# Patient Record
Sex: MALE | Race: WHITE | NOT HISPANIC OR LATINO | Employment: FULL TIME | URBAN - NONMETROPOLITAN AREA
[De-identification: names, ages, dates, MRNs, and addresses within clinical notes are randomized per-mention and may not be internally consistent; named-entity substitution may affect disease eponyms.]

---

## 2017-11-01 ENCOUNTER — COMMUNICATION - GICH (OUTPATIENT)
Dept: FAMILY MEDICINE | Facility: OTHER | Age: 57
End: 2017-11-01

## 2017-11-01 DIAGNOSIS — F34.1 DYSTHYMIC DISORDER: ICD-10-CM

## 2017-12-28 NOTE — TELEPHONE ENCOUNTER
Patient Information     Patient Name MRN Sex Adin Iniguez [id number] Male 1960      Telephone Encounter by Konrad Berkowitz RN at 11/3/2017  1:34 PM     Author:  Konrad Berkowitz RN Service:  (none) Author Type:  NURS- Registered Nurse     Filed:  11/3/2017  1:41 PM Encounter Date:  2017 Status:  Signed     :  Konard Berkowitz RN (NURS- Registered Nurse)            Depression-in adults 18 and over  SSRI    Office visit in the past 12 months or as indicated in chart.  Should have clinic visit 1-2 months after initial prescription.    Last visit with CANDY ZAVALA was on: 2016 in [facility]  Next visit with CANDY ZAVALA is on: No future appointment listed with this provider  Next visit with Family Practice is on: No future appointment listed in this department    Max refills 12 months from last office visit or per providers notes.    PHQ Depression Screening 2016   Date of PHQ exam (doc flow) 2016 (No Data)   1. Lack of interest/pleasure 1 - Several days -   2. Feeling down/depressed 0 - Not at all -   PHQ-2 TOTAL SCORE 1 -   3. Trouble sleeping 1 - Several days -   4. Decreased energy 1 - Several days -   5. Appetite change 0 - Not at all -   6. Feelings of failure 0 - Not at all -   7. Trouble concentrating 0 - Not at all -   8. Activity level 0 - Not at all -   9. Hurting yourself 0 - Not at all -   PHQ-9 TOTAL SCORE 3 -   PHQ-9 Severity Level none -   Functional Impairment not difficult at all -   Some recent data might be hidden       Chart review shows that patient was last seen by PCP for medication management, as well as use of paxil on 16. Dose of paxil was adjusted at that time per office visit notes on that date to 80 mg daily. No follow up noted. Patient is overdue for a follow up with PCP. Will refill rx as requested for a limited supply at this time, and will send patient a reminder letter/MyChart message as no appointment is noted in patient's  chart at this time.    Prescription refilled per RN Medication Refill Policy.................... Konrad Berkowitz RN ....................  11/3/2017   1:38 PM

## 2018-01-30 ENCOUNTER — AMBULATORY - GICH (OUTPATIENT)
Dept: FAMILY MEDICINE | Facility: OTHER | Age: 58
End: 2018-01-30

## 2018-01-30 ENCOUNTER — COMMUNICATION - GICH (OUTPATIENT)
Dept: FAMILY MEDICINE | Facility: OTHER | Age: 58
End: 2018-01-30

## 2018-01-31 ENCOUNTER — AMBULATORY - GICH (OUTPATIENT)
Dept: FAMILY MEDICINE | Facility: OTHER | Age: 58
End: 2018-01-31

## 2018-02-01 ENCOUNTER — OFFICE VISIT - GICH (OUTPATIENT)
Dept: FAMILY MEDICINE | Facility: OTHER | Age: 58
End: 2018-02-01

## 2018-02-01 ENCOUNTER — HISTORY (OUTPATIENT)
Dept: FAMILY MEDICINE | Facility: OTHER | Age: 58
End: 2018-02-01

## 2018-02-01 DIAGNOSIS — R68.89 OTHER GENERAL SYMPTOMS AND SIGNS: ICD-10-CM

## 2018-02-01 DIAGNOSIS — Z13.0 ENCOUNTER FOR SCREENING FOR DISEASES OF THE BLOOD AND BLOOD-FORMING ORGANS AND CERTAIN DISORDERS INVOLVING THE IMMUNE MECHANISM: ICD-10-CM

## 2018-02-01 DIAGNOSIS — Z13.220 ENCOUNTER FOR SCREENING FOR LIPOID DISORDERS: ICD-10-CM

## 2018-02-01 DIAGNOSIS — F41.9 ANXIETY DISORDER: ICD-10-CM

## 2018-02-01 DIAGNOSIS — Z12.5 ENCOUNTER FOR SCREENING FOR MALIGNANT NEOPLASM OF PROSTATE: ICD-10-CM

## 2018-02-01 DIAGNOSIS — M54.2 CERVICALGIA: ICD-10-CM

## 2018-02-01 DIAGNOSIS — Z13.228 ENCOUNTER FOR SCREENING FOR OTHER METABOLIC DISORDERS: ICD-10-CM

## 2018-02-01 DIAGNOSIS — M54.50 LOW BACK PAIN: ICD-10-CM

## 2018-02-01 DIAGNOSIS — G89.29 OTHER CHRONIC PAIN: ICD-10-CM

## 2018-02-01 DIAGNOSIS — Z51.81 ENCOUNTER FOR THERAPEUTIC DRUG LEVEL MONITORING: ICD-10-CM

## 2018-02-01 DIAGNOSIS — Z79.1 LONG TERM CURRENT USE OF NON-STEROIDAL ANTI-INFLAMMATORIES (NSAID): ICD-10-CM

## 2018-02-01 DIAGNOSIS — Z00.00 ENCOUNTER FOR GENERAL ADULT MEDICAL EXAMINATION WITHOUT ABNORMAL FINDINGS: ICD-10-CM

## 2018-02-01 LAB
A/G RATIO - HISTORICAL: 2 (ref 1–2)
ABSOLUTE BASOPHILS - HISTORICAL: 0.1 THOU/CU MM
ABSOLUTE EOSINOPHILS - HISTORICAL: 0.2 THOU/CU MM
ABSOLUTE IMMATURE GRANULOCYTES(METAS,MYELOS,PROS) - HISTORICAL: 0 THOU/CU MM
ABSOLUTE LYMPHOCYTES - HISTORICAL: 2.3 THOU/CU MM (ref 0.9–2.9)
ABSOLUTE MONOCYTES - HISTORICAL: 0.8 THOU/CU MM
ABSOLUTE NEUTROPHILS - HISTORICAL: 3.9 THOU/CU MM (ref 1.7–7)
ALBUMIN SERPL-MCNC: 4.6 G/DL (ref 3.5–5.7)
ALP SERPL-CCNC: 64 IU/L (ref 34–104)
ALT (SGPT) - HISTORICAL: 25 IU/L (ref 7–52)
ANION GAP - HISTORICAL: 7 (ref 5–18)
AST SERPL-CCNC: 23 IU/L (ref 13–39)
BASOPHILS # BLD AUTO: 1.1 %
BILIRUB SERPL-MCNC: 0.3 MG/DL (ref 0.3–1)
BUN SERPL-MCNC: 17 MG/DL (ref 7–25)
BUN/CREAT RATIO - HISTORICAL: 20
CALCIUM SERPL-MCNC: 9.5 MG/DL (ref 8.6–10.3)
CHLORIDE SERPLBLD-SCNC: 105 MMOL/L (ref 98–107)
CHOL/HDL RATIO - HISTORICAL: 3.81
CHOLESTEROL TOTAL: 164 MG/DL
CO2 SERPL-SCNC: 27 MMOL/L (ref 21–31)
CREAT SERPL-MCNC: 0.83 MG/DL (ref 0.7–1.3)
EOSINOPHIL NFR BLD AUTO: 2.9 %
ERYTHROCYTE [DISTWIDTH] IN BLOOD BY AUTOMATED COUNT: 12.7 % (ref 11.5–15.5)
GFR IF NOT AFRICAN AMERICAN - HISTORICAL: >60 ML/MIN/1.73M2
GLOBULIN - HISTORICAL: 2.3 G/DL (ref 2–3.7)
GLUCOSE SERPL-MCNC: 84 MG/DL (ref 70–105)
HCT VFR BLD AUTO: 45 % (ref 37–53)
HDLC SERPL-MCNC: 43 MG/DL (ref 23–92)
HEMOGLOBIN: 16 G/DL (ref 13.5–17.5)
IMMATURE GRANULOCYTES(METAS,MYELOS,PROS) - HISTORICAL: 0.5 %
LDLC SERPL CALC-MCNC: 84 MG/DL
LYMPHOCYTES NFR BLD AUTO: 31.6 % (ref 20–44)
MCH RBC QN AUTO: 30.1 PG (ref 26–34)
MCHC RBC AUTO-ENTMCNC: 35.6 G/DL (ref 32–36)
MCV RBC AUTO: 85 FL (ref 80–100)
MONOCYTES NFR BLD AUTO: 11.1 %
NEUTROPHILS NFR BLD AUTO: 52.8 % (ref 42–72)
NON-HDL CHOLESTEROL - HISTORICAL: 121 MG/DL
PLATELET # BLD AUTO: 300 THOU/CU MM (ref 140–440)
PMV BLD: 9.5 FL (ref 6.5–11)
POTASSIUM SERPL-SCNC: 4 MMOL/L (ref 3.5–5.1)
PROT SERPL-MCNC: 6.9 G/DL (ref 6.4–8.9)
PROVIDER ORDERDED STATUS - HISTORICAL: ABNORMAL
PSA TOTAL (DIAGNOSTIC) - HISTORICAL: 0.61 NG/ML
RED BLOOD COUNT - HISTORICAL: 5.31 MIL/CU MM (ref 4.3–5.9)
SODIUM SERPL-SCNC: 139 MMOL/L (ref 133–143)
T4 FREE SERPL-MCNC: 0.81 NG/DL (ref 0.58–1.64)
TRIGL SERPL-MCNC: 186 MG/DL
TSH - HISTORICAL: 2.53 UIU/ML (ref 0.34–5.6)
WHITE BLOOD COUNT - HISTORICAL: 7.3 THOU/CU MM (ref 4.5–11)

## 2018-02-01 ASSESSMENT — ANXIETY QUESTIONNAIRES
3. WORRYING TOO MUCH ABOUT DIFFERENT THINGS: MORE THAN HALF THE DAYS
7. FEELING AFRAID AS IF SOMETHING AWFUL MIGHT HAPPEN: NEARLY EVERY DAY
5. BEING SO RESTLESS THAT IT IS HARD TO SIT STILL: NEARLY EVERY DAY
2. NOT BEING ABLE TO STOP OR CONTROL WORRYING: SEVERAL DAYS
GAD7 TOTAL SCORE: 17
6. BECOMING EASILY ANNOYED OR IRRITABLE: MORE THAN HALF THE DAYS
4. TROUBLE RELAXING: NEARLY EVERY DAY
1. FEELING NERVOUS, ANXIOUS, OR ON EDGE: NEARLY EVERY DAY

## 2018-02-01 ASSESSMENT — PATIENT HEALTH QUESTIONNAIRE - PHQ9: SUM OF ALL RESPONSES TO PHQ QUESTIONS 1-9: 6

## 2018-02-09 VITALS
DIASTOLIC BLOOD PRESSURE: 88 MMHG | BODY MASS INDEX: 28.7 KG/M2 | HEIGHT: 71 IN | WEIGHT: 205 LBS | SYSTOLIC BLOOD PRESSURE: 136 MMHG | HEART RATE: 60 BPM

## 2018-02-10 ASSESSMENT — PATIENT HEALTH QUESTIONNAIRE - PHQ9: SUM OF ALL RESPONSES TO PHQ QUESTIONS 1-9: 6

## 2018-02-10 ASSESSMENT — ANXIETY QUESTIONNAIRES: GAD7 TOTAL SCORE: 17

## 2018-02-13 NOTE — PROGRESS NOTES
Patient Information     Patient Name MRN Sex Adin Iniguez [id number] Male 1960      Progress Notes by Juan Rojas MD at 2018 10:30 AM     Author:  Juan Rojas MD Service:  (none) Author Type:  Physician     Filed:  2018  6:35 PM Encounter Date:  2018 Status:  Signed     :  Juan Rojas MD (Physician)            Nursing Notes:   Chaya Pimentel  2018 10:53 AM  Signed  He is here today to have a physical.  Chaya MANNING Loren LPN..................2018   10:51 AM        SUBJECTIVE:  Adin Madrid  is a 57 y.o. male who comes in today for complete evaluation. I last saw him about a year and a half ago. He had a negative CT cardiac calcium score and normal lipids.  He is up-to-date on health maintenance issues.    He has issues of ongoing upper back and neck pain. He gets some relief from heat, NSAIDs, and chiropractic treatments with Dr. Ochoa.    He struggles at times with tolerating heat. He uses a warm pack for his neck daily.     He's had plantar fasciitis that has gotten better with insoles. He had some concerns about family history of heart disease.    He has questions about shingles. He would be a candidate for Zostavax at age 62.    He's been on Paxil for many years for depression and anxiety. His depression symptoms are pretty well abated but he has chronic anxiety and the paroxetine seems to help that. He is on 40 mg daily.    He is building a barn this next summer.     PHQ Depression Screening 2016   Date of PHQ exam (doc flow) (No Data) 2018   1. Lack of interest/pleasure - 1 - Several days   2. Feeling down/depressed - 0 - Not at all   PHQ-2 TOTAL SCORE - 1   3. Trouble sleeping - 2 - More than half the days   4. Decreased energy - 2 - More than half the days   5. Appetite change - 0 - Not at all   6. Feelings of failure - 1 - Several days   7. Trouble concentrating - 0 - Not at all   8. Activity level - 0 - Not at all   9. Hurting yourself - 0  - Not at all   PHQ-9 TOTAL SCORE - 6   PHQ-9 Severity Level - mild   Functional Impairment - not difficult at all   Some recent data might be hidden        MODESTA-7 ANXIETY SCREENING 2/1/2018   MODESTA date (doc flow) 2/1/2018   Nervous, anxious 3   Cannot stop worrying 1   Worry about different things 2   Cannot relax 3   Feeling restless 3   Easily annoyed/irritated 2   Afraid of awful event 3   Score 17   Severity severe anxiety   Some recent data might be hidden         Past Medical, Family, and Social History reviewed and updated as noted below.   ROS is negative except as noted above       No Known Allergies,   Family History       Problem   Relation Age of Onset     Diabetes  Mother      Other  Mother      early dementia       Hyperlipidemia  Father      High cholesterol        Stroke  Father    ,   Current Outpatient Prescriptions on File Prior to Visit       Medication  Sig Dispense Refill     desoximetasone (TOPICORT) 0.05 % cream Apply  topically to affected area(s) 2 times daily. 60 g 3     naproxen (ANAPROX DS) 550 mg tablet Take 1 tablet by mouth every 8 hours if needed. 270 tablet 3     No current facility-administered medications on file prior to visit.    ,   Past Medical History:     Diagnosis  Date     Gastroesophageal reflux disease     resolved      General medical examination 9/20/04    Satisfactory      Metatarsalgia     resolved.      Strain of thoracic region    ,   Patient Active Problem List       Diagnosis  Date Noted     Anxiety  02/01/2018     Chronic neck pain  05/18/2015     Chronic low back pain  05/18/2015     DERMATOFIBROMA       Right shoulder        ,   Past Surgical History:      Procedure  Laterality Date     COLONOSCOPY SCREENING  1/2014    Melanosis coli - normal; follow up 10 years       VASECTOMY  01/06    and   Social History     Substance Use Topics       Smoking status: Never Smoker     Smokeless tobacco: Never Used     Alcohol use No     OBJECTIVE:  /88 (Cuff Site: Right  "Arm, Position: Sitting, Cuff Size: Adult Large)  Pulse 60  Ht 1.791 m (5' 10.5\")  Wt 93 kg (205 lb)  BMI 29 kg/m2   EXAM:  General Appearance: Pleasant, alert, appropriate appearance for age. No acute distress  Head Exam: Normal. Normocephalic, atraumatic.  Eye Exam:  Normal external eye, conjunctiva, lids, cornea. GERALDINE. EOMI  Ear Exam: Normal TM's bilaterally. Normal auditory canals and external ears. Non-tender.  Nose Exam: Normal external nose, mucus membranes, and septum.  OroPharynx Exam:  Dental hygiene adequate. Normal buccal mucosa. Normal pharynx.  Neck Exam:  Supple, no masses or nodes. No audible bruits  Thyroid Exam: No nodules or enlargement.  Chest/Respiratory Exam: Normal chest wall and respirations. Clear to auscultation.  Cardiovascular Exam: Regular rate and rhythm. S1, S2, no murmur, click, gallop, or rubs.  Gastrointestinal Exam: Soft, non-tender, no masses or organomegaly.  Lymphatic Exam: Non-palpable nodes in neck, clavicular regions.  Musculoskeletal Exam: Back is straight and non-tender, full ROM of upper and lower extremities.  Foot Exam: Left and right foot: good pedal pulses.  Skin: no rash or abnormalities  Neurologic Exam: Nonfocal,normal gross motor, tone coordination and no tremor.  Psychiatric Exam: Alert and oriented - appropriate affect.     Results for orders placed or performed in visit on 02/01/18      COMP METABOLIC PANEL      Result  Value Ref Range    SODIUM 139 133 - 143 mmol/L    POTASSIUM 4.0 3.5 - 5.1 mmol/L    CHLORIDE 105 98 - 107 mmol/L    CO2,TOTAL 27 21 - 31 mmol/L    ANION GAP 7 5 - 18                    GLUCOSE 84 70 - 105 mg/dL    CALCIUM 9.5 8.6 - 10.3 mg/dL    BUN 17 7 - 25 mg/dL    CREATININE 0.83 0.70 - 1.30 mg/dL    BUN/CREAT RATIO           20                    GFR if African American >60 >60 ml/min/1.73m2    GFR if not African American >60 >60 ml/min/1.73m2    ALBUMIN 4.6 3.5 - 5.7 g/dL    PROTEIN,TOTAL 6.9 6.4 - 8.9 g/dL    GLOBULIN                  " 2.3 2.0 - 3.7 g/dL    A/G RATIO 2.0 1.0 - 2.0                    BILIRUBIN,TOTAL 0.3 0.3 - 1.0 mg/dL    ALK PHOSPHATASE 64 34 - 104 IU/L    ALT (SGPT) 25 7 - 52 IU/L    AST (SGOT) 23 13 - 39 IU/L   PSA TOTAL (DIAGNOSTIC)      Result  Value Ref Range    PSA TOTAL (DIAGNOSTIC) 0.611 <=3.100 ng/mL   LIPID PANEL      Result  Value Ref Range    CHOLESTEROL,TOTAL 164 <200 mg/dL    TRIGLYCERIDES 186 (H) <150 mg/dL    HDL CHOLESTEROL 43 23 - 92 mg/dL    NON-HDL CHOLESTEROL 121 <145 mg/dl    CHOL/HDL RATIO            3.81 <4.50                    LDL CHOLESTEROL 84 <100 mg/dL    PROVIDER ORDERED STATUS RANDOM    TSH      Result  Value Ref Range    TSH 2.53 0.34 - 5.60 uIU/mL   T4,FREE      Result  Value Ref Range    T4,FREE 0.81 0.58 - 1.64 ng/dL   CBC WITH AUTO DIFFERENTIAL      Result  Value Ref Range    WHITE BLOOD COUNT         7.3 4.5 - 11.0 thou/cu mm    RED BLOOD COUNT           5.31 4.30 - 5.90 mil/cu mm    HEMOGLOBIN                16.0 13.5 - 17.5 g/dL    HEMATOCRIT                45.0 37.0 - 53.0 %    MCV                       85 80 - 100 fL    MCH                       30.1 26.0 - 34.0 pg    MCHC                      35.6 32.0 - 36.0 g/dL    RDW                       12.7 11.5 - 15.5 %    PLATELET COUNT            300 140 - 440 thou/cu mm    MPV                       9.5 6.5 - 11.0 fL    NEUTROPHILS               52.8 42.0 - 72.0 %    LYMPHOCYTES               31.6 20.0 - 44.0 %    MONOCYTES                 11.1 <12.0 %    EOSINOPHILS               2.9 <8.0 %    BASOPHILS                 1.1 <3.0 %    IMMATURE GRANULOCYTES(METAS,MYELOS,PROS) 0.5 %    ABSOLUTE NEUTROPHILS      3.9 1.7 - 7.0 thou/cu mm    ABSOLUTE LYMPHOCYTES      2.3 0.9 - 2.9 thou/cu mm    ABSOLUTE MONOCYTES        0.8 <0.9 thou/cu mm    ABSOLUTE EOSINOPHILS      0.2 <0.5 thou/cu mm    ABSOLUTE BASOPHILS        0.1 <0.3 thou/cu mm    ABSOLUTE IMMATURE GRANULOCYTES(METAS,MYELOS,PROS) 0.0 <=0.3 thou/cu mm      ASSESSMENT/Plan :      Adin was seen  today for physical.    Diagnoses and all orders for this visit:    Visit for preventive health examination    Chronic neck pain  -     CBC AND DIFFERENTIAL; Future  -     COMP METABOLIC PANEL; Future    Chronic low back pain, unspecified back pain laterality, with sciatica presence unspecified  -     CBC AND DIFFERENTIAL; Future  -     COMP METABOLIC PANEL; Future    Screening for deficiency anemia  -     CBC AND DIFFERENTIAL; Future    Screening for metabolic disorder  -     COMP METABOLIC PANEL; Future    Screening for hyperlipidemia  -     LIPID PANEL; Future    Screening for prostate cancer  -     PSA TOTAL (DIAGNOSTIC); Future    Heat intolerance  -     TSH; Future  -     T4,FREE; Future    Encounter for monitoring chronic NSAID therapy  -     CBC AND DIFFERENTIAL; Future  -     COMP METABOLIC PANEL; Future    Chronic low back pain without sciatica, unspecified back pain laterality    Anxiety  -     PARoxetine (PAXIL) 40 mg tablet; Take 2 tablets by mouth once daily.    Will notify of lab results when available. Discussed diet, exercise and healthy lifestyle changes. Continue current medications. Continue current treatments for his neck and back pain. Placed on etodolac because Anaprox is no longer covered on his insurance.    Juan Rojas MD

## 2018-02-13 NOTE — NURSING NOTE
Patient Information     Patient Name MRN Sex Adin Iniguez [id number] Male 1960      Nursing Note by Chaya Pimentel at 2018 10:30 AM     Author:  Chaya Pimentel Service:  (none) Author Type:  (none)     Filed:  2018 10:53 AM Encounter Date:  2018 Status:  Signed     :  Chaya Pimentel            He is here today to have a physical.  Chaya Pimentel LPN..................2018   10:51 AM

## 2018-02-13 NOTE — TELEPHONE ENCOUNTER
Patient Information     Patient Name MRN Adin Fajardo [id number] Male 1960      Telephone Encounter by Chaya Pimentel at 2018 11:50 AM     Author:  Chaya Pimentel Service:  (none) Author Type:  (none)     Filed:  2018 11:51 AM Encounter Date:  2018 Status:  Signed     :  Chaya Pimentel            The patient is sending a medical message for Dr Rojas to read today before his appointment on friday. I told the patient he would review this before his appointment if he sends it today.  Chaya Pimentel LPN..................2018   11:51 AM

## 2018-04-18 ENCOUNTER — HOSPITAL ENCOUNTER (OUTPATIENT)
Dept: GENERAL RADIOLOGY | Facility: OTHER | Age: 58
Discharge: HOME | End: 2018-04-18
Attending: FAMILY MEDICINE | Admitting: FAMILY MEDICINE
Payer: COMMERCIAL

## 2018-04-18 ENCOUNTER — OFFICE VISIT (OUTPATIENT)
Dept: FAMILY MEDICINE | Facility: OTHER | Age: 58
End: 2018-04-18
Attending: FAMILY MEDICINE
Payer: COMMERCIAL

## 2018-04-18 ENCOUNTER — OFFICE VISIT (OUTPATIENT)
Dept: ORTHOPEDICS | Facility: OTHER | Age: 58
End: 2018-04-18
Attending: FAMILY MEDICINE
Payer: COMMERCIAL

## 2018-04-18 VITALS
HEART RATE: 64 BPM | BODY MASS INDEX: 29.31 KG/M2 | WEIGHT: 207.2 LBS | SYSTOLIC BLOOD PRESSURE: 124 MMHG | DIASTOLIC BLOOD PRESSURE: 80 MMHG

## 2018-04-18 VITALS
HEIGHT: 71 IN | HEART RATE: 64 BPM | SYSTOLIC BLOOD PRESSURE: 124 MMHG | DIASTOLIC BLOOD PRESSURE: 80 MMHG | WEIGHT: 207 LBS | BODY MASS INDEX: 28.98 KG/M2

## 2018-04-18 DIAGNOSIS — S69.90XA JAMMED FINGER (INTERPHALANGEAL JOINT), INITIAL ENCOUNTER: Primary | ICD-10-CM

## 2018-04-18 DIAGNOSIS — S69.90XA JAMMED FINGER (INTERPHALANGEAL JOINT), INITIAL ENCOUNTER: ICD-10-CM

## 2018-04-18 PROCEDURE — 99213 OFFICE O/P EST LOW 20 MIN: CPT | Performed by: FAMILY MEDICINE

## 2018-04-18 PROCEDURE — 99203 OFFICE O/P NEW LOW 30 MIN: CPT | Performed by: ORTHOPAEDIC SURGERY

## 2018-04-18 PROCEDURE — 73140 X-RAY EXAM OF FINGER(S): CPT | Mod: LT

## 2018-04-18 RX ORDER — ETODOLAC 500 MG
500 TABLET ORAL
COMMUNITY
Start: 2018-02-01 | End: 2019-04-11

## 2018-04-18 RX ORDER — PAROXETINE 40 MG/1
80 TABLET, FILM COATED ORAL DAILY
COMMUNITY
Start: 2018-02-01 | End: 2019-04-11

## 2018-04-18 RX ORDER — DESOXIMETASONE 0.5 MG/G
CREAM TOPICAL
COMMUNITY
Start: 2016-07-19 | End: 2019-07-16

## 2018-04-18 ASSESSMENT — PAIN SCALES - GENERAL
PAINLEVEL: SEVERE PAIN (6)
PAINLEVEL: SEVERE PAIN (6)

## 2018-04-18 NOTE — NURSING NOTE
Patient here for left pinky that he jammed 3 weeks ago. Georgie Palomino LPN .......................4/18/2018  11:03 AM

## 2018-04-18 NOTE — MR AVS SNAPSHOT
"              After Visit Summary   4/18/2018    Adin Madrid    MRN: [id number]           Patient Information     Date Of Birth          1960        Visit Information        Provider Department      4/18/2018 1:45 PM Rj Marie DO [facility]        Today's Diagnoses     Jammed finger (interphalangeal joint), initial encounter           Follow-ups after your visit        Follow-up notes from your care team     Return if symptoms worsen or fail to improve.      Future tests that were ordered for you today     Open Future Orders        Priority Expected Expires Ordered    XR Finger Left G/E 2 Views Routine 4/18/2018 4/18/2019 4/18/2018            Who to contact     If you have questions or need follow up information about today's clinic visit or your schedule please contact [facility] AND [facility] directly at [phone].  Normal or non-critical lab and imaging results will be communicated to you by [employer], letter or phone within 4 business days after the clinic has received the results. If you do not hear from us within 7 days, please contact the clinic through [employer] or phone. If you have a critical or abnormal lab result, we will notify you by phone as soon as possible.  Submit refill requests through [employer] or call your pharmacy and they will forward the refill request to us. Please allow 3 business days for your refill to be completed.          Additional Information About Your Visit        MyChart Information     [employer] lets you send messages to your doctor, view your test results, renew your prescriptions, schedule appointments and more. To sign up, go to [email or web address] . Click on \"Log in\" on the left side of the screen, which will take you to the Welcome page. Then click on \"Sign up Now\" on the right side of the page.     You will be asked to enter the access code listed below, as well as some personal information. Please follow the directions to create " "your username and password.     Your access code is: [email or web address]  Expires: 2018  1:13 PM     Your access code will  in 90 days. If you need help or a new code, please call your [place] clinic or [phone].        Care EveryWhere ID     This is your Care EveryWhere ID. This could be used by other organizations to access your [place] medical records  [id number]        Your Vitals Were     Pulse Height BMI (Body Mass Index)             64 1.791 m (5' 10.5\") 29.28 kg/m2          Blood Pressure from Last 3 Encounters:   18 124/80   18 124/80   18 136/88    Weight from Last 3 Encounters:   18 93.9 kg (207 lb)   18 94 kg (207 lb 3.2 oz)   18 93 kg (205 lb)              Today, you had the following     No orders found for display       Primary Care Provider Office Phone # Fax #    Juan CHO MD Bob [phone]       [address] [facility] [address]        Equal Access to Services     [facility] AH: Hadii aad ku hadasho Soloreali, waaxda luqadaha, qaybta kaalmada adeegyada, delia pryor. So [facility] [phone].    ATENCIÓN: Si habla español, tiene a yadav disposición servicios gratuitos de asistencia lingüística. Llame al [phone].    We comply with applicable federal civil rights laws and [place] laws. We do not discriminate on the basis of race, color, national origin, age, disability, sex, sexual orientation, or gender identity.            Thank you!     Thank you for choosing [facility] [place] AND [facility]  for your care. Our goal is always to provide you with excellent care. Hearing back from our patients is one way we can continue to improve our services. Please take a few minutes to complete the written survey that you may receive in the mail after your visit with us. Thank you!             Your Updated Medication List - Protect others around you: Learn how to safely use, store and throw away your medicines " at www.disposemymeds.org.          This list is accurate as of 4/18/18  3:04 PM.  Always use your most recent med list.                   Brand Name Dispense Instructions for use Diagnosis    Desoximetasone 0.05 % Crea           etodolac 500 MG tablet    LODINE     Take 500 mg by mouth 2 times daily (before meals)        PARoxetine 40 MG tablet    PAXIL     Take 80 mg by mouth daily

## 2018-04-18 NOTE — NURSING NOTE
Patient is here for a consult on his left pinky finger.  DOI: 4/4/18  Rita Madrid LPN .......4/18/2018 1:42 PM

## 2018-04-18 NOTE — PROGRESS NOTES
CHIEF COMPLAINT:   Chief Complaint   Patient presents with     Consult     left pinky finger - doi - 4/4/18       HPI: This 58 year old male relates he injured his left little finger about 2-3 weeks ago when he slipped on ice.  The PIP joint may have been angulated towards the lateral side so he pulled on it.  He noticed swelling and pain of the PIP joint after the fall and injury.  He was seen recently in the clinic and x-rays were taken and he was put in a foam aluminum splint and referred to orthopedics.  Patient relates mostly swelling along the dorsal aspect of the PIP joint and associated pain.  He feels like he cannot actively fully extend the joint but passively he can.  He is able to flex the finger with mild discomfort.  No other injuries.  No history of prior finger injury.    REVIEW OF SYSTEMS:    The review of systems as documented in the HPI and on the intake questionnaire, completed by the patient 4/18/2018, have been reviewed by myself and the pertinent positives and negatives addressed.  The remainder of the complete review of systems was non-contributory.    (PFS) PAST, FAMILY, and/or SOCIAL HISTORY:    PAST MEDICAL HISTORY:  Past Medical History:   Diagnosis Date     Encounter for general adult medical examination without abnormal findings     9/20/04,Satisfactory     Gastro-esophageal reflux disease without esophagitis     resolved     Metatarsalgia     resolved.     Strain of muscle and tendon of unspecified wall of thorax, initial encounter     No Comments Provided       PAST SURGICAL HISTORY:  Past Surgical History:   Procedure Laterality Date     COLONOSCOPY      1/2014,Melanosis coli - normal; follow up 10 years     VASECTOMY      01/06       FAMILY HISTORY:  Family History   Problem Relation Age of Onset     DIABETES Mother      Diabetes     Other - See Comments Mother      early dementia     Hyperlipidemia Father      Hyperlipidemia,High cholesterol     Other - See Comments Father       "Stroke       Additional [facility] information documented on the intake form completed by the patient 4/18/2018 was reviewed by myself.    ALLERGIES:   No Known Allergies    CURRENT MEDICATIONS:    Current Outpatient Prescriptions on File Prior to Visit:  Desoximetasone 0.05 % CREA    etodolac (LODINE) 500 MG tablet Take 500 mg by mouth 2 times daily (before meals)   PARoxetine (PAXIL) 40 MG tablet Take 80 mg by mouth daily     No current facility-administered medications on file prior to visit.     PHYSICAL EXAM:   /80  Pulse 64  Ht 1.791 m (5' 10.5\")  Wt 93.9 kg (207 lb)  BMI 29.28 kg/m2 Body mass index is 29.28 kg/(m^2).    General Appearance: Pleasant male in good appearance, mood and affect.  Alert and orientated times three (time, date and location).    Left hand examination:  Left little finger:  Finger demonstrates moderate swelling mostly on the dorsal aspect of the PIP joint with soft tissue swelling and perhaps a small amount of fluid accumulation under the skin.  Resolving bruising.  Good alignment of the finger.  Active extension demonstrates lack of full PIP extension about 10 .  Full passive PIP extension is present.  Active flexion of the finger into the palm and PIP motion about 90 .  No instability of the joint with varus and valgus stress in extension or mid flexion.  No significant pain with stressing the joint.  Capillary refill less than 2 seconds.    Xray/MRI/MRA:  Radiographic images where independently reviewed and discussed with the patient.    X-ray of the left little finger today demonstrates no dislocation.  There is a very tiny area of possible avulsion fracture from the dorsal base of the middle phalanx seen best on the lateral view.  Area measures about a millimeter it greatest.    IMPRESSION:  Left little finger PIP jamming type injury with possible injury of the central slip.  Suspect capsular strain and sprain.  Residual joint swelling and soft tissue swelling of the PIP joint " little finger.  Extensor and flexor tendons appear otherwise intact on exam.     PLAN:  discussion included review of x-rays.  Discussed the possibility of a small avulsion fracture on the dorsal base of the middle phalanx.  Discussed probable hyperextension or jamming injury of the joint and residual swelling and stiffness.  Recommendation is for an extension splint.  Discussed stretching exercises for the PIP joint and DIP joint.  Patient was fitted with a more comfortable foam aluminum splint which keeps the PIP joint in extension and supports the DIP joint as well.  Recommend using the splint over the next 4 weeks.  Discussed removing the splint for stretching exercises of the PIP joint.  Discussed with the patient he may have some measure of joint swelling forever.  Swelling should gradually decrease but can take 6-12 months.  No recommendation for surgery.  Questions were answered and the patient feels comfortable with recommendations.  He will call back as needed.    Rj Marie D.O., F.A.O.A.O.  Orthopedic Surgeon    [facility]  [address] [place] [id number]  Phone [phone]  Fax [phone]    4/18/2018

## 2018-04-18 NOTE — MR AVS SNAPSHOT
"              After Visit Summary   4/18/2018    Adin Madrid    MRN: [id number]           Patient Information     Date Of Birth          1960        Visit Information        Provider Department      4/18/2018 11:00 AM Viral Roach MD [facility]        Today's Diagnoses     Jammed finger (interphalangeal joint), initial encounter    -  1       Follow-ups after your visit        Additional Services     ORTHOPEDICS ADULT REFERRAL                 Your next 10 appointments already scheduled     Apr 18, 2018  1:45 PM CDT   New Visit with Rj Marie DO   [facility] ([facility])    [address]  [place] [address]   [phone]              Future tests that were ordered for you today     Open Future Orders        Priority Expected Expires Ordered    XR Finger Left G/E 2 Views Routine 4/18/2018 4/18/2019 4/18/2018            Who to contact     If you have questions or need follow up information about today's clinic visit or your schedule please contact [facility] directly at [phone].  Normal or non-critical lab and imaging results will be communicated to you by multiBIND biotechart, letter or phone within 4 business days after the clinic has received the results. If you do not hear from us within 7 days, please contact the clinic through Status Overloadt or phone. If you have a critical or abnormal lab result, we will notify you by phone as soon as possible.  Submit refill requests through Flocktory or call your pharmacy and they will forward the refill request to us. Please allow 3 business days for your refill to be completed.          Additional Information About Your Visit        multiBIND biotechart Information     Flocktory lets you send messages to your doctor, view your test results, renew your prescriptions, schedule appointments and more. To sign up, go to [email or web address] . Click on \"Log in\" on the left side of the screen, " "which will take you to the Welcome page. Then click on \"Sign up Now\" on the right side of the page.     You will be asked to enter the access code listed below, as well as some personal information. Please follow the directions to create your username and password.     Your access code is: [email or web address]  Expires: 2018  1:13 PM     Your access code will  in 90 days. If you need help or a new code, please call your [facility] or [phone].        Care EveryWhere ID     This is your Care EveryWhere ID. This could be used by other organizations to access your [facility] medical records  [id number]        Your Vitals Were     Pulse BMI (Body Mass Index)                64 29.31 kg/m2           Blood Pressure from Last 3 Encounters:   18 124/80   18 136/88   16 120/80    Weight from Last 3 Encounters:   18 207 lb 3.2 oz (94 kg)   18 205 lb (93 kg)   16 207 lb (93.9 kg)              We Performed the Following     ORTHOPEDICS ADULT REFERRAL        Primary Care Provider Office Phone # Fax #    Juan CHO MD Bob [phone]       [address] [facility] [address]        Equal Access to Services     FREDDY CAMPOS AH: Hadii aad ku hadasho Soomaali, waaxda luqadaha, qaybta kaalmada adeegyada, delia petersin hayayon alexis carvalho . So [facility] [phone].    ATENCIÓN: Si habla español, tiene a yadav disposición servicios gratuitos de asistencia lingüística. Llame al [phone].    We comply with applicable federal civil rights laws and [place] laws. We do not discriminate on the basis of race, color, national origin, age, disability, sex, sexual orientation, or gender identity.            Thank you!     Thank you for choosing [facility] [place]  for your care. Our goal is always to provide you with excellent care. Hearing back from our patients is one way we can continue to improve our services. Please take a few minutes to complete the " written survey that you may receive in the mail after your visit with us. Thank you!             Your Updated Medication List - Protect others around you: Learn how to safely use, store and throw away your medicines at www.disposemymeds.org.          This list is accurate as of 4/18/18  1:13 PM.  Always use your most recent med list.                   Brand Name Dispense Instructions for use Diagnosis    Desoximetasone 0.05 % Crea           etodolac 500 MG tablet    LODINE     Take 500 mg by mouth 2 times daily (before meals)        PARoxetine 40 MG tablet    PAXIL     Take 80 mg by mouth daily

## 2018-04-18 NOTE — PROGRESS NOTES
SUBJECTIVE:   Adin Madrid is a 58 year old male who presents to clinic today for the following health issues: Finger injury    HPI Comments: Patient arrives here after sustaining a for injured finger injury.  He jammed his finger about 3 weeks ago.  Since then it has been swollen.  He has difficulty extending it completely.        Patient Active Problem List    Diagnosis Date Noted     Jammed finger (interphalangeal joint), initial encounter 04/18/2018     Priority: Medium     Past Medical History:   Diagnosis Date     Encounter for general adult medical examination without abnormal findings     9/20/04,Satisfactory     Gastro-esophageal reflux disease without esophagitis     resolved     Metatarsalgia     resolved.     Strain of muscle and tendon of unspecified wall of thorax, initial encounter     No Comments Provided      Past Surgical History:   Procedure Laterality Date     COLONOSCOPY      1/2014,Melanosis coli - normal; follow up 10 years     VASECTOMY      01/06     No Known Allergies    Review of Systems     OBJECTIVE:     /80  Pulse 64  Wt 207 lb 3.2 oz (94 kg)  BMI 29.31 kg/m2  Body mass index is 29.31 kg/(m^2).  Physical Exam   Constitutional: He appears well-developed.   Musculoskeletal:   Patient has quite a bit of swelling along the PIP joint of his fifth left digit.  He cannot extend the finger completely.       Diagnostic Test Results:  X-rays show possibly a chip fracture at the proximal interphalangeal joint    ASSESSMENT/PLAN:         1. Jammed finger (interphalangeal joint), initial encounter  With either possible extension ligament involved or even a cyst versus synovitis.  Patient is placed in a splint temporarily.  Orthopedic consult obtained.  He was advised he is currently 3 weeks out and it may be that nothing can be done if he has a ruptured extension tendon.  Is also advised he may be referred to a hand surgeon depending on the consultation.  - XR Finger Left G/E 2 Views;  Future  - ORTHOPEDICS ADULT REFERRAL      Viral Roach MD  [facility]

## 2018-07-23 NOTE — PROGRESS NOTES
Patient Information     Patient Name  Adin Madrid MRN  [id number] Sex  Male   1960      Letter by Juan Rojas MD at      Author:  Juan Rojas MD Service:  (none) Author Type:  (none)    Filed:   Encounter Date:  2017 Status:  (Other)           Adin Madrid  [address] [address] [address]          November 3, 2017    Dear Mr. Madrid:    This is to remind you that you are due for your annual medication management appointment with Juan Rojas MD in relation to continued use of paxil. Your last visit was on 16. Additional refills of your medication require you to complete this visit.    Please call [phone] to schedule your appointment.    Thank you for choosing [facility] for your health care needs.    Sincerely,      Refill RN  [facility]

## 2019-04-11 DIAGNOSIS — F41.9 ANXIETY: ICD-10-CM

## 2019-04-11 DIAGNOSIS — G89.29 CHRONIC NECK PAIN: Primary | ICD-10-CM

## 2019-04-11 DIAGNOSIS — G89.29 CHRONIC LOW BACK PAIN, UNSPECIFIED BACK PAIN LATERALITY, WITH SCIATICA PRESENCE UNSPECIFIED: ICD-10-CM

## 2019-04-11 DIAGNOSIS — M54.5 CHRONIC LOW BACK PAIN, UNSPECIFIED BACK PAIN LATERALITY, WITH SCIATICA PRESENCE UNSPECIFIED: ICD-10-CM

## 2019-04-11 DIAGNOSIS — M54.2 CHRONIC NECK PAIN: Primary | ICD-10-CM

## 2019-04-15 ENCOUNTER — TELEPHONE (OUTPATIENT)
Dept: FAMILY MEDICINE | Facility: OTHER | Age: 59
End: 2019-04-15

## 2019-04-15 RX ORDER — PAROXETINE 40 MG/1
80 TABLET, FILM COATED ORAL DAILY
Qty: 180 TABLET | Refills: 3 | Status: SHIPPED | OUTPATIENT
Start: 2019-04-15 | End: 2019-10-23

## 2019-04-15 RX ORDER — ETODOLAC 500 MG
TABLET ORAL
Qty: 180 TABLET | Refills: 3 | Status: SHIPPED | OUTPATIENT
Start: 2019-04-15 | End: 2019-10-23

## 2019-04-15 NOTE — TELEPHONE ENCOUNTER
RN refill protocol fails for both Rxs as requested. Chart review shows that patient is overdue for an annual exam. Call placed to patient to discuss as no office visit is noted to be scheduled at this time. Patient reports that he has been busy with work but he is willing to come in for an appointment with PCP. He will call back to schedule an office visit at a later date. Writer will abel up and route Rx request to PCP for his consideration/approval. Patient is happy with plan of care.    Unable to complete prescription refill per RN Medication Refill Policy. Konrad Berkowitz 4/15/2019 4:00 PM

## 2019-04-17 NOTE — TELEPHONE ENCOUNTER
The patient stated his Rx's were faxed in and he does not need anything now.  Chaya Pimentel LPN..................4/17/2019   10:10 AM

## 2019-07-15 ENCOUNTER — OFFICE VISIT (OUTPATIENT)
Dept: FAMILY MEDICINE | Facility: OTHER | Age: 59
End: 2019-07-15
Attending: CHIROPRACTOR
Payer: OTHER MISCELLANEOUS

## 2019-07-15 VITALS
BODY MASS INDEX: 28.92 KG/M2 | HEIGHT: 71 IN | SYSTOLIC BLOOD PRESSURE: 122 MMHG | RESPIRATION RATE: 16 BRPM | DIASTOLIC BLOOD PRESSURE: 80 MMHG | TEMPERATURE: 96.9 F | HEART RATE: 64 BPM | WEIGHT: 206.56 LBS

## 2019-07-15 DIAGNOSIS — H18.892 CORNEAL IRRITATION OF LEFT EYE: Primary | ICD-10-CM

## 2019-07-15 DIAGNOSIS — H57.12 EYE PAIN, LEFT: ICD-10-CM

## 2019-07-15 PROCEDURE — 90471 IMMUNIZATION ADMIN: CPT | Performed by: NURSE PRACTITIONER

## 2019-07-15 PROCEDURE — 90715 TDAP VACCINE 7 YRS/> IM: CPT | Performed by: NURSE PRACTITIONER

## 2019-07-15 PROCEDURE — 25000125 ZZHC RX 250: Performed by: NURSE PRACTITIONER

## 2019-07-15 PROCEDURE — 99203 OFFICE O/P NEW LOW 30 MIN: CPT | Mod: 25 | Performed by: NURSE PRACTITIONER

## 2019-07-15 PROCEDURE — 96372 THER/PROPH/DIAG INJ SC/IM: CPT

## 2019-07-15 RX ORDER — TETRACAINE HYDROCHLORIDE 5 MG/ML
2 SOLUTION OPHTHALMIC ONCE
Status: DISCONTINUED | OUTPATIENT
Start: 2019-07-15 | End: 2019-07-15

## 2019-07-15 RX ORDER — OFLOXACIN 3 MG/ML
2 SOLUTION/ DROPS OPHTHALMIC 3 TIMES DAILY
Qty: 1 BOTTLE | Refills: 0 | Status: SHIPPED | OUTPATIENT
Start: 2019-07-15 | End: 2019-12-30

## 2019-07-15 RX ADMIN — TETRACAINE HYDROCHLORIDE 2 DROP: 5 SOLUTION OPHTHALMIC at 17:33

## 2019-07-15 RX ADMIN — FLUORESCEIN SODIUM 1 STRIP: 1 STRIP OPHTHALMIC at 17:31

## 2019-07-15 SDOH — HEALTH STABILITY: MENTAL HEALTH: HOW OFTEN DO YOU HAVE A DRINK CONTAINING ALCOHOL?: NEVER

## 2019-07-15 ASSESSMENT — PATIENT HEALTH QUESTIONNAIRE - PHQ9: SUM OF ALL RESPONSES TO PHQ QUESTIONS 1-9: 0

## 2019-07-15 ASSESSMENT — MIFFLIN-ST. JEOR: SCORE: 1784.09

## 2019-07-15 ASSESSMENT — PAIN SCALES - GENERAL: PAINLEVEL: NO PAIN (0)

## 2019-07-15 NOTE — PATIENT INSTRUCTIONS
Patient Education     Use the drops as ordered.   Apply cold compress and take ibuprofen for pain if needed.   Avoid rubbing the eye.  F/u with Dr. Melendez as scheduled.       Patient Education     Corneal Injury    An eye injury can hurt your cornea. Your cornea is the clear layer on the front of your eye. It protects your eye from dust and germs, and helps filter out harmful UV (ultraviolet) rays. The cornea also helps to focus light entering your eye. Your cornea is made of strong proteins, but it can be damaged. A slight cut or scratch (abrasion) to the cornea can be very painful. But that is often minor and can heal within 1 or 2 days. A bad abrasion or a hole (puncture) in the cornea can be very serious. These are medical emergencies.  Something in your eye  If you think you have something small in your eye, flush it with water right away. Pull your upper lid out and over your bottom lid. This will help increase the flow of tears across your eye. If these methods don t work, call your healthcare provider. Never try to remove an object from your eye that doesn t flush out easily with water. Doing so may cause more damage.  When to go to the emergency room (ER)  Call 911 or your local emergency number if you have:    Severe eye pain    A puncture injury or bad abrasion    Something in your eye that you can t flush out with water    A very swollen or painful eye after removing an object    A chemical burn    An object embedded in your eye. Cover both eyes with a sterile compress and keep both eyes closed while you wait for help. Don't put any pressure on your eyes.  What to expect in the ER  For minor abrasions   Minor abrasions are usually treated with eye drops or ointment. You may be given antibiotics to prevent infection. Most abrasions heal in 1 or 2 days. To help rule out more serious injuries, you may have tests including:    A standard eye exam to check how well you can see    A Ross test, which uses a  "special dye to look for a hole in the surface of your eye  Depending on the results of these tests, you may be referred to an eye specialist (ophthalmologist).  For serious abrasions or punctures  You will be referred directly to an ophthalmologist for emergency treatment. An eye specialist is needed to reduce further damage and possible vision loss.  Date Last Reviewed: 10/1/2017    4641-7584 [employer] [address] [place] [address], [place] [address]. All rights reserved. This information is not intended as a substitute for professional medical care. Always follow your healthcare professional's instructions.               Eye Protection at Work: First Aid  Immediately report all eye injuries to your supervisor for proper medical attention.  Foreign particles  If you get anything in your eye--dirt, metal, even an eyelash--go to the nearest eyewash station or water source. Flush the eye with water until the object has been rinsed out. Don't rub your eye. This can scratch your eye or embed the object. If the particle does not rinse free, bandage your eye loosely and get medical care.  Chemical splashes  Seconds count! Go right away to the nearest emergency shower or water source. Look directly into the stream of water--hold your eyes open with your fingers if needed--and flush your eyes and face for at least 15 minutes. Get medical care.  Light burns  If you are exposed to welding, laser, or other radiant light when you are not wearing proper protective eyewear, you likely won't feel pain right away. From 4 to 12 hours later, though, your eyes may feel \"gritty,\" sensitive to light, or may get red or swollen. If this occurs, keep your eyes closed to avoid irritation, and get medical care.  Cuts near the eye  Do not rub, press, or wash the cut. This can cause further damage. Loosely bandage both eyes to stop any eye movement, and get medical care.  Embedded objects  Never try to remove objects embedded in your " eye. This can cause further damage. Loosely bandage both eyes and get medical care.  Bumps and blows  If you get a bump or blow to the eye, put a cold compress on for 15 minutes to reduce the pain and swelling. Get medical care to check for damage that may have occurred inside the eye.  Date Last Reviewed: 6/1/2018 2000-2018 [employer] [address] [place] [address], [place] [address]. All rights reserved. This information is not intended as a substitute for professional medical care. Always follow your healthcare professional's instructions.

## 2019-07-15 NOTE — NURSING NOTE
Patient presents to clinic today for possibly a foreign object in left eye. This happened while he was at work.     No LMP for male patient.  Medication Reconciliation: complete    Alena Flor LPN  7/15/2019 3:42 PM

## 2019-07-15 NOTE — PROGRESS NOTES
"  SUBJECTIVE:   Adin Madrid is a 59 year old male who presents to clinic today for the following health issues:    HPI  Presents with left eye pain, feels gritty. No vision changes. Tried flushing without improvement. No drainage.  States he feels like something is moving around in his eye.    Past medical, past surgical and family history all reviewed and updated as needed.  Medications are reviewed and updated as needed.    Review of Systems   Constitutional: Negative.    HENT: Negative.    Eyes: Positive for pain. Negative for photophobia, discharge, redness, itching and visual disturbance.   Musculoskeletal: Negative.    Skin: Negative.    Neurological: Negative.    Hematological: Negative.         OBJECTIVE:     /80 (BP Location: Right arm, Patient Position: Sitting, Cuff Size: Adult Regular)   Pulse 64   Temp 96.9  F (36.1  C) (Tympanic)   Resp 16   Ht 1.803 m (5' 11\")   Wt 93.7 kg (206 lb 9 oz)   BMI 28.81 kg/m    Body mass index is 28.81 kg/m .  Physical Exam   Constitutional: He is oriented to person, place, and time. He appears well-developed and well-nourished. No distress.   HENT:   Head: Normocephalic and atraumatic.   Right Ear: External ear normal.   Left Ear: External ear normal.   Nose: Nose normal.   Eyes: Pupils are equal, round, and reactive to light. Conjunctivae, EOM and lids are normal. Right eye exhibits no discharge. Left eye exhibits no discharge. No scleral icterus.   Slit lamp exam:       The left eye shows no corneal abrasion, no corneal flare, no corneal ulcer and no foreign body.   Tetracaine drops instilled to left eye.  Fluorescein dye applied with saline.  Examined eye with Woods lamp, no evidence of foreign body or corneal abrasion on exam.   Neck: Normal range of motion. Neck supple.   Cardiovascular: Normal rate.   Pulmonary/Chest: Effort normal.   Musculoskeletal: Normal range of motion.   Neurological: He is alert and oriented to person, place, and time.   Skin: " Skin is warm and dry. He is not diaphoretic.   Nursing note and vitals reviewed.      Diagnostic Test Results:  No results found for this or any previous visit (from the past 24 hour(s)).    Nurse to flush patient's eye to remove any debris that may have been missed on exam.      ASSESSMENT/PLAN:       ICD-10-CM    1. Corneal irritation of left eye H18.892 ofloxacin (OCUFLOX) 0.3 % ophthalmic solution   2. Eye pain, left H57.12 tetracaine (PONTOCAINE) 0.5 % ophthalmic solution 2 drop     fluorescein (FUL-FÁTIMA) ophthalmic strip 1 strip     sodium chloride (PF) 0.9% PF flush 3 mL     PLAN:  Tdap updated due to eye injury.  No evidence of corneal abrasion or ulcer on exam.  Will cover corneal irritation with ofloxacin drops as ordered.  Advised to apply cold compresses and take ibuprofen for pain as needed.  Monitor symptoms.  Follow-up as scheduled for reevaluation.  Given epic education materials.  I explained my diagnostic considerations and recommendations to pt who voiced understanding and agreement with the treatment plan. All questions were answered. We discussed potential side effects of any prescribed or recommended therapies, as well as expectations for response to treatments.    Disclaimer:  This note consists of words and symbols derived from keyboarding, dictation, or using voice recognition software. As a result, there may be errors in the script that have gone undetected. Please consider this when interpreting information found in this note.      DANISHA Lauren, NP-C  7/15/2019 at 4:06 PM  [facility]

## 2019-07-15 NOTE — NURSING NOTE
Chief Complaint   Patient presents with     Eye Problem     LEFT   Eye Flush done on patients left eye  Jazmín Kathleen LPN on 7/15/2019 at 5:43 PM      Medication Reconciliation: completed   Jazmín Kathleen LPN  7/15/2019 5:42 PM

## 2019-07-16 ENCOUNTER — OFFICE VISIT (OUTPATIENT)
Dept: FAMILY MEDICINE | Facility: OTHER | Age: 59
End: 2019-07-16
Attending: CHIROPRACTOR
Payer: OTHER MISCELLANEOUS

## 2019-07-16 VITALS
TEMPERATURE: 97.8 F | HEART RATE: 68 BPM | BODY MASS INDEX: 29.35 KG/M2 | HEIGHT: 70 IN | WEIGHT: 205 LBS | DIASTOLIC BLOOD PRESSURE: 86 MMHG | RESPIRATION RATE: 16 BRPM | SYSTOLIC BLOOD PRESSURE: 120 MMHG

## 2019-07-16 DIAGNOSIS — H18.892 CORNEAL IRRITATION OF LEFT EYE: Primary | ICD-10-CM

## 2019-07-16 DIAGNOSIS — H57.12 EYE PAIN, LEFT: ICD-10-CM

## 2019-07-16 PROCEDURE — 99213 OFFICE O/P EST LOW 20 MIN: CPT | Performed by: CHIROPRACTOR

## 2019-07-16 ASSESSMENT — PAIN SCALES - GENERAL: PAINLEVEL: MILD PAIN (2)

## 2019-07-16 ASSESSMENT — MIFFLIN-ST. JEOR: SCORE: 1761.12

## 2019-07-16 NOTE — PROGRESS NOTES
CHIEF COMPLAINT: Adin Madrid is a 59 year old  male  Chief Complaint   Patient presents with     Work Comp     eye problem       HISTORY OF PRESENTING INJURY     This is a work related claim involving his left eye.  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  He denies working with any chemicals.  He attempted to flush it at work but was unsuccessful. [facility]ed [facility].  This note is not yet completed and I am unable to see exactly what was performed/evaluated.  Adin states it still feels as if there is something in his left eye, located more medially.         PAST MEDICAL HISTORY:  Past Medical History:   Diagnosis Date     Encounter for general adult medical examination without abnormal findings     9/20/04,Satisfactory     Gastro-esophageal reflux disease without esophagitis     resolved     Metatarsalgia     resolved.     Strain of muscle and tendon of unspecified wall of thorax, initial encounter     No Comments Provided       PAST SURGICAL HISTORY:  Past Surgical History:   Procedure Laterality Date     COLONOSCOPY  01/08/2014 1/2014,Melanosis coli - normal; follow up 10 years     VASECTOMY      01/06       ALLERGIES:  No Known Allergies    CURRENT MEDICATIONS:  Current Outpatient Medications   Medication Sig Dispense Refill     etodolac (LODINE) 500 MG tablet TAKE 1 TABLET BY MOUTH TWICE DAILY WITH MEALS 180 tablet 3     ofloxacin (OCUFLOX) 0.3 % ophthalmic solution Place 2 drops Into the left eye 3 times daily for 7 days 1 Bottle 0     PARoxetine (PAXIL) 40 MG tablet Take 2 tablets (80 mg) by mouth daily 180 tablet 3       SOCIAL HISTORY:  Social History     Socioeconomic History     Marital status:      Spouse name: Not on file     Number of children: Not on file     Years of education: Not on file     Highest education level: Not on file   Occupational History     Not on file   Social Needs     Financial  resource strain: Not on file     Food insecurity:     Worry: Not on file     Inability: Not on file     Transportation needs:     Medical: Not on file     Non-medical: Not on file   Tobacco Use     Smoking status: Never Smoker     Smokeless tobacco: Never Used   Substance and Sexual Activity     Alcohol use: No     Frequency: Never     Drug use: No     Types: Other     Comment: Drug use: No     Sexual activity: Not Currently   Lifestyle     Physical activity:     Days per week: Not on file     Minutes per session: Not on file     Stress: Not on file   Relationships     Social connections:     Talks on phone: Not on file     Gets together: Not on file     Attends Faith service: Not on file     Active member of club or organization: Not on file     Attends meetings of clubs or organizations: Not on file     Relationship status: Not on file     Intimate partner violence:     Fear of current or ex partner: Not on file     Emotionally abused: Not on file     Physically abused: Not on file     Forced sexual activity: Not on file   Other Topics Concern     Parent/sibling w/ CABG, MI or angioplasty before 65F 55M? Not Asked   Social History Narrative    , works at the [employer] in [place].    in 2010.  Koki Madrid Mother  Lore Ex-spouse  Children None       FAMILY HISTORY:  Family History   Problem Relation Age of Onset     Diabetes Mother         Diabetes     Other - See Comments Mother         early dementia     Hyperlipidemia Father         Hyperlipidemia,High cholesterol     Other - See Comments Father         Stroke       REVIEW OF SYSTEMS:    Nursing Notes:   Georgie Maynard LPN  7/16/2019  9:31 AM  Signed  Adin MONTEMAYOR Julius is a 59 year old male presenting for a work comp follow up from yesterday. He has a corneal abrasion. He reports that he feels that there is something still in his eye. He has been using the drops that he has been  "prescribed.  Review Of Systems  Skin: negative  Eyes: positive for visual blurring, redness, irritation of the left eye  Ears/Nose/Throat: negative  Respiratory: No shortness of breath, dyspnea on exertion, cough, or hemoptysis  Cardiovascular: negative  Gastrointestinal: negative  Genitourinary: negative  Musculoskeletal: negative  Neurologic: negative  Psychiatric: negative  Hematologic/Lymphatic/Immunologic: negative  Endocrine: negative  Georgie Maynard LPN 7/16/2019 9:08 AM      I have reviewed the ROS with the patient    PHYSICAL EXAM:   /86 (BP Location: Right arm)   Pulse 68   Temp 97.8  F (36.6  C) (Tympanic)   Resp 16   Ht 1.778 m (5' 10\")   Wt 93 kg (205 lb)   BMI 29.41 kg/m   Body mass index is 29.41 kg/m .    General Appearance: No acute distress. Opthalmoscope evaluation shows no apparent foreign object.  He has no excessive tears or signs of inflammation.  Eye movement is wnl in all planes.  Normal pupil dilation.  Vision is unaffected.     IMPRESSION/PLAN:    Further evaluation from an Opthamologist is warranted.  Referral made to [facility] in [place], [place].  Further treatment/management to be determined.  Adin was in agreement with this plan going forward.  He continued to deny any work restrictions at this time.     Greater than 50% of this 32 minute encounter was spent in counseling and coordination of care regarding the above condition.        Jake Ochoa DC  Director - Occupational Medicine Department  [facility]  [address] [place] [address]  Phone [phone]  Fax [phone]    Disclaimer:  This note consists of words and symbols derived from keyboarding, dictation, or using voice recognition software. As a result, there may be errors in the script that have gone undetected. Please consider this when interpreting information found in this note.    10:03 AM 7/16/2019    "

## 2019-07-16 NOTE — NURSING NOTE
Adin Madrid is a 59 year old male presenting for a work comp follow up from yesterday. He has a corneal abrasion. He reports that he feels that there is something still in his eye. He has been using the drops that he has been prescribed.  Review Of Systems  Skin: negative  Eyes: positive for visual blurring, redness, irritation of the left eye  Ears/Nose/Throat: negative  Respiratory: No shortness of breath, dyspnea on exertion, cough, or hemoptysis  Cardiovascular: negative  Gastrointestinal: negative  Genitourinary: negative  Musculoskeletal: negative  Neurologic: negative  Psychiatric: negative  Hematologic/Lymphatic/Immunologic: negative  Endocrine: negative  Georgie Maynard LPN 7/16/2019 9:08 AM

## 2019-07-17 ASSESSMENT — ENCOUNTER SYMPTOMS
EYE PAIN: 1
EYE DISCHARGE: 0
CONSTITUTIONAL NEGATIVE: 1
NEUROLOGICAL NEGATIVE: 1
EYE REDNESS: 0
EYE ITCHING: 0
MUSCULOSKELETAL NEGATIVE: 1
PHOTOPHOBIA: 0
HEMATOLOGIC/LYMPHATIC NEGATIVE: 1

## 2019-10-23 ENCOUNTER — OFFICE VISIT (OUTPATIENT)
Dept: FAMILY MEDICINE | Facility: OTHER | Age: 59
End: 2019-10-23
Attending: FAMILY MEDICINE
Payer: COMMERCIAL

## 2019-10-23 VITALS
WEIGHT: 218 LBS | SYSTOLIC BLOOD PRESSURE: 138 MMHG | OXYGEN SATURATION: 96 % | HEART RATE: 66 BPM | BODY MASS INDEX: 31.21 KG/M2 | RESPIRATION RATE: 16 BRPM | TEMPERATURE: 96.9 F | HEIGHT: 70 IN | DIASTOLIC BLOOD PRESSURE: 88 MMHG

## 2019-10-23 DIAGNOSIS — G89.29 CHRONIC MIDLINE LOW BACK PAIN WITHOUT SCIATICA: ICD-10-CM

## 2019-10-23 DIAGNOSIS — G89.29 CHRONIC NECK PAIN: ICD-10-CM

## 2019-10-23 DIAGNOSIS — Z23 NEED FOR IMMUNIZATION AGAINST INFLUENZA: ICD-10-CM

## 2019-10-23 DIAGNOSIS — Z00.00 VISIT FOR PREVENTIVE HEALTH EXAMINATION: Primary | ICD-10-CM

## 2019-10-23 DIAGNOSIS — M54.2 CHRONIC NECK PAIN: ICD-10-CM

## 2019-10-23 DIAGNOSIS — Z13.228 SCREENING FOR METABOLIC DISORDER: ICD-10-CM

## 2019-10-23 DIAGNOSIS — Z12.5 SCREENING FOR PROSTATE CANCER: ICD-10-CM

## 2019-10-23 DIAGNOSIS — Z13.220 SCREENING FOR HYPERLIPIDEMIA: ICD-10-CM

## 2019-10-23 DIAGNOSIS — Z13.0 SCREENING FOR DEFICIENCY ANEMIA: ICD-10-CM

## 2019-10-23 DIAGNOSIS — G89.29 NECK PAIN, CHRONIC: ICD-10-CM

## 2019-10-23 DIAGNOSIS — M54.2 NECK PAIN, CHRONIC: ICD-10-CM

## 2019-10-23 DIAGNOSIS — F41.9 ANXIETY: ICD-10-CM

## 2019-10-23 DIAGNOSIS — G47.33 OSA (OBSTRUCTIVE SLEEP APNEA): ICD-10-CM

## 2019-10-23 DIAGNOSIS — M54.50 CHRONIC MIDLINE LOW BACK PAIN WITHOUT SCIATICA: ICD-10-CM

## 2019-10-23 LAB
ALBUMIN SERPL-MCNC: 4.8 G/DL (ref 3.5–5.7)
ALP SERPL-CCNC: 61 U/L (ref 34–104)
ALT SERPL W P-5'-P-CCNC: 36 U/L (ref 7–52)
ANION GAP SERPL CALCULATED.3IONS-SCNC: 7 MMOL/L (ref 3–14)
AST SERPL W P-5'-P-CCNC: 26 U/L (ref 13–39)
BASOPHILS # BLD AUTO: 0.1 10E9/L (ref 0–0.2)
BASOPHILS NFR BLD AUTO: 1.1 %
BILIRUB SERPL-MCNC: 0.3 MG/DL (ref 0.3–1)
BUN SERPL-MCNC: 15 MG/DL (ref 7–25)
CALCIUM SERPL-MCNC: 9.2 MG/DL (ref 8.6–10.3)
CHLORIDE SERPL-SCNC: 105 MMOL/L (ref 98–107)
CHOLEST SERPL-MCNC: 150 MG/DL
CO2 SERPL-SCNC: 26 MMOL/L (ref 21–31)
CREAT SERPL-MCNC: 0.95 MG/DL (ref 0.7–1.3)
DIFFERENTIAL METHOD BLD: NORMAL
EOSINOPHIL # BLD AUTO: 0.3 10E9/L (ref 0–0.7)
EOSINOPHIL NFR BLD AUTO: 3.5 %
ERYTHROCYTE [DISTWIDTH] IN BLOOD BY AUTOMATED COUNT: 12.9 % (ref 10–15)
GFR SERPL CREATININE-BSD FRML MDRD: 82 ML/MIN/{1.73_M2}
GLUCOSE SERPL-MCNC: 125 MG/DL (ref 70–105)
HCT VFR BLD AUTO: 45.9 % (ref 40–53)
HDLC SERPL-MCNC: 50 MG/DL (ref 23–92)
HGB BLD-MCNC: 15.9 G/DL (ref 13.3–17.7)
IMM GRANULOCYTES # BLD: 0.1 10E9/L (ref 0–0.4)
IMM GRANULOCYTES NFR BLD: 0.7 %
LDLC SERPL CALC-MCNC: 83 MG/DL
LYMPHOCYTES # BLD AUTO: 1.8 10E9/L (ref 0.8–5.3)
LYMPHOCYTES NFR BLD AUTO: 25.7 %
MCH RBC QN AUTO: 29.7 PG (ref 26.5–33)
MCHC RBC AUTO-ENTMCNC: 34.6 G/DL (ref 31.5–36.5)
MCV RBC AUTO: 86 FL (ref 78–100)
MONOCYTES # BLD AUTO: 0.7 10E9/L (ref 0–1.3)
MONOCYTES NFR BLD AUTO: 10.4 %
NEUTROPHILS # BLD AUTO: 4.2 10E9/L (ref 1.6–8.3)
NEUTROPHILS NFR BLD AUTO: 58.6 %
NONHDLC SERPL-MCNC: 100 MG/DL
PLATELET # BLD AUTO: 288 10E9/L (ref 150–450)
POTASSIUM SERPL-SCNC: 4.2 MMOL/L (ref 3.5–5.1)
PROT SERPL-MCNC: 7.3 G/DL (ref 6.4–8.9)
PSA SERPL-MCNC: 0.81 NG/ML
RBC # BLD AUTO: 5.35 10E12/L (ref 4.4–5.9)
SODIUM SERPL-SCNC: 138 MMOL/L (ref 134–144)
TRIGL SERPL-MCNC: 83 MG/DL
WBC # BLD AUTO: 7.1 10E9/L (ref 4–11)

## 2019-10-23 PROCEDURE — 85025 COMPLETE CBC W/AUTO DIFF WBC: CPT | Mod: ZL | Performed by: FAMILY MEDICINE

## 2019-10-23 PROCEDURE — 99396 PREV VISIT EST AGE 40-64: CPT | Performed by: FAMILY MEDICINE

## 2019-10-23 PROCEDURE — 80053 COMPREHEN METABOLIC PANEL: CPT | Mod: ZL | Performed by: FAMILY MEDICINE

## 2019-10-23 PROCEDURE — 36415 COLL VENOUS BLD VENIPUNCTURE: CPT | Mod: ZL | Performed by: FAMILY MEDICINE

## 2019-10-23 PROCEDURE — 84153 ASSAY OF PSA TOTAL: CPT | Mod: ZL | Performed by: FAMILY MEDICINE

## 2019-10-23 PROCEDURE — 90471 IMMUNIZATION ADMIN: CPT | Performed by: FAMILY MEDICINE

## 2019-10-23 PROCEDURE — 80061 LIPID PANEL: CPT | Mod: ZL | Performed by: FAMILY MEDICINE

## 2019-10-23 PROCEDURE — 90686 IIV4 VACC NO PRSV 0.5 ML IM: CPT | Performed by: FAMILY MEDICINE

## 2019-10-23 RX ORDER — PAROXETINE 40 MG/1
80 TABLET, FILM COATED ORAL DAILY
Qty: 180 TABLET | Refills: 3 | Status: SHIPPED | OUTPATIENT
Start: 2019-10-23 | End: 2020-10-26

## 2019-10-23 RX ORDER — ETODOLAC 500 MG
TABLET ORAL
Qty: 180 TABLET | Refills: 3 | Status: SHIPPED | OUTPATIENT
Start: 2019-10-23 | End: 2020-10-26

## 2019-10-23 ASSESSMENT — ANXIETY QUESTIONNAIRES
7. FEELING AFRAID AS IF SOMETHING AWFUL MIGHT HAPPEN: NOT AT ALL
GAD7 TOTAL SCORE: 11
5. BEING SO RESTLESS THAT IT IS HARD TO SIT STILL: MORE THAN HALF THE DAYS
2. NOT BEING ABLE TO STOP OR CONTROL WORRYING: SEVERAL DAYS
IF YOU CHECKED OFF ANY PROBLEMS ON THIS QUESTIONNAIRE, HOW DIFFICULT HAVE THESE PROBLEMS MADE IT FOR YOU TO DO YOUR WORK, TAKE CARE OF THINGS AT HOME, OR GET ALONG WITH OTHER PEOPLE: VERY DIFFICULT
6. BECOMING EASILY ANNOYED OR IRRITABLE: MORE THAN HALF THE DAYS
1. FEELING NERVOUS, ANXIOUS, OR ON EDGE: MORE THAN HALF THE DAYS
3. WORRYING TOO MUCH ABOUT DIFFERENT THINGS: SEVERAL DAYS

## 2019-10-23 ASSESSMENT — PATIENT HEALTH QUESTIONNAIRE - PHQ9
SUM OF ALL RESPONSES TO PHQ QUESTIONS 1-9: 5
5. POOR APPETITE OR OVEREATING: NEARLY EVERY DAY

## 2019-10-23 ASSESSMENT — MIFFLIN-ST. JEOR: SCORE: 1820.09

## 2019-10-23 ASSESSMENT — PAIN SCALES - GENERAL: PAINLEVEL: MODERATE PAIN (5)

## 2019-10-23 NOTE — LETTER
My Depression Action Plan  Name: Adin Madrid   Date of Birth 1960  Date: 10/23/2019    My doctor: Juan Rojas   My clinic: [facility]  [address]  [place] [id number] [phone]          GREEN    ZONE   Good Control    What it looks like:     Things are going generally well. You have normal up s and down s. You may even feel depressed from time to time, but bad moods usually last less than a day.   What you need to do:  1. Continue to care for yourself (see self care plan)  2. Check your depression survival kit and update it as needed  3. Follow your physician s recommendations including any medication.  4. Do not stop taking medication unless you consult with your physician first.           YELLOW         ZONE Getting Worse    What it looks like:     Depression is starting to interfere with your life.     It may be hard to get out of bed; you may be starting to isolate yourself from others.    Symptoms of depression are starting to last most all day and this has happened for several days.     You may have suicidal thoughts but they are not constant.   What you need to do:     1. Call your care team, your response to treatment will improve if you keep your care team informed of your progress. Yellow periods are signs an adjustment may need to be made.     2. Continue your self-care, even if you have to fake it!    3. Talk to someone in your support network    4. Open up your depression survival kit           RED    ZONE Medical Alert - Get Help    What it looks like:     Depression is seriously interfering with your life.     You may experience these or other symptoms: You can t get out of bed most days, can t work or engage in other necessary activities, you have trouble taking care of basic hygiene, or basic responsibilities, thoughts of suicide or death that will not go away, self-injurious behavior.     What you need to do:  1. Call your care team  and request a same-day appointment. If they are not available (weekends or after hours) call your local crisis line, emergency room or 911.            Depression Self Care Plan / Survival Kit    Self-Care for Depression  Here s the deal. Your body and mind are really not as separate as most people think.  What you do and think affects how you feel and how you feel influences what you do and think. This means if you do things that people who feel good do, it will help you feel better.  Sometimes this is all it takes.  There is also a place for medication and therapy depending on how severe your depression is, so be sure to consult with your medical provider and/ or Behavioral Health Consultant if your symptoms are worsening or not improving.     In order to better manage my stress, I will:    Exercise  Get some form of exercise, every day. This will help reduce pain and release endorphins, the  feel good  chemicals in your brain. This is almost as good as taking antidepressants!  This is not the same as joining a gym and then never going! (they count on that by the way ) It can be as simple as just going for a walk or doing some gardening, anything that will get you moving.      Hygiene   Maintain good hygiene (Get out of bed in the morning, Make your bed, Brush your teeth, Take a shower, and Get dressed like you were going to work, even if you are unemployed).  If your clothes don't fit try to get ones that do.    Diet  I will strive to eat foods that are good for me, drink plenty of water, and avoid excessive sugar, caffeine, alcohol, and other mood-altering substances.  Some foods that are helpful in depression are: complex carbohydrates, B vitamins, flaxseed, fish or fish oil, fresh fruits and vegetables.    Psychotherapy  I agree to participate in Individual Therapy (if recommended).    Medication  If prescribed medications, I agree to take them.  Missing doses can result in serious side effects.  I understand  that drinking alcohol, or other illicit drug use, may cause potential side effects.  I will not stop my medication abruptly without first discussing it with my provider.    Staying Connected With Others  I will stay in touch with my friends, family members, and my primary care provider/team.    Use your imagination  Be creative.  We all have a creative side; it doesn t matter if it s oil painting, sand castles, or mud pies! This will also kick up the endorphins.    Witness Beauty  (AKA stop and smell the roses) Take a look outside, even in mid-winter. Notice colors, textures. Watch the squirrels and birds.     Service to others  Be of service to others.  There is always someone else in need.  By helping others we can  get out of ourselves  and remember the really important things.  This also provides opportunities for practicing all the other parts of the program.    Humor  Laugh and be silly!  Adjust your TV habits for less news and crime-drama and more comedy.    Control your stress  Try breathing deep, massage therapy, biofeedback, and meditation. Find time to relax each day.     My support system    Clinic Contact:  Phone number:    Contact 1:  Phone number:    Contact 2:  Phone number:    Holiness/:  Phone number:    Therapist:  Phone number:    Local crisis center:    Phone number:    Other community support:  Phone number:

## 2019-10-23 NOTE — NURSING NOTE
"Chief Complaint   Patient presents with     Physical       Initial /88   Pulse 66   Temp 96.9  F (36.1  C) (Temporal)   Resp 16   Ht 1.778 m (5' 10\")   Wt 98.9 kg (218 lb)   SpO2 96%   BMI 31.28 kg/m   Estimated body mass index is 31.28 kg/m  as calculated from the following:    Height as of this encounter: 1.778 m (5' 10\").    Weight as of this encounter: 98.9 kg (218 lb).  Medication Reconciliation: complete    Chaya Pimentel LPN  "

## 2019-10-23 NOTE — PROGRESS NOTES
"Nursing Notes:   Chaya Pimentel LPN  10/23/2019  8:49 AM  Signed  Chief Complaint   Patient presents with     Physical       Initial /88   Pulse 66   Temp 96.9  F (36.1  C) (Temporal)   Resp 16   Ht 1.778 m (5' 10\")   Wt 98.9 kg (218 lb)   SpO2 96%   BMI 31.28 kg/m    Estimated body mass index is 31.28 kg/m  as calculated from the following:    Height as of this encounter: 1.778 m (5' 10\").    Weight as of this encounter: 98.9 kg (218 lb).  Medication Reconciliation: complete    Chaya Pimentel LPN    SUBJECTIVE:  Adin Madrid  is a 59 year old male who comes in today for complete evaluation.    He has obstructive sleep apnea and uses his machine for CPAP regularly and finds it helpful.  His machine is becoming outdated and needs replaced.  He has not seen a sleep specialist for some time.    He is bothered by low back pain for which he takes etodolac.  He keeps active and does stretches and sees a chiropractor and has actually gotten an ultrasound machine.  He finds some benefit from CBD oil. He does hot and cold packs.     He continues on Paxil.  He uses a half of Benadryl at night to help with sleep.  He has chronic depression and anxiety which is been well controlled on paroxetine.    PHQ-9 SCORE 2/1/2018 7/15/2019 10/23/2019   PHQ-9 Total Score 6 0 5     MODESTA-7 SCORE 6/2/2016 2/1/2018 10/23/2019   Total Score 9 17 11       He has a history of a negative CT cardiac calcium score and normal lipids.  He is up-to-date on health maintenance issues.  He has not had Shingrix.  He is due for his flu shot.    He built a big barn/storage unit over the last 2 years.  He has a 1965 [place]. He is looking forward to deer season.    Past Medical, Family, and Social History reviewed and updated as noted below.   ROS is negative except as noted above       No Known Allergies,   Family History   Problem Relation Age of Onset     Diabetes Mother         Diabetes     Other - See Comments Mother         early " "dementia     Hyperlipidemia Father         Hyperlipidemia,High cholesterol     Other - See Comments Father         Stroke   ,   Current Outpatient Medications   Medication     etodolac (LODINE) 500 MG tablet     PARoxetine (PAXIL) 40 MG tablet     No current facility-administered medications for this visit.    ,   Past Medical History:   Diagnosis Date     Encounter for general adult medical examination without abnormal findings     9/20/04,Satisfactory     Gastro-esophageal reflux disease without esophagitis     resolved     Metatarsalgia     resolved.     Strain of muscle and tendon of unspecified wall of thorax, initial encounter     No Comments Provided   ,   Patient Active Problem List    Diagnosis Date Noted     Jammed finger (interphalangeal joint), initial encounter 04/18/2018     Priority: Medium   ,   Past Surgical History:   Procedure Laterality Date     COLONOSCOPY  01/08/2014 1/2014,Melanosis coli - normal; follow up 10 years     VASECTOMY      01/06    and   Social History     Tobacco Use     Smoking status: Never Smoker     Smokeless tobacco: Never Used   Substance Use Topics     Alcohol use: No     Frequency: Never     OBJECTIVE:  /88   Pulse 66   Temp 96.9  F (36.1  C) (Temporal)   Resp 16   Ht 1.778 m (5' 10\")   Wt 98.9 kg (218 lb)   SpO2 96%   BMI 31.28 kg/m     EXAM:  General Appearance: Pleasant, alert, appropriate appearance for age. No acute distress  Head Exam: Normal. Normocephalic, atraumatic.  Eye Exam:  Normal external eyes, conjunctivae, lids, cornea. GERALDINE. EOMI  Ear Exam: Normal TM's bilaterally. Normal auditory canals and external ears. Non-tender.  Nose Exam: Normal external nose, mucus membranes, and septum.  OroPharynx Exam:  Dental hygiene adequate. Normal buccal mucosa. Normal pharynx.  Neck Exam:  Supple, no masses or nodes. No audible bruits.  Normal rotational range of motion.  Some discomfort on chin tuck.  Decreased lateral bending.  Thyroid Exam: No nodules " or enlargement.  Chest/Respiratory Exam: Normal chest wall and respirations. Clear to auscultation.  Normal rotational range of motion of the thoracic spine.  Cardiovascular Exam: Regular rate and rhythm. S1, S2, no murmur, click, gallop, or rubs.  Gastrointestinal Exam: Soft, non-tender, no masses or organomegaly.  Lymphatic Exam: Non-palpable nodes in neck, clavicular regions.  Musculoskeletal Exam: Back is straight and non-tender, full ROM of upper and lower extremities.  Foot Exam: Left and right foot: good pedal pulses  Skin: no rash or abnormalities  Neurologic Exam: Nonfocal, normal gross motor, tone coordination and no tremor.  Psychiatric Exam: Alert and oriented - appropriate affect.     Results for orders placed or performed in visit on 10/23/19   Prostate Specific Antigen GH   Result Value Ref Range    Prostate Specific Antigen 0.813 <3.100 ng/mL   Lipid Profile   Result Value Ref Range    Cholesterol 150 <200 mg/dL    Triglycerides 83 <150 mg/dL    HDL Cholesterol 50 23 - 92 mg/dL    LDL Cholesterol Calculated 83 <100 mg/dL    Non HDL Cholesterol 100 <130 mg/dL   Comprehensive metabolic panel   Result Value Ref Range    Sodium 138 134 - 144 mmol/L    Potassium 4.2 3.5 - 5.1 mmol/L    Chloride 105 98 - 107 mmol/L    Carbon Dioxide 26 21 - 31 mmol/L    Anion Gap 7 3 - 14 mmol/L    Glucose 125 (H) 70 - 105 mg/dL    Urea Nitrogen 15 7 - 25 mg/dL    Creatinine 0.95 0.70 - 1.30 mg/dL    GFR Estimate 82 >60 mL/min/[1.73_m2]    GFR Estimate If Black >90 >60 mL/min/[1.73_m2]    Calcium 9.2 8.6 - 10.3 mg/dL    Bilirubin Total 0.3 0.3 - 1.0 mg/dL    Albumin 4.8 3.5 - 5.7 g/dL    Protein Total 7.3 6.4 - 8.9 g/dL    Alkaline Phosphatase 61 34 - 104 U/L    ALT 36 7 - 52 U/L    AST 26 13 - 39 U/L   CBC with platelets differential   Result Value Ref Range    WBC 7.1 4.0 - 11.0 10e9/L    RBC Count 5.35 4.4 - 5.9 10e12/L    Hemoglobin 15.9 13.3 - 17.7 g/dL    Hematocrit 45.9 40.0 - 53.0 %    MCV 86 78 - 100 fl    MCH  29.7 26.5 - 33.0 pg    MCHC 34.6 31.5 - 36.5 g/dL    RDW 12.9 10.0 - 15.0 %    Platelet Count 288 150 - 450 10e9/L    Diff Method Automated Method     % Neutrophils 58.6 %    % Lymphocytes 25.7 %    % Monocytes 10.4 %    % Eosinophils 3.5 %    % Basophils 1.1 %    % Immature Granulocytes 0.7 %    Absolute Neutrophil 4.2 1.6 - 8.3 10e9/L    Absolute Lymphocytes 1.8 0.8 - 5.3 10e9/L    Absolute Monocytes 0.7 0.0 - 1.3 10e9/L    Absolute Eosinophils 0.3 0.0 - 0.7 10e9/L    Absolute Basophils 0.1 0.0 - 0.2 10e9/L    Abs Immature Granulocytes 0.1 0 - 0.4 10e9/L      ASSESSMENT/Plan :    Adin was seen today for physical.    Diagnoses and all orders for this visit:    Visit for preventive health examination    Screening for deficiency anemia  -     CBC with platelets differential; Future  -     CBC with platelets differential    Screening for metabolic disorder  -     Comprehensive metabolic panel; Future  -     Comprehensive metabolic panel    Screening for hyperlipidemia  -     Lipid Profile; Future  -     Lipid Profile    Screening for prostate cancer  -     Prostate Specific Antigen GH; Future  -     Prostate Specific Antigen GH    Need for immunization against influenza  -     HC FLU VAC PRESRV FREE QUAD SPLIT VIR > 6 MONTHS IM    Chronic midline low back pain without sciatica    Neck pain, chronic    Anxiety  -     PARoxetine (PAXIL) 40 MG tablet; Take 2 tablets (80 mg) by mouth daily    Chronic neck pain  -     etodolac (LODINE) 500 MG tablet; TAKE 1 TABLET BY MOUTH TWICE DAILY WITH MEALS    MARIANGEL (obstructive sleep apnea)  -     SLEEP EVALUATION & MANAGEMENT REFERRAL - ADULT -[facility] [phone] (Age 13 and up); Future        Will notify of lab results when available. Discussed diet, exercise and healthy lifestyle changes. Continue current medications. Referred for sleep consultation. Flu shot today.      Juan Rojas MD

## 2019-10-23 NOTE — NURSING NOTE
"No chief complaint on file.      Initial /88   Pulse 66   Temp 96.9  F (36.1  C) (Temporal)   Resp 16   Ht 1.778 m (5' 10\")   Wt 98.9 kg (218 lb)   SpO2 96%   BMI 31.28 kg/m   Estimated body mass index is 31.28 kg/m  as calculated from the following:    Height as of this encounter: 1.778 m (5' 10\").    Weight as of this encounter: 98.9 kg (218 lb).  Medication Reconciliation: complete    Chaya Pimentel LPN    "

## 2019-10-24 ASSESSMENT — ANXIETY QUESTIONNAIRES: GAD7 TOTAL SCORE: 11

## 2019-12-30 ENCOUNTER — OFFICE VISIT (OUTPATIENT)
Dept: PULMONOLOGY | Facility: OTHER | Age: 59
End: 2019-12-30
Attending: FAMILY MEDICINE
Payer: COMMERCIAL

## 2019-12-30 VITALS
OXYGEN SATURATION: 97 % | SYSTOLIC BLOOD PRESSURE: 122 MMHG | DIASTOLIC BLOOD PRESSURE: 70 MMHG | HEART RATE: 69 BPM | RESPIRATION RATE: 16 BRPM | TEMPERATURE: 96.4 F | HEIGHT: 70 IN | WEIGHT: 215 LBS | BODY MASS INDEX: 30.78 KG/M2

## 2019-12-30 DIAGNOSIS — G47.33 OSA (OBSTRUCTIVE SLEEP APNEA): ICD-10-CM

## 2019-12-30 PROCEDURE — G0463 HOSPITAL OUTPT CLINIC VISIT: HCPCS

## 2019-12-30 ASSESSMENT — PAIN SCALES - GENERAL: PAINLEVEL: MODERATE PAIN (5)

## 2019-12-30 ASSESSMENT — MIFFLIN-ST. JEOR: SCORE: 1806.48

## 2019-12-30 NOTE — NURSING NOTE
"Patient presents to the clinic today for a consult for MARIANGEL.  Lore Verduzco LPN 12/30/2019   2:55 PM    Chief Complaint   Patient presents with     Consult     MARIANGEL       Initial /70 (BP Location: Right arm, Patient Position: Sitting, Cuff Size: Adult Regular)   Pulse 69   Temp 96.4  F (35.8  C) (Tympanic)   Resp 16   Ht 5' 10\" (1.778 m)   Wt 215 lb (97.5 kg)   SpO2 97%   BMI 30.85 kg/m   Estimated body mass index is 30.85 kg/m  as calculated from the following:    Height as of this encounter: 5' 10\" (1.778 m).    Weight as of this encounter: 215 lb (97.5 kg).  Medication Reconciliation: complete  Lore Verduzco LPN    "

## 2019-12-30 NOTE — PROGRESS NOTES
"Sleep Medicine Progress Note  Adin Madrid  December 30, 2019  [id number]    Chief Complaint: CPAP use    History of Present Illness: Adin Madrid is a 59 year old male presenting for above complaint.  He is here for an order for new supplies.  He has been with [facility] but has not been happy with their service.  He would like to have a more local CPAP supply/DME.  He had a sleep study performed at [facility] in 2014 and he was shown to have mild sleep apnea.  His apnea hypopnea index was between 9 and 10/h.  He wears his CPAP regularly.  He says he will not sleep without it.  He has found it beneficial.  He did not bring his machine in for a download.  He did not bring in his data card.  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        Past Medical History:  Past Medical History:   Diagnosis Date     Encounter for general adult medical examination without abnormal findings     9/20/04,Satisfactory     Gastro-esophageal reflux disease without esophagitis     resolved     Metatarsalgia     resolved.     Strain of muscle and tendon of unspecified wall of thorax, initial encounter     No Comments Provided       Medications:  Current Outpatient Medications   Medication     diphenhydrAMINE-acetaminophen (TYLENOL PM)  MG tablet     etodolac (LODINE) 500 MG tablet     PARoxetine (PAXIL) 40 MG tablet     No current facility-administered medications for this visit.        Physical Exam:  /70 (BP Location: Right arm, Patient Position: Sitting, Cuff Size: Adult Regular)   Pulse 69   Temp 96.4  F (35.8  C) (Tympanic)   Resp 16   Ht 5' 10\" (1.778 m)   Wt 215 lb (97.5 kg)   SpO2 97%   BMI 30.85 kg/m    Exam is limited to vitals    Assessment and Plan:  59 year old male presenting for old sleep apnea benefiting from CPAP.  He reports excellent compliance with CPAP.  He plans on continuing CPAP.  He has a [employer] S9 CPAP machine which is working well.  He only " needs mask and supplies.  Durable medical equipment options were discussed both in [place] and in [place].  He will let my office know where he would like to have an order sent for his supplies.  Plan will be to continue CPAP at his current settings.  I will have a download of his machine made by the DME for my review..

## 2020-10-23 DIAGNOSIS — F41.9 ANXIETY: ICD-10-CM

## 2020-10-23 DIAGNOSIS — G89.29 CHRONIC NECK PAIN: ICD-10-CM

## 2020-10-23 DIAGNOSIS — M54.2 CHRONIC NECK PAIN: ICD-10-CM

## 2020-10-23 NOTE — TELEPHONE ENCOUNTER
[facility] sent Rx request for the following:   etodolac (LODINE) 500 MG tablet  Sig: TAKE 1 TABLET BY MOUTH TWICE DAILY WITH MEALS    Last Prescription Date:   10/23/2019  Last Fill Qty/Refills:         180, R-3    Last Office Visit:              10/23/2019   Future Office visit:           none  Routing refill request to provider for review/approval because:  NSAID Medications Failed  -Normal ALT on file in past 12 months   -Normal AST on file in past 12 months   -Recent (12 mo) or future (30 days) visit within the authorizing provider's specialty   -Normal CBC on file in past 12 months   -Normal serum creatinine on file in past 12 months           [facility] sent Rx request for the following:   PARoxetine (PAXIL) 40 MG tablet  Sig: Take 2 tablets (80 mg) by mouth daily - Oral    Last Prescription Date:   10/23/2019  Last Fill Qty/Refills:         180, R-3    Last Office Visit:              10/23/2019   Future Office visit:           none  Routing refill request to provider for review/approval because:  -SSRIs Protocol Failed  Recent (12 mo) or future (30 days) visit within the authorizing provider's specialty           Unable to complete prescription refill per RN Medication Refill Policy.................... Telma Rogel RN ....................  10/23/2020   10:30 AM

## 2020-10-26 RX ORDER — ETODOLAC 500 MG
TABLET ORAL
Qty: 180 TABLET | Refills: 3 | Status: SHIPPED | OUTPATIENT
Start: 2020-10-26 | End: 2021-10-26

## 2020-10-26 RX ORDER — PAROXETINE 40 MG/1
TABLET, FILM COATED ORAL
Qty: 180 TABLET | Refills: 3 | Status: SHIPPED | OUTPATIENT
Start: 2020-10-26 | End: 2021-10-26

## 2020-12-27 ENCOUNTER — HEALTH MAINTENANCE LETTER (OUTPATIENT)
Age: 60
End: 2020-12-27

## 2021-02-18 ENCOUNTER — ALLIED HEALTH/NURSE VISIT (OUTPATIENT)
Dept: FAMILY MEDICINE | Facility: OTHER | Age: 61
End: 2021-02-18
Attending: FAMILY MEDICINE
Payer: COMMERCIAL

## 2021-02-18 DIAGNOSIS — Z20.822 EXPOSURE TO 2019 NOVEL CORONAVIRUS: Primary | ICD-10-CM

## 2021-02-18 LAB
SARS-COV-2 RNA RESP QL NAA+PROBE: NORMAL
SPECIMEN SOURCE: NORMAL

## 2021-02-18 PROCEDURE — C9803 HOPD COVID-19 SPEC COLLECT: HCPCS

## 2021-02-18 PROCEDURE — U0005 INFEC AGEN DETEC AMPLI PROBE: HCPCS | Mod: ZL | Performed by: FAMILY MEDICINE

## 2021-02-18 PROCEDURE — U0003 INFECTIOUS AGENT DETECTION BY NUCLEIC ACID (DNA OR RNA); SEVERE ACUTE RESPIRATORY SYNDROME CORONAVIRUS 2 (SARS-COV-2) (CORONAVIRUS DISEASE [COVID-19]), AMPLIFIED PROBE TECHNIQUE, MAKING USE OF HIGH THROUGHPUT TECHNOLOGIES AS DESCRIBED BY CMS-2020-01-R: HCPCS | Mod: ZL | Performed by: FAMILY MEDICINE

## 2021-02-19 LAB
LABORATORY COMMENT REPORT: NORMAL
SARS-COV-2 RNA RESP QL NAA+PROBE: NEGATIVE
SPECIMEN SOURCE: NORMAL

## 2021-04-08 ENCOUNTER — OFFICE VISIT (OUTPATIENT)
Dept: FAMILY MEDICINE | Facility: OTHER | Age: 61
End: 2021-04-08
Attending: FAMILY MEDICINE
Payer: COMMERCIAL

## 2021-04-08 VITALS
DIASTOLIC BLOOD PRESSURE: 84 MMHG | BODY MASS INDEX: 30.39 KG/M2 | OXYGEN SATURATION: 97 % | HEART RATE: 80 BPM | SYSTOLIC BLOOD PRESSURE: 120 MMHG | RESPIRATION RATE: 16 BRPM | TEMPERATURE: 96.8 F | WEIGHT: 211.8 LBS

## 2021-04-08 DIAGNOSIS — H57.8A9 SENSATION OF FOREIGN BODY IN EYE: Primary | ICD-10-CM

## 2021-04-08 PROCEDURE — 99213 OFFICE O/P EST LOW 20 MIN: CPT | Performed by: FAMILY MEDICINE

## 2021-04-08 ASSESSMENT — PAIN SCALES - GENERAL: PAINLEVEL: EXTREME PAIN (8)

## 2021-04-08 NOTE — CONFIDENTIAL NOTE
Tetracaine ordered by Juan Rojas.  Medication administered per verbal order  Lot # [id number]  Exp. 08/2021  Patient tolerated well.  Chaya Pimentel LPN..................4/8/2021   3:17 PM

## 2021-04-08 NOTE — NURSING NOTE
"Chief Complaint   Patient presents with     Eye Problem     Something in left eye for a week       Initial /84   Pulse 80   Temp 96.8  F (36  C) (Tympanic)   Resp 16   Wt 96.1 kg (211 lb 12.8 oz)   SpO2 97%   BMI 30.39 kg/m   Estimated body mass index is 30.39 kg/m  as calculated from the following:    Height as of 12/30/19: 1.778 m (5' 10\").    Weight as of this encounter: 96.1 kg (211 lb 12.8 oz).  Medication Reconciliation: complete    Konrad Hernandez LPN    "

## 2021-04-08 NOTE — PROGRESS NOTES
"Nursing Notes:   Konrad Hernandez LPN  4/8/2021  2:44 PM  Signed  Chief Complaint   Patient presents with     Eye Problem     Something in left eye for a week       Initial /84   Pulse 80   Temp 96.8  F (36  C) (Tympanic)   Resp 16   Wt 96.1 kg (211 lb 12.8 oz)   SpO2 97%   BMI 30.39 kg/m   Estimated body mass index is 30.39 kg/m  as calculated from the following:    Height as of 12/30/19: 1.778 m (5' 10\").    Weight as of this encounter: 96.1 kg (211 lb 12.8 oz).  Medication Reconciliation: complete    Konrad Hernandez LPN        SUBJECTIVE:  Adin Madrid  is a 61 year old male who has been bothered by his left eye since about a week ago.  It doesn't feel painful.  He was having mattering and some weeping.  He talked to his mother who is a retired nurse and she recommended doing a hot pack.  As he was hot packing it and thought he felt a foreign body sensation nasally and superiorly. Vision is ok. Some matter in the a.m.     Past Medical, Family, and Social History reviewed and updated as noted below.   ROS is negative except as noted above       No Known Allergies,   Family History   Problem Relation Age of Onset     Diabetes Mother         Diabetes     Other - See Comments Mother         early dementia     Hyperlipidemia Father         Hyperlipidemia,High cholesterol     Other - See Comments Father         Stroke   ,   Current Outpatient Medications   Medication     diphenhydrAMINE-acetaminophen (TYLENOL PM)  MG tablet     etodolac (LODINE) 500 MG tablet     PARoxetine (PAXIL) 40 MG tablet     No current facility-administered medications for this visit.    ,   Past Medical History:   Diagnosis Date     Encounter for general adult medical examination without abnormal findings     9/20/04,Satisfactory     Gastro-esophageal reflux disease without esophagitis     resolved     Metatarsalgia     resolved.     Strain of muscle and tendon of unspecified wall of thorax, initial encounter     No " Comments Provided   ,   Patient Active Problem List    Diagnosis Date Noted     Jammed finger (interphalangeal joint), initial encounter 04/18/2018     Priority: Medium   ,   Past Surgical History:   Procedure Laterality Date     COLONOSCOPY  01/08/2014 1/2014,Melanosis coli - normal; follow up 10 years     VASECTOMY      01/06    and   Social History     Tobacco Use     Smoking status: Never Smoker     Smokeless tobacco: Never Used   Substance Use Topics     Alcohol use: No     Frequency: Never     OBJECTIVE:  /84   Pulse 80   Temp 96.8  F (36  C) (Tympanic)   Resp 16   Wt 96.1 kg (211 lb 12.8 oz)   SpO2 97%   BMI 30.39 kg/m     EXAM:  Conjunctiva are quiet bilaterally.  Extraocular movements are intact and normal visual fields confrontational he.  No foreign body is seen on the left and fluorescein exam is negative for abrasions or ulceration.  The upper lid is reflected and no foreign body is seen.  No preauricular node is palpable.  ASSESSMENT/Plan :    Adin was seen today for eye problem.    Diagnoses and all orders for this visit:    Sensation of foreign body in eye      I think he likely has just some irritation of his eye.  May be due to allergies.  Discussed use of artificial tears or Patanol drops over-the-counter.  If he has ongoing issues over the course of the next several days to a week, would recommend a visit to the eye doctor.    He had some other concerns today and we asked him to make an appointment for a physical.    Juan Rojas MD

## 2021-08-26 ENCOUNTER — OFFICE VISIT (OUTPATIENT)
Dept: INTERNAL MEDICINE | Facility: OTHER | Age: 61
End: 2021-08-26
Attending: NURSE PRACTITIONER
Payer: COMMERCIAL

## 2021-08-26 VITALS
HEART RATE: 89 BPM | WEIGHT: 209.6 LBS | HEIGHT: 70 IN | DIASTOLIC BLOOD PRESSURE: 88 MMHG | OXYGEN SATURATION: 98 % | TEMPERATURE: 97.5 F | BODY MASS INDEX: 30.01 KG/M2 | RESPIRATION RATE: 18 BRPM | SYSTOLIC BLOOD PRESSURE: 124 MMHG

## 2021-08-26 DIAGNOSIS — L08.9 INFECTION OF FINGER: Primary | ICD-10-CM

## 2021-08-26 PROBLEM — D23.9 DERMATOFIBROMA: Status: ACTIVE | Noted: 2021-08-26

## 2021-08-26 PROBLEM — F41.9 ANXIETY: Status: ACTIVE | Noted: 2018-02-01

## 2021-08-26 PROCEDURE — 99212 OFFICE O/P EST SF 10 MIN: CPT | Performed by: NURSE PRACTITIONER

## 2021-08-26 PROCEDURE — 87186 SC STD MICRODIL/AGAR DIL: CPT | Mod: ZL | Performed by: NURSE PRACTITIONER

## 2021-08-26 PROCEDURE — 87070 CULTURE OTHR SPECIMN AEROBIC: CPT | Mod: ZL | Performed by: NURSE PRACTITIONER

## 2021-08-26 RX ORDER — SULFAMETHOXAZOLE/TRIMETHOPRIM 800-160 MG
1 TABLET ORAL 2 TIMES DAILY
Qty: 14 TABLET | Refills: 0 | Status: SHIPPED | OUTPATIENT
Start: 2021-08-26 | End: 2021-09-02

## 2021-08-26 ASSESSMENT — PAIN SCALES - GENERAL: PAINLEVEL: MODERATE PAIN (5)

## 2021-08-26 ASSESSMENT — MIFFLIN-ST. JEOR: SCORE: 1771.99

## 2021-08-26 ASSESSMENT — ENCOUNTER SYMPTOMS
COLOR CHANGE: 1
WOUND: 1

## 2021-08-26 NOTE — PROGRESS NOTES
"Adin Madrid  : 1960 Age: 61 year old Sex: male MRN: [id number]    CC:   Chief Complaint   Patient presents with     Musculoskeletal Problem     Right trigger finger; infection under nail     NURSE'S NOTES:    Nursing Notes:   Valarie Lee LPN  2021 11:17 AM  Signed  Chief Complaint   Patient presents with     Musculoskeletal Problem     Right trigger finger; infection under nail     Patient presents for possible infection on right index finger/nail; has been going on for about 5 days.    Initial /88 (BP Location: Right arm, Patient Position: Sitting, Cuff Size: Adult Regular)   Pulse 89   Temp 97.5  F (36.4  C) (Tympanic)   Resp 18   Wt 95.1 kg (209 lb 9.6 oz)   SpO2 98%   BMI 30.07 kg/m   Estimated body mass index is 30.07 kg/m  as calculated from the following:    Height as of 19: 1.778 m (5' 10\").    Weight as of this encounter: 95.1 kg (209 lb 9.6 oz).  Medication Reconciliation: complete  FOOD SECURITY SCREENING QUESTIONS  Hunger Vital Signs:  Within the past 12 months we worried whether our food would run out before we got money to buy more. Never  Within the past 12 months the food we bought just didn't last and we didn't have money to get more. Never    Advance care plan reviewed      Valarie Lee LPN         Nursing note reviewed with patient.  Accuracy and completeness verified.      SUBJECTIVE:                                                      HPI:   Adin Madrid presents to the clinic today with complaints of an infection along this medial side of his right index finger nail.  Reports he has been trying to manage it at home and it is just not improving.  Very painful.  Swollen.  He has been soaking it in hot soapy water twice a day.  He has been using topical antibiotic ointment and a Band-Aid.    REVIEW OF SYSTEMS:    Review of Systems   Skin: Positive for color change and wound.   All other systems reviewed and are negative.      Problem List/PMH: Reviewed in " "EMR, and made relevant updates today.  Medications: Reviewed in EMR, and made relevant updates today.  Allergies: Reviewed in EMR, and made relevant updates today.    OBJECTIVE:                                                      /88 (BP Location: Right arm, Patient Position: Sitting, Cuff Size: Adult Regular)   Pulse 89   Temp 97.5  F (36.4  C) (Tympanic)   Resp 18   Ht 1.778 m (5' 10\")   Wt 95.1 kg (209 lb 9.6 oz)   SpO2 98%   BMI 30.07 kg/m      Current Pain Score:   Moderate Pain (5)     Physical Exam  Vitals and nursing note reviewed.   Constitutional:       Appearance: Normal appearance.   Skin:     General: Skin is warm.      Findings: Abscess, erythema and wound present.          Neurological:      Mental Status: He is alert and oriented to person, place, and time. Mental status is at baseline.   Psychiatric:         Mood and Affect: Mood normal.         Behavior: Behavior normal.         Thought Content: Thought content normal.         Judgment: Judgment normal.        Vitals:    08/26/21 1108   Weight: 95.1 kg (209 lb 9.6 oz)      Diagnostics Completed at this Visit:    No results found for any visits on 08/26/21.     ASSESSMENT AND PLAN:    Infection of finger  Area cleaned with chlorhexidine, abscess opened with a 27-gauge needle with immediate return of thick puslike drainage.  Wound culture obtained.  All drainage was milked out of wound, good blood return.  Patient tolerated with minimal pain.  Reports it feels better already to get the pressure released.  Cleaned with wound wash, bacitracin applied covered with Band-Aid.  Instructed patient to change Band-Aid daily and use triple antibiotic ointment at home.  He may soak once daily in Epson salt water.  Follow-up if it is worsening.  We will start him on Bactrim and notify him if culture and sensitivity indicates he needs an alternative medication.  - sulfamethoxazole-trimethoprim (BACTRIM DS) 800-160 MG tablet  Dispense: 14 tablet; " Refill: 0  - Abscess Aerobic Bacterial Culture Routine    I explained my diagnostic considerations and recommendations to the patient, who voiced understanding and agreement with the treatment plan. All questions were answered. We discussed potential side effects of any prescribed or recommended therapies, as well as expectations for response to treatments.    Patient was advised to allow up to 2 days for response on lab results via MyChart and or letter to be sent.    FOLLOW-UP:    Return if symptoms worsen or fail to improve.     Clinic : [phone]  Appointment line: [phone]     DANISHA Mar, AGNP-C  Internal Medicine  08/26/2021 1:30 PM  _____________________________________________________________________________________    Total time spent with this patient was 15 minutes which included chart review, visualization and interpretation of labs and/or images, time spent with patient, and documentation.

## 2021-08-26 NOTE — NURSING NOTE
"Chief Complaint   Patient presents with     Musculoskeletal Problem     Right trigger finger; infection under nail     Patient presents for possible infection on right index finger/nail; has been going on for about 5 days.    Initial /88 (BP Location: Right arm, Patient Position: Sitting, Cuff Size: Adult Regular)   Pulse 89   Temp 97.5  F (36.4  C) (Tympanic)   Resp 18   Wt 95.1 kg (209 lb 9.6 oz)   SpO2 98%   BMI 30.07 kg/m   Estimated body mass index is 30.07 kg/m  as calculated from the following:    Height as of 12/30/19: 1.778 m (5' 10\").    Weight as of this encounter: 95.1 kg (209 lb 9.6 oz).  Medication Reconciliation: complete  FOOD SECURITY SCREENING QUESTIONS  Hunger Vital Signs:  Within the past 12 months we worried whether our food would run out before we got money to buy more. Never  Within the past 12 months the food we bought just didn't last and we didn't have money to get more. Never    Advance care plan reviewed      Valarie Lee LPN    "

## 2021-08-29 LAB — BACTERIA ABSC ANAEROBE+AEROBE CULT: ABNORMAL

## 2021-10-09 ENCOUNTER — HEALTH MAINTENANCE LETTER (OUTPATIENT)
Age: 61
End: 2021-10-09

## 2021-10-25 DIAGNOSIS — G89.29 CHRONIC NECK PAIN: ICD-10-CM

## 2021-10-25 DIAGNOSIS — M54.2 CHRONIC NECK PAIN: ICD-10-CM

## 2021-10-25 DIAGNOSIS — F41.9 ANXIETY: ICD-10-CM

## 2021-10-26 RX ORDER — ETODOLAC 500 MG
TABLET ORAL
Qty: 180 TABLET | Refills: 3 | Status: SHIPPED | OUTPATIENT
Start: 2021-10-26 | End: 2022-12-05

## 2021-10-26 RX ORDER — PAROXETINE 40 MG/1
TABLET, FILM COATED ORAL
Qty: 180 TABLET | Refills: 3 | Status: SHIPPED | OUTPATIENT
Start: 2021-10-26 | End: 2023-03-27

## 2021-10-26 NOTE — TELEPHONE ENCOUNTER
" Disp Refills Start End BHUPENDRA   etodolac (LODINE) 500 MG tablet 180 tablet 3 10/26/2020  No   Sig: TAKE 1 TABLET BY MOUTH TWICE DAILY WITH MEALS      Disp Refills Start End BUHPENDRA   PARoxetine (PAXIL) 40 MG tablet 180 tablet 3 10/26/2020  No   Sig: TAKE 2 TABLETS BY MOUTH DAILY       LOV: 8/26/2021  Future Office visit: No future appointment scheduled at this time.      Routing refill request to provider for review/approval because:  Failed protocol    Requested Prescriptions   Pending Prescriptions Disp Refills     PARoxetine (PAXIL) 40 MG tablet [Pharmacy Med Name: PAROXETINE 40MG TABLETS] 180 tablet 3     Sig: TAKE 2 TABLETS BY MOUTH DAILY       SSRIs Protocol Passed - 10/25/2021  6:07 AM        Passed - Recent (12 mo) or future (30 days) visit within the authorizing provider's specialty     Patient has had an office visit with the authorizing provider or a provider within the authorizing providers department within the previous 12 mos or has a future within next 30 days. See \"Patient Info\" tab in inbasket, or \"Choose Columns\" in Meds & Orders section of the refill encounter.              Passed - Medication is active on med list        Passed - Patient is age 18 or older           etodolac (LODINE) 500 MG tablet [Pharmacy Med Name: ETODOLAC 500MG TABLETS] 180 tablet 3     Sig: TAKE 1 TABLET BY MOUTH TWICE DAILY WITH MEALS       NSAID Medications Failed - 10/25/2021  6:07 AM        Failed - Normal ALT on file in past 12 months     Recent Labs   Lab Test 10/23/19  0945 02/01/18  1224   ALT 36  --    GICHALT  --  25             Failed - Normal AST on file in past 12 months     Recent Labs   Lab Test 10/23/19  0945 02/01/18  1224   AST 26  --    GICHAST  --  23             Failed - Normal CBC on file in past 12 months     Recent Labs   Lab Test 10/23/19  0945 02/01/18  1141 02/01/18  1141   WBC 7.1  --   --    GICHWBC  --   --  7.3   RBC 5.35  --   --    GICHRBC  --   --  5.31   HGB 15.9   < > 16.0   HCT 45.9   < > 45.0 " "     < > 300    < > = values in this interval not displayed.                 Failed - Normal serum creatinine on file in past 12 months     Recent Labs   Lab Test 10/23/19  0945   CR 0.95       Ok to refill medication if creatinine is low          Passed - Blood pressure under 140/90 in past 12 months     BP Readings from Last 3 Encounters:   08/26/21 124/88   04/08/21 120/84   12/30/19 122/70                 Passed - Recent (12 mo) or future (30 days) visit within the authorizing provider's specialty     Patient has had an office visit with the authorizing provider or a provider within the authorizing providers department within the previous 12 mos or has a future within next 30 days. See \"Patient Info\" tab in inbasket, or \"Choose Columns\" in Meds & Orders section of the refill encounter.              Passed - Patient is age 6-64 years        Passed - Medication is active on med list         Unable to complete prescription refill per RN Medication Refill Policy.................... Dennise Reynolds RN ....................  10/26/2021   4:02 PM        "

## 2022-01-01 ENCOUNTER — TRANSFERRED RECORDS (OUTPATIENT)
Dept: MULTI SPECIALTY CLINIC | Facility: CLINIC | Age: 62
End: 2022-01-01

## 2022-01-01 LAB — RETINOPATHY: NORMAL

## 2022-01-26 ENCOUNTER — MYC MEDICAL ADVICE (OUTPATIENT)
Dept: FAMILY MEDICINE | Facility: OTHER | Age: 62
End: 2022-01-26
Payer: COMMERCIAL

## 2022-01-26 NOTE — TELEPHONE ENCOUNTER
I don't know if you want to change the directions so did not abel it up.  Chaya Pimentel LPN..................1/26/2022   5:18 PM

## 2022-01-27 NOTE — TELEPHONE ENCOUNTER
I left a message for the pharmacy telling them the below information.  Chaya Pimentel LPN..................1/27/2022   7:42 AM

## 2022-01-29 ENCOUNTER — HEALTH MAINTENANCE LETTER (OUTPATIENT)
Age: 62
End: 2022-01-29

## 2022-03-01 ENCOUNTER — OFFICE VISIT (OUTPATIENT)
Dept: FAMILY MEDICINE | Facility: OTHER | Age: 62
End: 2022-03-01
Attending: FAMILY MEDICINE
Payer: COMMERCIAL

## 2022-03-01 ENCOUNTER — HOSPITAL ENCOUNTER (OUTPATIENT)
Dept: GENERAL RADIOLOGY | Facility: OTHER | Age: 62
End: 2022-03-01
Attending: FAMILY MEDICINE
Payer: COMMERCIAL

## 2022-03-01 VITALS
DIASTOLIC BLOOD PRESSURE: 88 MMHG | RESPIRATION RATE: 16 BRPM | BODY MASS INDEX: 31.55 KG/M2 | WEIGHT: 213 LBS | HEART RATE: 74 BPM | TEMPERATURE: 97.4 F | OXYGEN SATURATION: 95 % | HEIGHT: 69 IN | SYSTOLIC BLOOD PRESSURE: 138 MMHG

## 2022-03-01 DIAGNOSIS — F33.0 MAJOR DEPRESSIVE DISORDER, RECURRENT EPISODE, MILD (H): ICD-10-CM

## 2022-03-01 DIAGNOSIS — Z13.1 SCREENING FOR DIABETES MELLITUS: ICD-10-CM

## 2022-03-01 DIAGNOSIS — Z13.228 SCREENING FOR METABOLIC DISORDER: ICD-10-CM

## 2022-03-01 DIAGNOSIS — G89.29 CHRONIC NECK PAIN: ICD-10-CM

## 2022-03-01 DIAGNOSIS — M54.2 CHRONIC NECK PAIN: ICD-10-CM

## 2022-03-01 DIAGNOSIS — M54.50 CHRONIC LOW BACK PAIN, UNSPECIFIED BACK PAIN LATERALITY, UNSPECIFIED WHETHER SCIATICA PRESENT: ICD-10-CM

## 2022-03-01 DIAGNOSIS — G89.29 CHRONIC LOW BACK PAIN, UNSPECIFIED BACK PAIN LATERALITY, UNSPECIFIED WHETHER SCIATICA PRESENT: ICD-10-CM

## 2022-03-01 DIAGNOSIS — Z13.29 SCREENING FOR HYPOTHYROIDISM: ICD-10-CM

## 2022-03-01 DIAGNOSIS — F41.9 ANXIETY: ICD-10-CM

## 2022-03-01 DIAGNOSIS — Z13.220 SCREENING FOR HYPERLIPIDEMIA: ICD-10-CM

## 2022-03-01 DIAGNOSIS — Z12.5 SCREENING FOR PROSTATE CANCER: ICD-10-CM

## 2022-03-01 DIAGNOSIS — R73.03 PREDIABETES: ICD-10-CM

## 2022-03-01 DIAGNOSIS — B35.6 TINEA CRURIS: ICD-10-CM

## 2022-03-01 DIAGNOSIS — Z13.0 SCREENING FOR DEFICIENCY ANEMIA: ICD-10-CM

## 2022-03-01 DIAGNOSIS — Z00.00 VISIT FOR PREVENTIVE HEALTH EXAMINATION: Primary | ICD-10-CM

## 2022-03-01 DIAGNOSIS — M53.3 SACROILIAC JOINT DYSFUNCTION: ICD-10-CM

## 2022-03-01 LAB
ALBUMIN SERPL-MCNC: 4.6 G/DL (ref 3.5–5.7)
ALP SERPL-CCNC: 69 U/L (ref 34–104)
ALT SERPL W P-5'-P-CCNC: 23 U/L (ref 7–52)
ANION GAP SERPL CALCULATED.3IONS-SCNC: 8 MMOL/L (ref 3–14)
AST SERPL W P-5'-P-CCNC: 23 U/L (ref 13–39)
BASOPHILS # BLD AUTO: 0.1 10E3/UL (ref 0–0.2)
BASOPHILS NFR BLD AUTO: 1 %
BILIRUB SERPL-MCNC: 0.4 MG/DL (ref 0.3–1)
BUN SERPL-MCNC: 20 MG/DL (ref 7–25)
CALCIUM SERPL-MCNC: 9.5 MG/DL (ref 8.6–10.3)
CHLORIDE BLD-SCNC: 107 MMOL/L (ref 98–107)
CHOLEST SERPL-MCNC: 179 MG/DL
CO2 SERPL-SCNC: 26 MMOL/L (ref 21–31)
CREAT SERPL-MCNC: 0.97 MG/DL (ref 0.7–1.3)
EOSINOPHIL # BLD AUTO: 0.3 10E3/UL (ref 0–0.7)
EOSINOPHIL NFR BLD AUTO: 4 %
ERYTHROCYTE [DISTWIDTH] IN BLOOD BY AUTOMATED COUNT: 12.4 % (ref 10–15)
FASTING STATUS PATIENT QL REPORTED: ABNORMAL
GFR SERPL CREATININE-BSD FRML MDRD: 89 ML/MIN/1.73M2
GLUCOSE BLD-MCNC: 102 MG/DL (ref 70–105)
HBA1C MFR BLD: 6.4 % (ref 4–6.2)
HCT VFR BLD AUTO: 43.5 % (ref 40–53)
HDLC SERPL-MCNC: 36 MG/DL (ref 23–92)
HGB BLD-MCNC: 15.6 G/DL (ref 13.3–17.7)
IMM GRANULOCYTES # BLD: 0 10E3/UL
IMM GRANULOCYTES NFR BLD: 1 %
LDLC SERPL CALC-MCNC: 95 MG/DL
LYMPHOCYTES # BLD AUTO: 1.6 10E3/UL (ref 0.8–5.3)
LYMPHOCYTES NFR BLD AUTO: 23 %
MCH RBC QN AUTO: 30.4 PG (ref 26.5–33)
MCHC RBC AUTO-ENTMCNC: 35.9 G/DL (ref 31.5–36.5)
MCV RBC AUTO: 85 FL (ref 78–100)
MONOCYTES # BLD AUTO: 0.7 10E3/UL (ref 0–1.3)
MONOCYTES NFR BLD AUTO: 10 %
NEUTROPHILS # BLD AUTO: 4.2 10E3/UL (ref 1.6–8.3)
NEUTROPHILS NFR BLD AUTO: 61 %
NONHDLC SERPL-MCNC: 143 MG/DL
NRBC # BLD AUTO: 0 10E3/UL
NRBC BLD AUTO-RTO: 0 /100
PLATELET # BLD AUTO: 291 10E3/UL (ref 150–450)
POTASSIUM BLD-SCNC: 3.9 MMOL/L (ref 3.5–5.1)
PROT SERPL-MCNC: 6.7 G/DL (ref 6.4–8.9)
PSA SERPL-MCNC: 1.07 UG/L (ref 0–4)
RBC # BLD AUTO: 5.13 10E6/UL (ref 4.4–5.9)
SODIUM SERPL-SCNC: 141 MMOL/L (ref 134–144)
TRIGL SERPL-MCNC: 239 MG/DL
TSH SERPL DL<=0.005 MIU/L-ACNC: 1.37 MU/L (ref 0.4–4)
WBC # BLD AUTO: 6.8 10E3/UL (ref 4–11)

## 2022-03-01 PROCEDURE — 72040 X-RAY EXAM NECK SPINE 2-3 VW: CPT

## 2022-03-01 PROCEDURE — 84153 ASSAY OF PSA TOTAL: CPT | Mod: ZL | Performed by: FAMILY MEDICINE

## 2022-03-01 PROCEDURE — 72100 X-RAY EXAM L-S SPINE 2/3 VWS: CPT

## 2022-03-01 PROCEDURE — 80053 COMPREHEN METABOLIC PANEL: CPT | Mod: ZL | Performed by: FAMILY MEDICINE

## 2022-03-01 PROCEDURE — 83036 HEMOGLOBIN GLYCOSYLATED A1C: CPT | Mod: ZL | Performed by: FAMILY MEDICINE

## 2022-03-01 PROCEDURE — 80061 LIPID PANEL: CPT | Mod: ZL | Performed by: FAMILY MEDICINE

## 2022-03-01 PROCEDURE — 36415 COLL VENOUS BLD VENIPUNCTURE: CPT | Mod: ZL | Performed by: FAMILY MEDICINE

## 2022-03-01 PROCEDURE — 84443 ASSAY THYROID STIM HORMONE: CPT | Mod: ZL | Performed by: FAMILY MEDICINE

## 2022-03-01 PROCEDURE — 85025 COMPLETE CBC W/AUTO DIFF WBC: CPT | Mod: ZL | Performed by: FAMILY MEDICINE

## 2022-03-01 PROCEDURE — 99396 PREV VISIT EST AGE 40-64: CPT | Performed by: FAMILY MEDICINE

## 2022-03-01 PROCEDURE — 99215 OFFICE O/P EST HI 40 MIN: CPT | Mod: 25 | Performed by: FAMILY MEDICINE

## 2022-03-01 RX ORDER — KETOCONAZOLE 20 MG/G
CREAM TOPICAL DAILY
Qty: 15 G | Refills: 11 | Status: SHIPPED | OUTPATIENT
Start: 2022-03-01 | End: 2023-04-03

## 2022-03-01 RX ORDER — BUPROPION HYDROCHLORIDE 150 MG/1
150 TABLET ORAL EVERY MORNING
Qty: 90 TABLET | Refills: 11 | Status: SHIPPED | OUTPATIENT
Start: 2022-03-01 | End: 2023-03-27

## 2022-03-01 ASSESSMENT — ANXIETY QUESTIONNAIRES
6. BECOMING EASILY ANNOYED OR IRRITABLE: SEVERAL DAYS
7. FEELING AFRAID AS IF SOMETHING AWFUL MIGHT HAPPEN: SEVERAL DAYS
3. WORRYING TOO MUCH ABOUT DIFFERENT THINGS: SEVERAL DAYS
5. BEING SO RESTLESS THAT IT IS HARD TO SIT STILL: SEVERAL DAYS
GAD7 TOTAL SCORE: 8
1. FEELING NERVOUS, ANXIOUS, OR ON EDGE: SEVERAL DAYS
GAD7 TOTAL SCORE: 8
GAD7 TOTAL SCORE: 8
4. TROUBLE RELAXING: MORE THAN HALF THE DAYS
7. FEELING AFRAID AS IF SOMETHING AWFUL MIGHT HAPPEN: SEVERAL DAYS
2. NOT BEING ABLE TO STOP OR CONTROL WORRYING: SEVERAL DAYS

## 2022-03-01 ASSESSMENT — PATIENT HEALTH QUESTIONNAIRE - PHQ9
SUM OF ALL RESPONSES TO PHQ QUESTIONS 1-9: 8
10. IF YOU CHECKED OFF ANY PROBLEMS, HOW DIFFICULT HAVE THESE PROBLEMS MADE IT FOR YOU TO DO YOUR WORK, TAKE CARE OF THINGS AT HOME, OR GET ALONG WITH OTHER PEOPLE: SOMEWHAT DIFFICULT
SUM OF ALL RESPONSES TO PHQ QUESTIONS 1-9: 8

## 2022-03-01 ASSESSMENT — PAIN SCALES - GENERAL: PAINLEVEL: SEVERE PAIN (6)

## 2022-03-01 NOTE — NURSING NOTE
"Chief Complaint   Patient presents with     Physical       Initial BP (!) 142/96   Pulse 74   Temp 97.4  F (36.3  C) (Temporal)   Resp 16   Ht 1.746 m (5' 8.75\")   Wt 96.6 kg (213 lb)   SpO2 95%   BMI 31.68 kg/m   Estimated body mass index is 31.68 kg/m  as calculated from the following:    Height as of this encounter: 1.746 m (5' 8.75\").    Weight as of this encounter: 96.6 kg (213 lb).  Medication Reconciliation: complete    FOOD SECURITY SCREENING QUESTIONS  Hunger Vital Signs:  Within the past 12 months we worried whether our food would run out before we got money to buy more. Never  Within the past 12 months the food we bought just didn't last and we didn't have money to get more. Never        Advance care directive on file? no  Advance care directive provided to patient? yes     Chaya Pimentel LPN  "

## 2022-03-01 NOTE — PROGRESS NOTES
"Nursing Notes:   Chaya Pimentel LPN  3/1/2022  3:53 PM  Signed  Chief Complaint   Patient presents with     Physical       Initial BP (!) 142/96   Pulse 74   Temp 97.4  F (36.3  C) (Temporal)   Resp 16   Ht 1.746 m (5' 8.75\")   Wt 96.6 kg (213 lb)   SpO2 95%   BMI 31.68 kg/m   Estimated body mass index is 31.68 kg/m  as calculated from the following:    Height as of this encounter: 1.746 m (5' 8.75\").    Weight as of this encounter: 96.6 kg (213 lb).  Medication Reconciliation: complete    FOOD SECURITY SCREENING QUESTIONS  Hunger Vital Signs:  Within the past 12 months we worried whether our food would run out before we got money to buy more. Never  Within the past 12 months the food we bought just didn't last and we didn't have money to get more. Never        Advance care directive on file? no  Advance care directive provided to patient? yes     Chaya Pimentel LPN      SUBJECTIVE:  Adin Madrid  is a 62 year old male comes in today for complete evaluation.    He has concerns about his heart health and hypertension and diabetes.  He had a normal CT cardiac calcium score in 2015.  He feels that he gets short of breath easily.  He is fairly active but feels that he gets lightheaded if he stands up fast or if he coughs a lot.  This is likely due to the fact that he has normal blood pressure or at the lower end of normal.    He has been on Paxil for a long time at 80 mg daily.  He had inquiries about increasing that to 3 tablets but his insurance would not go for it.  Maximum dose is typically 60 but he has tolerated 80 mg without difficulty.    He has had some neck and shoulder issues in the past. He had a neck injury in 1996. He had an x-ray of his cervical spine in 2013 showing moderate disc disease from C4-T1.  He also has some discomfort in his lower back.  He has been to the chiropractor in the past but his insurance would no longer cover that.  He wonders about going to physical therapy and " possibly doing some traction as he has had some relief from that in the past. He had gone to [facility].   He wonders about dry needling as far as whether that would be helpful.  He does have some numbness on the inside of his arm from his bicep to his mid forearm whenever his neck is flared up.  He also will have some numbness down the back of his right leg from his buttocks to his knee when his low back is flared up.  He does continue to take etodolac 1000 mg in the morning (its prescribed 500 mg twice daily) and then at times will use 3 ibuprofen when he gets home.  He does use at home ultrasound machine that seems to help his neck tension in his lower back.    He has been bothered by skin rash for the last 2 and half years in the groin.  It waxes and wanes.  He is keeping it clean and dry as best he can.  Is worse in the summer than in the winter.  He has been using butenafine cream and Chlortrimazole cream.  He also takes a cod liver oil tablet once a day.    He is up-to-date on health maintenance issues with the exception of Shingrix and flu shot.    PHQ 7/15/2019 10/23/2019 3/1/2022   PHQ-9 Total Score 0 5 8   Q9: Thoughts of better off dead/self-harm past 2 weeks Not at all Not at all Several days   F/U: Thoughts of suicide or self-harm - - No   F/U: Safety concerns - - Yes     MODESTA-7 SCORE 2/1/2018 10/23/2019 3/1/2022   Total Score - - 8 (mild anxiety)   Total Score 17 11 8       He uses CPAP and sleeps 6-9 hours per night and does well. He sleeps ok. His energy level is good.      Past Medical, Family, and Social History reviewed and updated as noted below.   ROS is negative except as noted above       No Known Allergies,   Family History   Problem Relation Age of Onset     Diabetes Mother         Diabetes     Other - See Comments Mother         early dementia     Hyperlipidemia Father         Hyperlipidemia,High cholesterol     Other - See Comments Father         Stroke   ,   Current  "Outpatient Medications   Medication     buPROPion (WELLBUTRIN XL) 150 MG 24 hr tablet     diphenhydrAMINE-acetaminophen (TYLENOL PM)  MG tablet     etodolac (LODINE) 500 MG tablet     ketoconazole (NIZORAL) 2 % external cream     PARoxetine (PAXIL) 40 MG tablet     No current facility-administered medications for this visit.   ,   Past Medical History:   Diagnosis Date     Encounter for general adult medical examination without abnormal findings     9/20/04,Satisfactory     Gastro-esophageal reflux disease without esophagitis     resolved     Metatarsalgia     resolved.     Strain of muscle and tendon of unspecified wall of thorax, initial encounter     No Comments Provided   ,   Patient Active Problem List    Diagnosis Date Noted     Dermatofibroma 08/26/2021     Priority: Medium     Formatting of this note might be different from the original.  Right shoulder       Jammed finger (interphalangeal joint), initial encounter 04/18/2018     Priority: Medium     Anxiety 02/01/2018     Priority: Medium     Chronic low back pain 05/18/2015     Priority: Medium     Chronic neck pain 05/18/2015     Priority: Medium   ,   Past Surgical History:   Procedure Laterality Date     COLONOSCOPY  01/08/2014 1/2014,Melanosis coli - normal; follow up 10 years     VASECTOMY      01/06    and   Social History     Tobacco Use     Smoking status: Never Smoker     Smokeless tobacco: Never Used   Substance Use Topics     Alcohol use: No     OBJECTIVE:  /88   Pulse 74   Temp 97.4  F (36.3  C) (Temporal)   Resp 16   Ht 1.746 m (5' 8.75\")   Wt 96.6 kg (213 lb)   SpO2 95%   BMI 31.68 kg/m     EXAM:  General Appearance: Pleasant, alert, appropriate appearance for age. No acute distress  Head Exam: Normal. Normocephalic, atraumatic.  Eye Exam:  Normal external eyes, conjunctivae, lids, cornea. GERALDINE. EOMI  Ear Exam: Normal TM's bilaterally. Normal auditory canals and external ears. Non-tender.  Nose Exam: Normal external " nose, mucus membranes, and septum.  OroPharynx Exam:  Dental hygiene adequate. Normal buccal mucosa. Normal pharynx.  Neck Exam:  Supple, no masses or nodes. No audible bruits.  Neck range of motion is diminished on chin tuck, rotation lateral bending and flexion extension.  Tight across his trapezius and neck strap muscles.  Thyroid Exam: No nodules or enlargement.  Chest/Respiratory Exam: Normal chest wall and respirations. Clear to auscultation.  Cardiovascular Exam: Regular rate and rhythm. S1, S2, no murmur, click, gallop, or rubs.  Gastrointestinal Exam: Soft, non-tender, no masses or organomegaly.  Genitourinary Exam Male: Normal male genitalia.  Slight redness on the right side of the scrotum.  Lymphatic Exam: Non-palpable nodes in neck, clavicular regions.  Musculoskeletal Exam: Back is straight and non-tender, full ROM of upper and lower extremities. Examination of the low back reveals no significant paraspinal muscle spasm.  Normal lumbar range of motion including lateral bending and flexion and extension.  There is no SI joint tenderness.  There is no sciatic notch tenderness.   Standing flat-footed on each foot alternately and extending the back does cause some increased right SI joint pain. SLR is negative bilaterally.  No loss of strength in the lower extremities.   Foot Exam: Left and right foot: good pedal pulses  Skin: no rash or abnormalities  Neurologic Exam: Nonfocal, normal gross motor, tone coordination and no tremor.  Psychiatric Exam: Alert and oriented - appropriate affect.     Results for orders placed or performed during the hospital encounter of 03/01/22   XR Cervical Spine 2/3 Views     Status: None    Narrative    PROCEDURE: XR CERVICAL SPINE 2/3 VWS    HISTORY: Cervical pain.    COMPARISON: 12/2/2013    TECHNIQUE: 3 views views of the cervical spine were obtained.    FINDINGS: Patient has moderate disc disease at C5-6 and C6-7 with mild  disc disease at the remaining levels. The  odontoid view is normal.  There are no fractures or displacements are soft tissues are  unremarkable.       Impression    IMPRESSION:   1. Moderate degenerative disc disease at C5-6 and C6-7.  3. No compression fractures or displacement seen.    ALINE PATTERSON MD         SYSTEM ID:  [id number]   Results for orders placed or performed during the hospital encounter of 03/01/22   XR Lumbar Spine 2/3 Views     Status: None    Narrative    PROCEDURE: XR LUMBAR SPINE 2-3 VIEWS 3/1/2022 3:43 PM    HISTORY: low back pain; Chronic low back pain, unspecified back pain  laterality, unspecified whether sciatica present; Chronic low back  pain, unspecified back pain laterality, unspecified whether sciatica  present    COMPARISONS: None.    TECHNIQUE: AP, lateral and coned-down    FINDINGS: AP views unremarkable.    Lateral view: Mild-to-moderate disease at L3-4, L4-5 with mild disc  disease at the remaining levels. Moderate facet arthrosis at L4-5 and  L5-S1 and the coned-down view demonstrated moderate narrowing of the  neural foramina at L5-S1 and mild to moderate narrowing at L4-5.    No compression deformities.         Impression    IMPRESSION:   1. Mild-to-moderate disc disease at L3-4 and L4-5 with diffuse mild  disease.  2. Progressive facet arthrosis with moderate facet arthrosis at the  lower 2 levels.  3. Moderate narrowing of the neural foramen at L5-S1.  4. No compression deformities.    ALINE PATTERSON MD         SYSTEM ID:  [email or web address]   Results for orders placed or performed in visit on 03/01/22   TSH Reflex GH     Status: Normal   Result Value Ref Range    TSH 1.37 0.40 - 4.00 mU/L   Hemoglobin A1c     Status: Abnormal   Result Value Ref Range    Hemoglobin A1C 6.4 (H) 4.0 - 6.2 %   PSA tumor marker     Status: Normal   Result Value Ref Range    PSA Tumor Marker 1.07 0.00 - 4.00 ug/L    Narrative    The DXI Access PSAS WHO assay is a two site immunoenzymatic   assay. Assay values obtained with different assay  methods cannot be used   interchangeably due to differences in assay methods and reagent specificity.   Lipid Profile     Status: Abnormal   Result Value Ref Range    Cholesterol 179 <200 mg/dL    Triglycerides 239 (H) <150 mg/dL    Direct Measure HDL 36 23 - 92 mg/dL    LDL Cholesterol Calculated 95 <=100 mg/dL    Non HDL Cholesterol 143 (H) <130 mg/dL    Patient Fasting > 8hrs? Unknown     Narrative    Cholesterol  Desirable:  <200 mg/dL    Triglycerides  Normal:  Less than 150 mg/dL  Borderline High:  150-199 mg/dL  High:  200-499 mg/dL  Very High:  Greater than or equal to 500 mg/dL    Direct Measure HDL  Female:  Greater than or equal to 50 mg/dL   Male:  Greater than or equal to 40 mg/dL    LDL Cholesterol  Desirable:  <100mg/dL  Above Desirable:  100-129 mg/dL   Borderline High:  130-159 mg/dL   High:  160-189 mg/dL   Very High:  >= 190 mg/dL    Non HDL Cholesterol  Desirable:  130 mg/dL  Above Desirable:  130-159 mg/dL  Borderline High:  160-189 mg/dL  High:  190-219 mg/dL  Very High:  Greater than or equal to 220 mg/dL   Comprehensive metabolic panel     Status: Normal   Result Value Ref Range    Sodium 141 134 - 144 mmol/L    Potassium 3.9 3.5 - 5.1 mmol/L    Chloride 107 98 - 107 mmol/L    Carbon Dioxide (CO2) 26 21 - 31 mmol/L    Anion Gap 8 3 - 14 mmol/L    Urea Nitrogen 20 7 - 25 mg/dL    Creatinine 0.97 0.70 - 1.30 mg/dL    Calcium 9.5 8.6 - 10.3 mg/dL    Glucose 102 70 - 105 mg/dL    Alkaline Phosphatase 69 34 - 104 U/L    AST 23 13 - 39 U/L    ALT 23 7 - 52 U/L    Protein Total 6.7 6.4 - 8.9 g/dL    Albumin 4.6 3.5 - 5.7 g/dL    Bilirubin Total 0.4 0.3 - 1.0 mg/dL    GFR Estimate 89 >60 mL/min/1.73m2   CBC with platelets and differential     Status: None   Result Value Ref Range    WBC Count 6.8 4.0 - 11.0 10e3/uL    RBC Count 5.13 4.40 - 5.90 10e6/uL    Hemoglobin 15.6 13.3 - 17.7 g/dL    Hematocrit 43.5 40.0 - 53.0 %    MCV 85 78 - 100 fL    MCH 30.4 26.5 - 33.0 pg    MCHC 35.9 31.5 - 36.5 g/dL     RDW 12.4 10.0 - 15.0 %    Platelet Count 291 150 - 450 10e3/uL    % Neutrophils 61 %    % Lymphocytes 23 %    % Monocytes 10 %    % Eosinophils 4 %    % Basophils 1 %    % Immature Granulocytes 1 %    NRBCs per 100 WBC 0 <1 /100    Absolute Neutrophils 4.2 1.6 - 8.3 10e3/uL    Absolute Lymphocytes 1.6 0.8 - 5.3 10e3/uL    Absolute Monocytes 0.7 0.0 - 1.3 10e3/uL    Absolute Eosinophils 0.3 0.0 - 0.7 10e3/uL    Absolute Basophils 0.1 0.0 - 0.2 10e3/uL    Absolute Immature Granulocytes 0.0 <=0.4 10e3/uL    Absolute NRBCs 0.0 10e3/uL   CBC with Platelets & Differential     Status: None    Narrative    The following orders were created for panel order CBC with Platelets & Differential.  Procedure                               Abnormality         Status                     ---------                               -----------         ------                     CBC with platelets and d...[[id number]]                      Final result                 Please view results for these tests on the individual orders.      ASSESSMENT/Plan :    Adin was seen today for physical.    Diagnoses and all orders for this visit:    Visit for preventive health examination    Chronic neck pain  -     XR Cervical Spine 2/3 Views; Future  -     Physical Therapy Referral; Future    Anxiety  -     buPROPion (WELLBUTRIN XL) 150 MG 24 hr tablet; Take 1 tablet (150 mg) by mouth every morning    Chronic low back pain, unspecified back pain laterality, unspecified whether sciatica present  -     XR Lumbar Spine 2/3 Views; Future    Screening for deficiency anemia  -     CBC with Platelets & Differential; Future  -     CBC with Platelets & Differential    Screening for metabolic disorder  -     Comprehensive metabolic panel; Future  -     Comprehensive metabolic panel    Screening for hyperlipidemia  -     Lipid Profile; Future  -     Lipid Profile    Screening for prostate cancer  -     PSA tumor marker; Future  -     PSA tumor marker    Screening for  diabetes mellitus  -     Hemoglobin A1c; Future  -     Hemoglobin A1c    Screening for hypothyroidism  -     TSH Reflex GH; Future  -     TSH Reflex GH    Sacroiliac joint dysfunction  -     Physical Therapy Referral; Future    Major depressive disorder, recurrent episode, mild (H)  -     buPROPion (WELLBUTRIN XL) 150 MG 24 hr tablet; Take 1 tablet (150 mg) by mouth every morning    Tinea cruris  -     ketoconazole (NIZORAL) 2 % external cream; Apply topically daily    Prediabetes      Reviewed labs with him.  Lengthy discussion with regard to prediabetes.  Discussed carbohydrate intake working on diet and exercise and improve fitness.  Discussed insulin resistance in some detail.  He will work on 5 to 10% weight loss.  Reviewed his calcium score.  At this point we elected not to move ahead with a statin.  We will watch blood pressure and see if it comes down with weight loss.  May need to consider blood pressure medication at some point.    I think some of his wellness goals are impaired because of his low back and neck issues so we will send to physical therapy for evaluation treatment to see if that would be helpful.    Discussed dose response curve and his paroxetine dose.  We will leave it at 80 mg daily and will add Wellbutrin 150 mg daily to help with focus.  Sleep is usually pretty good.    Nizoral cream for his rash and if not improving he will let us know.  Might consider low-dose steroid.    A total of 65 minutes was spent with the patient, reviewing records, tests, ordering medications, tests or procedures and documenting clinical information in the EHR in addition to wellness exam.       Juan Rojas MD

## 2022-03-02 ASSESSMENT — ANXIETY QUESTIONNAIRES: GAD7 TOTAL SCORE: 8

## 2022-03-07 ENCOUNTER — HOSPITAL ENCOUNTER (OUTPATIENT)
Dept: PHYSICAL THERAPY | Facility: OTHER | Age: 62
Setting detail: THERAPIES SERIES
End: 2022-03-07
Attending: FAMILY MEDICINE
Payer: COMMERCIAL

## 2022-03-07 DIAGNOSIS — M53.3 SACROILIAC JOINT DYSFUNCTION: ICD-10-CM

## 2022-03-07 DIAGNOSIS — G89.29 CHRONIC NECK PAIN: ICD-10-CM

## 2022-03-07 DIAGNOSIS — M54.2 CHRONIC NECK PAIN: ICD-10-CM

## 2022-03-07 PROCEDURE — 97140 MANUAL THERAPY 1/> REGIONS: CPT | Mod: GP

## 2022-03-07 PROCEDURE — 97161 PT EVAL LOW COMPLEX 20 MIN: CPT | Mod: GP

## 2022-03-07 NOTE — PROGRESS NOTES
03/07/22 0900   General Information   Type of Visit Initial OP Ortho PT Evaluation   Start of Care Date 03/07/22   Referring Physician Juna Rojas MD   Patient/Family Goals Statement To reduce his back and neck pain   Orders Evaluate and Treat   Date of Order 03/01/22   Certification Required? 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 He reports that back in 1996 he had a work injury that is not work comp. 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 A cow pushed back on the bale and back into him on the trailer. Reports that he went to a physician in [place] and was told he tore a ligament in his rhomboid. Since that time he has been dealing with back and neck pain. Currently he has trouble with overhead type movements and work. He has a property that he wants to be able to manage. 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 He has been icing and heating constantly. He has managed with chiropractic care. This has included traction, adjustements, and ultrasound. He has bought an at home US unit. He hasn't been able to do much chiropractic because of insurance issues. He states that traction on both his back and neck. This has given him with good relief. He works machinery for work. Looking around and behind him is difficult. IN regards to his low back, he notes that there is pain in the back and pain down the back of the right leg.  This all flares with busy days at work. The machinery will irritate the back immediately and longer times is worse on the neck.    Impairments A. Pain;C. Swelling;D. Decreased ROM;E. Decreased flexibility;J. Burning;K. Numbness;L. Tingling;N. Headaches;R. Other   Impairment comment Stiffness   Functional Limitations perform activities of daily living;perform required work activities;perform desired leisure / sports activities   Symptom Location neck and low back.    How/Where did it occur Other  (Not work comp - injury on the job in 1996)   Onset date of current episode/exacerbation 03/01/22   Chronicity Chronic   Pain rating (0-10 point scale) Best (/10);Worst (/10)   Best (/10) 5   Worst (/10) 7   Pain quality A. Sharp;C. Aching;B. Dull;G. Cramping   Frequency of pain/symptoms A. Constant   Pain/symptoms are: Worse during the day   Pain/symptoms exacerbated by B. Walking;C. Lifting;D. Carrying;G. Certain positions;H. Overhead reach;I. Bending;J. ADL;K. Home tasks;L. Work tasks   Pain/symptoms eased by C. Rest;E. Changing positions;G. Heat;H. Cold;I. OTC medication(s);K. Other   Pain eased by comment Ultrasound at home.    Progression of symptoms since onset: Unchanged   Prior Level of Function   Prior Level of Function-Mobility Independent   Prior Level of Function-ADLs Independent   Current Level of Function   Patient role/employment history A. Employed   Employment Comments Works machinery and desk work   Living environment House/townhome   Home/community accessibility Stairs can be difficult at times.    Current equipment-Gait/Locomotion None   Current equipment-ADL None   Fall Risk Screen   Fall screen completed by PT   Have you fallen 2 or more times in the past year? No   Have you fallen and had an injury in the past year? Yes   Is patient a fall risk? No   Fall screen comments Patient slipped on ice. No concerns with his balance   Abuse Screen (yes response referral indicated)   Feels Unsafe at Home or  Work/School no   Feels Threatened by Someone no   Does Anyone Try to Keep You From Having Contact with Others or Doing Things Outside Your Home? no   Physical Signs of Abuse Present no   Lumbar Spine/SI Objective Findings   Observation Patient resting comfortably in chair. No apparent distress   Integumentary No significant findings   Posture Slight protraction of shoulders   Gait/Locomotion No major deviations on this date. Slight slower speed   Flexion ROM Able to reach his mid shin with finger tips   Extension ROM Moderately limited as it is painful. Has to use his arms on his thighs when coming back up from a flexed position.    Right Side Bending ROM Painful with this movement. He is able to get approximatly 1 inch short of lateral knee joint line with his fingers   Left Side Bending ROM Able to reach his lateral knee joint line with finger tips   Lumbar ROM Comment Right Rotation: limited secondary to pain, left Rotation: WFL   Hip Screen Scour; negative, KENDRICK/FADIR: negative   Hip Flexion (L2) Strength 5/5   Hip Abduction Strength 5/5   Hip Adduction Strength 5/5   Knee Flexion Strength 5/5   Knee Extension (L3) Strength 5/5   Ankle Dorsiflexion (L4) Strength 5/5   Hamstring Flexibility Min limited bilaterally   Hip Flexor Flexibility Min limited bilaterally   Piriformis Flexibility Min limited on right   Slump Test Increased symptoms on right.    Sensation Testing Intact to light touch   Neurological Testing Comments Increased symptoms with sciatic nerve tensioning   Palpation Discomfort noted in lumbar paraspinals, gluteus medius, and piriformis.    Cervical Spine   Observation See above   Integumentary  No significant findings   Posture Slight protraction of shoulders/forward head   Cervical Flexion ROM WLF with mild pulling in the back of his neck   Cervical Extension ROM 18 degrees   Cervical Right Side Bending ROM 10 degrees   Cervical Left Side Bending ROM 10 degrees   Cervical Right Rotation ROM 40  degrees   Cervical Left Rotation ROM 48 degrees   Shoulder AROM Screen Able to reach overhead with mild discomfort afterwards.    Shoulder Abd (C5) Strength 5/5   Shoulder ER (C5, C6) Strength 5/5   Shoulder IR (C5, C6) Strength 5/5   Elbow Flexion (C5, C6) Strength 5/5   Elbow Extension (C7) Strength 5/5   Shoulder/Wrist/Hand Strength Comments Shoulder flexion: 5/5   Upper Trapezius Flexibility Mod limited   Levator Scapula Flexibility Mod limited   Spurling Test negative   Cervical Distraction Test Improved symptoms   Neer Impingement Test Painful with overhead movements   Sulcus Sign Negative   Palpation Discomfort noted in SCM, upper trap, cervical paraspinals, suboccipitals, levator scap, and rhomboids.    Dermatome/Sensory Testing Intact to light touch   Planned Therapy Interventions   Planned Therapy Interventions joint mobilization;manual therapy;strengthening;stretching;ROM   Planned Modality Interventions   Planned Modality Interventions Cryotherapy;Electrical stimulation;Hot packs;Ultrasound;Traction   Clinical Impression   Criteria for Skilled Therapeutic Interventions Met yes, treatment indicated   PT Diagnosis Impaired mobility, dcereased strength and endurance, neck and low back pain   Influenced by the following impairments pain, stiffness, functional weakness.    Functional limitations due to impairments Reaching overhead, squatting, turning his head while driving. overhead reach   Clinical Presentation Stable/Uncomplicated   Clinical Presentation Rationale Clinical judgement   Clinical Decision Making (Complexity) Low complexity   Therapy Frequency other (see comments)  (1-2 times per week )   Predicted Duration of Therapy Intervention (days/wks) 8 weeks   Risk & Benefits of therapy have been explained Yes   Patient, Family & other staff in agreement with plan of care Yes   Clinical Impression Comments Signs and symptoms consistent with chronic back and neck pain. Patient reports that he did well  with chiropractic treatment and traction. he has a home US unit that is also helpful. He has stiffness in his neck that makes it difficult turning his head while driving. Also gets sore in his UE's when doing more overhead and strenuous work. In regards to his low back, this too get stiff. Will often send pain down the back of his right leg. He would benefit from skilled Pt services in order to reduce his pain and improve his mobility, strength, and endurance   Education Assessment   Barriers to Learning No barriers   ORTHO GOALS   PT Ortho Eval Goals 1;2;3;4   Ortho Goal 1   Goal Identifier Neck   Goal Description Patient will demonstrate 60 degrees of cervical rotation in each direction in order to improve his ability to look behind him while operating vehicles and machinery.    Target Date 05/02/22   Ortho Goal 2   Goal Identifier Overhead   Goal Description Patient will be able to complete overhead work for 5-10 minutes with pain no greater than 4/10 consistently in order to improve his overall mobility at home   Target Date 05/02/22   Ortho Goal 3   Goal Identifier Housework   Goal Description Patient will be able to complete housework, such as dishes, brushcutting, and laundry, with pain no no greater than 4/10 consistently in order to improve his overall mobility and function at home   Target Date 05/02/22   Ortho Goal 4   Goal Identifier Walking   Goal Description Patient will be able to walk for longer than 15 minutes with pain no greater than 3/10 consistently in order to improve his overall mobility around the community   Target Date 05/02/22   Total Evaluation Time   PT Eval, Low Complexity Minutes ([id number]) 40

## 2022-03-09 ENCOUNTER — HOSPITAL ENCOUNTER (OUTPATIENT)
Dept: PHYSICAL THERAPY | Facility: OTHER | Age: 62
Setting detail: THERAPIES SERIES
End: 2022-03-09
Attending: FAMILY MEDICINE
Payer: COMMERCIAL

## 2022-03-09 PROCEDURE — 97012 MECHANICAL TRACTION THERAPY: CPT | Mod: GP

## 2022-03-15 ENCOUNTER — HOSPITAL ENCOUNTER (OUTPATIENT)
Dept: PHYSICAL THERAPY | Facility: OTHER | Age: 62
Setting detail: THERAPIES SERIES
Discharge: HOME | End: 2022-03-15
Attending: FAMILY MEDICINE
Payer: COMMERCIAL

## 2022-03-15 PROCEDURE — 97012 MECHANICAL TRACTION THERAPY: CPT | Mod: GP

## 2022-03-17 ENCOUNTER — THERAPY VISIT (OUTPATIENT)
Dept: CHIROPRACTIC MEDICINE | Facility: OTHER | Age: 62
End: 2022-03-17
Attending: CHIROPRACTOR
Payer: COMMERCIAL

## 2022-03-17 VITALS
RESPIRATION RATE: 16 BRPM | DIASTOLIC BLOOD PRESSURE: 78 MMHG | HEART RATE: 75 BPM | SYSTOLIC BLOOD PRESSURE: 122 MMHG | OXYGEN SATURATION: 97 % | TEMPERATURE: 97.1 F

## 2022-03-17 DIAGNOSIS — M62.830 BACK MUSCLE SPASM: ICD-10-CM

## 2022-03-17 DIAGNOSIS — G89.29 CHRONIC LOW BACK PAIN, UNSPECIFIED BACK PAIN LATERALITY, UNSPECIFIED WHETHER SCIATICA PRESENT: ICD-10-CM

## 2022-03-17 DIAGNOSIS — M54.2 CHRONIC NECK PAIN: ICD-10-CM

## 2022-03-17 DIAGNOSIS — M99.02 SEGMENTAL AND SOMATIC DYSFUNCTION OF THORACIC REGION: ICD-10-CM

## 2022-03-17 DIAGNOSIS — M99.01 SEGMENTAL AND SOMATIC DYSFUNCTION OF CERVICAL REGION: Primary | ICD-10-CM

## 2022-03-17 DIAGNOSIS — G89.29 CHRONIC NECK PAIN: ICD-10-CM

## 2022-03-17 DIAGNOSIS — M54.6 PAIN IN THORACIC SPINE: ICD-10-CM

## 2022-03-17 DIAGNOSIS — M99.04 SEGMENTAL AND SOMATIC DYSFUNCTION OF SACRAL REGION: ICD-10-CM

## 2022-03-17 DIAGNOSIS — M54.50 CHRONIC LOW BACK PAIN, UNSPECIFIED BACK PAIN LATERALITY, UNSPECIFIED WHETHER SCIATICA PRESENT: ICD-10-CM

## 2022-03-17 PROCEDURE — 98941 CHIROPRACT MANJ 3-4 REGIONS: CPT | Mod: AT | Performed by: CHIROPRACTOR

## 2022-03-17 PROCEDURE — 99213 OFFICE O/P EST LOW 20 MIN: CPT | Mod: 25 | Performed by: CHIROPRACTOR

## 2022-03-17 PROCEDURE — 97810 ACUP 1/> WO ESTIM 1ST 15 MIN: CPT | Performed by: CHIROPRACTOR

## 2022-03-17 NOTE — PROGRESS NOTES
Neck feels consatnt, stabbing. 9/10 W24 10/10. Has tried cold and ultrasound, gives relief. Started in 96.  Bilatral lower back feels constant stabbing. 8/10 W24 9/10. Radiates down back of right leg. Has tried cold, gives relief.  Rema Gay on 3/17/2022 at 10:34 AM    Reviewed by EW    PATIENT:  Adin Madrid is a 62 year old male presenting for neck and back pain    PROBLEM:   Date of Initial Visit for this Episode:  3/17/2022    Visit #1    SUBJECTIVE / HPI: Patient referred to our office by physical therapist Harman Altman PT for chiropractic care and acupuncture to help with chronic neck and back pain.  Patient was a previous patient of Dr. Don GASPAR.  Reports finding relief through chiropractic care and spinal decompression/traction therapies.  Reports that eventually insurance would not cover his appointments thus he discontinued course of treatments.  Neck and back pain have continued.  Recently evaluated by primary care provider Dr. Bob RODRIGUEZ who referred patient for physical therapy.  Patient's physical therapist consulted with our office to see if patient was a candidate for chiropractic and acupuncture which I concluded he was.    Patient does have some right forearm symptoms, unclear if this is cervicogenic or local.  Denies any radicular complaints into the lower extremities.  Description and onset:/    Worse with: Nothing specific mentioned  Improved by: History of chiropractic and decompression/traction therapy, home ultrasound therapy device, massage, ice  Additional Features: Right forearm symptoms  Other Health Care Providers seen for this: Dr. Bob RODRIGUEZ, Dr. Don GASPAR, Dr. Naif GASPAR, Harman Altman PT  Previous treatment: Chiropractic care, traction therapy, physical therapy, massage, medication  Previous injury: History of rhomboids tear right side      (DVPRS) Pain Rating Score : Awful, hard to do anything (W24 9/10) (03/17/22 1030)    See flowsheets in chart for  details.  3/17/2022    Neck Disability Index (  Ahmet MUNGUIA and Varun KENDRICK 1991. All rights reserved.; used with permission) 3/17/2022   SECTION 1 - PAIN INTENSITY 4   SECTION 2 - PERSONAL CARE 2   SECTION 3 - LIFTING 2   SECTION 4 - READING 4   SECTION 5 - HEADACHES 3   SECTION 6 - CONCENTRATION 2   SECTION 7 - WORK 3   SECTION 8 - DRIVING 4   SECTION 9 - SLEEPING 3   SECTION 10 - RECREATION 4   Count 10   Sum 31   Raw Score: /50 31   Neck Disability Index Score: (%) 62      Oswestry (JOSELYN) Questionnaire    OSWESTRY DISABILITY INDEX 3/17/2022   Count 9   Sum 20   Oswestry Score (%) 44.44   Some recent data might be hidden        KeeleSTART Back Sub score 3 Total score 7        PAST MEDICAL HISTORY:  Past Medical History:   Diagnosis Date     Encounter for general adult medical examination without abnormal findings     9/20/04,Satisfactory     Gastro-esophageal reflux disease without esophagitis     resolved     Metatarsalgia     resolved.     Strain of muscle and tendon of unspecified wall of thorax, initial encounter     No Comments Provided       PAST SURGICAL HISTORY:  Past Surgical History:   Procedure Laterality Date     COLONOSCOPY  01/08/2014 1/2014,Melanosis coli - normal; follow up 10 years     VASECTOMY      01/06       ALLERGIES:  No Known Allergies    CURRENT MEDICATIONS:  Current Outpatient Medications   Medication Sig Dispense Refill     buPROPion (WELLBUTRIN XL) 150 MG 24 hr tablet Take 1 tablet (150 mg) by mouth every morning 90 tablet 11     diphenhydrAMINE-acetaminophen (TYLENOL PM)  MG tablet Take 0.5 tablets by mouth nightly as needed for sleep       etodolac (LODINE) 500 MG tablet TAKE 1 TABLET BY MOUTH TWICE DAILY WITH MEALS 180 tablet 3     ketoconazole (NIZORAL) 2 % external cream Apply topically daily 15 g 11     PARoxetine (PAXIL) 40 MG tablet TAKE 2 TABLETS BY MOUTH DAILY 180 tablet 3       SOCIAL HISTORY:  Social History     Socioeconomic History     Marital status: Single      Spouse name: Not on file     Number of children: Not on file     Years of education: Not on file     Highest education level: Not on file   Occupational History     Not on file   Tobacco Use     Smoking status: Never Smoker     Smokeless tobacco: Never Used   Vaping Use     Vaping Use: Never used   Substance and Sexual Activity     Alcohol use: No     Drug use: No     Comment: Drug use: No     Sexual activity: Not Currently   Other Topics Concern     Parent/sibling w/ CABG, MI or angioplasty before 65F 55M? Not Asked   Social History Narrative    , works at the [employer] in [place].    in 2010.  Koki Madrid Mother  Lore Ex-spouse  Children None     Social Determinants of Health     Financial Resource Strain: Not on file   Food Insecurity: Not on file   Transportation Needs: Not on file   Physical Activity: Not on file   Stress: Not on file   Social Connections: Not on file   Intimate Partner Violence: Not on file   Housing Stability: Not on file        FAMILY HISTORY:  Family History   Problem Relation Age of Onset     Diabetes Mother         Diabetes     Other - See Comments Mother         early dementia     Hyperlipidemia Father         Hyperlipidemia,High cholesterol     Other - See Comments Father         Stroke       Patient Active Problem List   Diagnosis     Jammed finger (interphalangeal joint), initial encounter     Anxiety     Chronic low back pain     Chronic neck pain     Dermatofibroma         ROS:  The patient denies any fevers, chills, nausea, vomiting, diarrhea, constipation,dysuria, hematuria, or urinary hesitancy or incontinence.  No shortness of breath, chest pain, or rashes.    OBJECTIVE:    DIAGNOSTICS:  Study Result    Narrative & Impression   PROCEDURE: XR LUMBAR SPINE 2-3 VIEWS 3/1/2022 3:43 PM     HISTORY: low back pain; Chronic low back pain, unspecified back pain  laterality, unspecified whether sciatica present; Chronic  low back  pain, unspecified back pain laterality, unspecified whether sciatica  present     COMPARISONS: None.     TECHNIQUE: AP, lateral and coned-down     FINDINGS: AP views unremarkable.     Lateral view: Mild-to-moderate disease at L3-4, L4-5 with mild disc  disease at the remaining levels. Moderate facet arthrosis at L4-5 and  L5-S1 and the coned-down view demonstrated moderate narrowing of the  neural foramina at L5-S1 and mild to moderate narrowing at L4-5.     No compression deformities.                                                                        IMPRESSION:   1. Mild-to-moderate disc disease at L3-4 and L4-5 with diffuse mild  disease.  2. Progressive facet arthrosis with moderate facet arthrosis at the  lower 2 levels.  3. Moderate narrowing of the neural foramen at L5-S1.  4. No compression deformities.     ALINE PATTERSON MD         SYSTEM ID:  [email or web address]     I personally reviewed patient's x-rays and findings were those consistent of Dr. Lake RODRIGUEZ.  Chiropractic assessment does suggest segmental/somatic dysfunction of the left SI joint which must be correlated clinically.    Study Result    Narrative & Impression   PROCEDURE: XR CERVICAL SPINE 2/3 VWS     HISTORY: Cervical pain.     COMPARISON: 12/2/2013     TECHNIQUE: 3 views views of the cervical spine were obtained.     FINDINGS: Patient has moderate disc disease at C5-6 and C6-7 with mild  disc disease at the remaining levels. The odontoid view is normal.  There are no fractures or displacements are soft tissues are  unremarkable.                                                                       IMPRESSION:   1. Moderate degenerative disc disease at C5-6 and C6-7.  3. No compression fractures or displacement seen.     ALINE PATTERSON MD         SYSTEM ID:  [email or web address]           PHYSICAL EXAM:   /78 (BP Location: Right arm, Patient Position: Sitting)   Pulse 75   Temp 97.1  F (36.2  C) (Tympanic)   Resp 16   SpO2 97%   "  GENERAL APPEARANCE: healthy, alert and mild distress   GAIT: NORMAL  PSYCH:  mentation appears normal and affect normal/bright    MUSCULOSKELETAL:   Posture: Anterior head carriage, rounded shoulders.  Low left iliac crest, Low left shoulder, Low right occiput.    Gait:  unremarkable.     Cervical performed actively, measured approximately  ROM:   smooth/halting arc of motion   40/50 flexion    25/45 extension    25/45 RLF  25/45 LLF    70/85 RR         65/85 LR     Mild to moderate pain noted with all AROM    -Maximal Foraminal Compression: -focal mild neck pain.   -Distraction: negative      Thoracic and Lumbar performed actively, measured approximately  ROM:  60/60 flexion 50/60 extension    45/45 RLF    45/45 LLF   45/45 RR      30/45 LR with pain    +Kemps: mid thoracic, left PSIS  - Straight leg raise  +Leg length inequality: right 1/4\" Restricted left heel to buttock   Other:  Ely's: -right, +left    +Tenderness: Elicited left side C2, right side T6, left PSIS  +Muscle spasm: suboccipitals, trapezius, thoracic, lumbar paraspinals, glutes.   +Joint asymmetry and restriction: C2 left lateral flexion, extension and right rotation restriction, T6 extension and left rotation restriction, sacrum extension and left lateral flexion restriction    ASSESSMENT: Adin Madrid is a 62 year old male presenting with chronic neck and chronic back pain symptoms.  Segmental/somatic dysfunction is present of the cervical, thoracic, pelvic regions consistent with patient's pain.  Patient does appear to be a good candidate for chiropractic care and addition to acupuncture therapy.  During consultation with patient prior to today's visit I did inform patient of how acupuncture works and what symptoms to expect post treatment.  Patient also informed that initial trial will last 3-4 visits of chiropractic acupuncture to determine if treatment is effective or not.  Patient understood and agreed to treatment.  All questions answered " to patient satisfaction prior to providing care today.  No contraindications precluding patient from receiving care. Co-treatment and coordination of care planned with patient's physical therapist.     1. Segmental and somatic dysfunction of cervical region    2. Segmental and somatic dysfunction of thoracic region    3. Segmental and somatic dysfunction of sacral region    4. Chronic low back pain, unspecified back pain laterality, unspecified whether sciatica present    5. Chronic neck pain    6. Back muscle spasm    7. Pain in thoracic spine        PLAN    Evaluation and Management:  [id number] Moderate exam established patient 20 min    Procedures:  Modalities:  61921: Acupuncture, for 15 minutes:  Points: Bl 10, 13, 14, 15, 17, 18, 22, 23, 25, 46, 47  For 15 minutes    CMT:  [id number] Chiropractic manipulative treatment 3-4 regions performed   Cervical: Diversified, C2, Supine  Thoracic: Diversified, T6, Prone  Pelvis: Diversified, Sacrum , Side posture    Therapeutic procedures:  None  Deferred to physical therapy    Response to Treatment  Reduction in symptoms as reported by patient    Prognosis: Good    3/17/2022 Plan of Care:  6-8 visits of Chiropractic Care including Spinal Adjustments, acupuncture and/or physiotherapy and active rehabilitation, to include exercises in the office and/or at home to meet care plan goals.     Frequency: 1-2x a week for up to 3 weeks for initial trial.  This to last 4-3 visits.  If progress noted objectively and subjectively continue treatment at 1-2xweek for an additional 3-4 weeks. A reevaluation would be clinically appropriate in 6-8 visits, to determine progress and further course of care.    POC discussed and patient agreeable to plan of care.      3/17/2022 Goals:      Patient will report improved pain by 50%.   Patient will report able to sleep >6 hours.   Patient will report able to drive with 2 less points on the neck disability index.   Patient will demonstrate an improved  ability to complete Activities of Daily Living  as shown by a reported 10-30% reduced score on neck and/or back index.    Patient will demonstrate improved ROM.        INSTRUCTIONS   monitor symptoms and perform activities as tolerated    Follow-up:  Return to care in 5 days.

## 2022-03-22 ENCOUNTER — THERAPY VISIT (OUTPATIENT)
Dept: CHIROPRACTIC MEDICINE | Facility: OTHER | Age: 62
End: 2022-03-22
Attending: CHIROPRACTOR
Payer: COMMERCIAL

## 2022-03-22 VITALS
SYSTOLIC BLOOD PRESSURE: 122 MMHG | OXYGEN SATURATION: 97 % | DIASTOLIC BLOOD PRESSURE: 74 MMHG | RESPIRATION RATE: 18 BRPM | TEMPERATURE: 98 F | HEART RATE: 70 BPM

## 2022-03-22 DIAGNOSIS — M99.02 SEGMENTAL AND SOMATIC DYSFUNCTION OF THORACIC REGION: ICD-10-CM

## 2022-03-22 DIAGNOSIS — G89.29 CHRONIC LOW BACK PAIN, UNSPECIFIED BACK PAIN LATERALITY, UNSPECIFIED WHETHER SCIATICA PRESENT: ICD-10-CM

## 2022-03-22 DIAGNOSIS — M54.50 CHRONIC LOW BACK PAIN, UNSPECIFIED BACK PAIN LATERALITY, UNSPECIFIED WHETHER SCIATICA PRESENT: ICD-10-CM

## 2022-03-22 DIAGNOSIS — M99.04 SEGMENTAL AND SOMATIC DYSFUNCTION OF SACRAL REGION: ICD-10-CM

## 2022-03-22 DIAGNOSIS — M62.830 BACK MUSCLE SPASM: ICD-10-CM

## 2022-03-22 DIAGNOSIS — M99.01 SEGMENTAL AND SOMATIC DYSFUNCTION OF CERVICAL REGION: Primary | ICD-10-CM

## 2022-03-22 DIAGNOSIS — M54.6 PAIN IN THORACIC SPINE: ICD-10-CM

## 2022-03-22 DIAGNOSIS — G89.29 CHRONIC NECK PAIN: ICD-10-CM

## 2022-03-22 DIAGNOSIS — M54.2 CHRONIC NECK PAIN: ICD-10-CM

## 2022-03-22 PROCEDURE — 97810 ACUP 1/> WO ESTIM 1ST 15 MIN: CPT | Performed by: CHIROPRACTOR

## 2022-03-22 PROCEDURE — 98941 CHIROPRACT MANJ 3-4 REGIONS: CPT | Mod: AT | Performed by: CHIROPRACTOR

## 2022-03-22 NOTE — PROGRESS NOTES
Neck is constant stabbing. Radiating down right side of back. 7/10 W24 7/10. Using ice, heat, stretches, and home U/S. These provide a short time relief and increase in mobility. Bilateral lower back is constant stabbing. 7/10 W24 8/10. Using ice, heat, stretches, and home U/S. These provide a short time relief and increase in mobility.  Юлия Van on 3/22/2022 at 2:03 PM    Reviewed by EW      Visit #:  2/6-8    Subjective:  Adin Madrid is a 62 year old male who is seen in f/u up for:        Segmental and somatic dysfunction of cervical region  Segmental and somatic dysfunction of thoracic region  Segmental and somatic dysfunction of sacral region  Chronic low back pain, unspecified back pain laterality, unspecified whether sciatica present  Chronic neck pain  Back muscle spasm  Pain in thoracic spine.     Since last visit on 3/17/2022,  Adin Madrid reports: Very pleased on initial treatment through chiropractic acupuncture.  At this time I am notified that patient has discontinued all physical therapy to pursue chiropractic acupuncture to help with ongoing neck and back symptoms.    After initial treatment symptoms did show improvement.  Patient informs me that majority of symptoms are right-sided both of the neck and back today.      (DVPRS) Pain Rating Score : Focus of attention, prevents doing daily activities (W24 8/10) (03/22/22 1400)     Objective:  The following was observed:  /74 (BP Location: Right arm, Patient Position: Right side, Cuff Size: Adult Regular)   Pulse 70   Temp 98  F (36.7  C) (Tympanic)   Resp 18   SpO2 97%      P: palpatory tenderness Suboccipital ridge bilaterally, right side T6, right side PSIS:    A: static palpation demonstrates intersegmental asymmetry , cervical, thoracic, pelvis  R: motion palpation notes restricted motion, C1 , C2 , T6  and Sacrum   T: muscle spasm at level(s): Cervical paraspinal musculature and suboccipital musculature bilaterally, lumbar  paraspinals bilaterally and quadratus lumborum on right.  Noticeable decrease in spasms of the thoracic spine paraspinals:      Segmental spinal dysfunction/restrictions found at:  :  C1 Left rotation restricted and Right lateral flexion restricted  C2 Right rotation restricted and Extension restriction  T6 Right lateral flexion restricted and Extension restriction  Sacrum Right lateral flexion restricted and Extension restriction.      Assessment: Patient does show good signs of progress.  Informed patient that is not abnormal after the second treatment to have less noticeable improvement.  Patient is still within initial trial of 3-4 chiropractic acupuncture visits.  As stated initially if patient does show progress during initial trial continue with treatment however if little to no progress noted within initial 3-4 visits consider alternative course of care.    Diagnoses:      1. Segmental and somatic dysfunction of cervical region    2. Segmental and somatic dysfunction of thoracic region    3. Segmental and somatic dysfunction of sacral region    4. Chronic low back pain, unspecified back pain laterality, unspecified whether sciatica present    5. Chronic neck pain    6. Back muscle spasm    7. Pain in thoracic spine        Patient's condition:  Patient had restrictions pre-manipulation    Treatment effectiveness:  Post manipulation there is better intersegmental movement, Patient claims to feel looser post manipulation and Muscle spasm is reducing      Procedures:  CMT:  07629 Chiropractic manipulative treatment 3-4 regions performed   Cervical: Diversified, C1 , C2, Supine  Thoracic: Diversified, T6, Prone  Pelvis: Diversified, Sacrum , Side posture    Modalities:  53340: Acupuncture, for 15 minutes:  Points: Bl 10, 13, 15, 18, 21, 22, 23, 24, 46, 47, GB 20, SI 15  For 15 minutes    Therapeutic procedures:  None    Response to Treatment  Reduction in symptoms as reported by patient    Prognosis:  Good    Progress towards Goals: Patient is making progress towards the goal.   Patient will report improved pain by 50%.              Patient will report able to sleep >6 hours.              Patient will report able to drive with 2 less points on the neck disability index.              Patient will demonstrate an improved ability to complete Activities of Daily Living   as shown by a reported 10-30% reduced score on neck and/or back index.               Patient will demonstrate improved ROM.      Recommendations:    Instructions:monitor symptoms     Follow-up:  Return to care in 1 week.

## 2022-03-29 ENCOUNTER — THERAPY VISIT (OUTPATIENT)
Dept: CHIROPRACTIC MEDICINE | Facility: OTHER | Age: 62
End: 2022-03-29
Attending: CHIROPRACTOR
Payer: COMMERCIAL

## 2022-03-29 VITALS
TEMPERATURE: 97.6 F | RESPIRATION RATE: 18 BRPM | OXYGEN SATURATION: 96 % | DIASTOLIC BLOOD PRESSURE: 88 MMHG | SYSTOLIC BLOOD PRESSURE: 140 MMHG | HEART RATE: 74 BPM

## 2022-03-29 DIAGNOSIS — M54.50 CHRONIC LOW BACK PAIN, UNSPECIFIED BACK PAIN LATERALITY, UNSPECIFIED WHETHER SCIATICA PRESENT: ICD-10-CM

## 2022-03-29 DIAGNOSIS — M62.830 BACK MUSCLE SPASM: ICD-10-CM

## 2022-03-29 DIAGNOSIS — M54.6 PAIN IN THORACIC SPINE: ICD-10-CM

## 2022-03-29 DIAGNOSIS — M99.04 SEGMENTAL AND SOMATIC DYSFUNCTION OF SACRAL REGION: ICD-10-CM

## 2022-03-29 DIAGNOSIS — G89.29 CHRONIC LOW BACK PAIN, UNSPECIFIED BACK PAIN LATERALITY, UNSPECIFIED WHETHER SCIATICA PRESENT: ICD-10-CM

## 2022-03-29 DIAGNOSIS — M99.02 SEGMENTAL AND SOMATIC DYSFUNCTION OF THORACIC REGION: ICD-10-CM

## 2022-03-29 DIAGNOSIS — M99.01 SEGMENTAL AND SOMATIC DYSFUNCTION OF CERVICAL REGION: Primary | ICD-10-CM

## 2022-03-29 DIAGNOSIS — G89.29 CHRONIC NECK PAIN: ICD-10-CM

## 2022-03-29 DIAGNOSIS — M54.2 CHRONIC NECK PAIN: ICD-10-CM

## 2022-03-29 PROCEDURE — 97810 ACUP 1/> WO ESTIM 1ST 15 MIN: CPT | Performed by: CHIROPRACTOR

## 2022-03-29 PROCEDURE — 98941 CHIROPRACT MANJ 3-4 REGIONS: CPT | Mod: AT | Performed by: CHIROPRACTOR

## 2022-03-29 NOTE — PROGRESS NOTES
Neck is constant dull burning. Rates 5/10 W24 5/10. Using medications, cold, heat, and exercises it helps a lot.   Bilateral lower back is constant burning. Rates 6/10 W24 6/10. Using medications, cold, heat, and exercises it helps a lot.   Arely De Jesus on 3/29/2022 at 2:35 PM    Reviewed by EW    Visit #:  3/6-8    Subjective:  Adin Madrid is a 62 year old male who is seen in f/u up for:        Segmental and somatic dysfunction of cervical region  Segmental and somatic dysfunction of thoracic region  Segmental and somatic dysfunction of sacral region  Chronic low back pain, unspecified back pain laterality, unspecified whether sciatica present  Chronic neck pain  Back muscle spasm  Pain in thoracic spine.     Since last visit on 3/22/2022,  Adin Madrid reports:  Patient very pleased to report that this is the best range of motion that he has had with his neck and back in quite some time.  Patient informed me that he was able to go on a walk since last visit with greater ease which he was very pleased about.  Patient still interested in returning to physical therapy.  I was able to consult with patient's physical therapist to inform me that his file is still open until Easter approximately.  Patient has also noted that home exercises provided by physical therapy been able to provide greater amounts of relief in helping to reduce and manage his ongoing pain.    (DVPRS) Pain Rating Score : Hard to ignore, avoid usual activities (W24 6/10) (03/29/22 1432)     Objective:  The following was observed:  BP (!) 140/88 (BP Location: Right arm, Patient Position: Sitting)   Pulse 74   Temp 97.6  F (36.4  C) (Tympanic)   Resp 18   SpO2 96%      P: palpatory tendernessSuboccipital ridge bilaterally, T4, T9, PSIS all on left:    A: static palpation demonstrates intersegmental asymmetry , cervical, thoracic, pelvis  R: motion palpation notes restricted motion, C1 , C2 , T4 , T9  and Sacrum   T: muscle spasm at level(s):  Present throughout paraspinal musculature cervical, thoracic, lumbopelvic regions bilaterally, suboccipital musculature bilaterally, quadratus lumborum left greater than right, upper trapezius, infraspinatus, supraspinatus, teres muscle group and posterior deltoid all bilaterally:      Segmental spinal dysfunction/restrictions found at:  :  C1 Left rotation restricted and Right lateral flexion restricted  C2 Left lateral flexion restricted and Extension restriction  T4 Left lateral flexion restricted and Extension restriction  T9 Left lateral flexion restricted and Extension restriction  Sacrum Left lateral flexion restricted and Extension restriction.      Assessment: Patient is showing good signs of progress at this's point.  Continue once a week care with chiropractic acupuncture.  I did encourage patient to follow-up with physical therapy as I do believe that this will be able to provide additional benefit.  Currently we are still in initial trial of chiropractic acupuncture but it does seem that patient has been making progress thus I am recommending continuation of chiropractic acupuncture treatment for full course of care.  However should patient stop showing signs of progress discontinue course of treatment.    Diagnoses:      1. Segmental and somatic dysfunction of cervical region    2. Segmental and somatic dysfunction of thoracic region    3. Segmental and somatic dysfunction of sacral region    4. Chronic low back pain, unspecified back pain laterality, unspecified whether sciatica present    5. Chronic neck pain    6. Back muscle spasm    7. Pain in thoracic spine        Patient's condition:  Patient had restrictions pre-manipulation and Patient symptoms are gradually improving    Treatment effectiveness:  Post manipulation there is better intersegmental movement, Patient claims to feel looser post manipulation, Patients symptoms are getting better, Tenderness is decreasing, Muscle spasm is reducing,  Range of motion is gradually improving and ADL are improving      Procedures:  CMT:  82191 Chiropractic manipulative treatment 3-4 regions performed   Cervical: Diversified, C1 , C2, Supine  Thoracic: Diversified, T4, T9, Prone  Pelvis: Diversified, Sacrum , Side posture    Modalities:  Modalities:  54395: Acupuncture, for 15 minutes:  Points: Bl 10, 13, 15, 18, 21, 22, 23, 24, 46, 47, GB 20, SI 9, 11, 15  For 15 minutes     Therapeutic procedures:  None     Response to Treatment  Reduction in symptoms as reported by patient     Prognosis: Good     Progress towards Goals: Patient is making progress towards the goal.         Patient will report improved pain by 50%.              Patient will report able to sleep >6 hours.              Patient will report able to drive with 2 less points on the neck disability index.              Patient will demonstrate an improved ability to complete Activities of Daily Living   as shown by a reported 10-30% reduced score on neck and/or back index.               Patient will demonstrate improved ROM.       Recommendations:     Instructions:monitor symptoms, contact PT to return to care     Follow-up:  Return to care in 1 week.

## 2022-04-05 ENCOUNTER — THERAPY VISIT (OUTPATIENT)
Dept: CHIROPRACTIC MEDICINE | Facility: OTHER | Age: 62
End: 2022-04-05
Attending: CHIROPRACTOR
Payer: COMMERCIAL

## 2022-04-05 VITALS — SYSTOLIC BLOOD PRESSURE: 134 MMHG | HEART RATE: 68 BPM | RESPIRATION RATE: 18 BRPM | DIASTOLIC BLOOD PRESSURE: 84 MMHG

## 2022-04-05 DIAGNOSIS — M62.830 BACK MUSCLE SPASM: ICD-10-CM

## 2022-04-05 DIAGNOSIS — M54.2 CHRONIC NECK PAIN: ICD-10-CM

## 2022-04-05 DIAGNOSIS — M99.02 SEGMENTAL AND SOMATIC DYSFUNCTION OF THORACIC REGION: ICD-10-CM

## 2022-04-05 DIAGNOSIS — M99.01 SEGMENTAL AND SOMATIC DYSFUNCTION OF CERVICAL REGION: Primary | ICD-10-CM

## 2022-04-05 DIAGNOSIS — M99.04 SEGMENTAL AND SOMATIC DYSFUNCTION OF SACRAL REGION: ICD-10-CM

## 2022-04-05 DIAGNOSIS — M54.50 CHRONIC LOW BACK PAIN, UNSPECIFIED BACK PAIN LATERALITY, UNSPECIFIED WHETHER SCIATICA PRESENT: ICD-10-CM

## 2022-04-05 DIAGNOSIS — M54.6 PAIN IN THORACIC SPINE: ICD-10-CM

## 2022-04-05 DIAGNOSIS — G89.29 CHRONIC NECK PAIN: ICD-10-CM

## 2022-04-05 DIAGNOSIS — G89.29 CHRONIC LOW BACK PAIN, UNSPECIFIED BACK PAIN LATERALITY, UNSPECIFIED WHETHER SCIATICA PRESENT: ICD-10-CM

## 2022-04-05 PROCEDURE — 98941 CHIROPRACT MANJ 3-4 REGIONS: CPT | Mod: AT | Performed by: CHIROPRACTOR

## 2022-04-05 PROCEDURE — 97810 ACUP 1/> WO ESTIM 1ST 15 MIN: CPT | Performed by: CHIROPRACTOR

## 2022-04-05 NOTE — PROGRESS NOTES
"Visit #:  4/6-8    Subjective:  Adin Madrid is a 62 year old male who is seen in f/u up for:        Segmental and somatic dysfunction of cervical region  Segmental and somatic dysfunction of thoracic region  Segmental and somatic dysfunction of sacral region  Chronic low back pain, unspecified back pain laterality, unspecified whether sciatica present  Chronic neck pain  Back muscle spasm  Pain in thoracic spine.     Since last visit on 3/29/2022,  Adin Madrid reports: Patient continues to note improvement through course of chiropractic acupuncture.  Notes that range of motion is continuing to show gains as well as his ability to do more activities with less pain specifically that of walking.  States that he has been doing his at home exercises and treatments which does seem to provide some level of help as well.  Patient still currently waiting on returning to physical therapy pending today's treatment.  Interested in possibly taking time away from care to \"test things out.\"      Area of chief complaint:  Cervical :  Symptoms are graded at 4/10. The quality is described as constantly sore.    Thoracic/lumbar :  Symptoms are graded at 4/10. The quality is described as constantly sore.      (DVPRS) Pain Rating Score : (not recorded)     Objective:  The following was observed:  /84   Pulse 68   Resp 18      P: palpatory tendernessSuboccipital ridge bilaterally, T4, T9, PSIS all on left:    A: static palpation demonstrates intersegmental asymmetry , cervical, thoracic, pelvis  R: motion palpation notes restricted motion, C1 , C2 , T4 , T9  and Sacrum   T: muscle spasm at level(s): Present throughout paraspinal musculature cervical, thoracic, lumbopelvic regions bilaterally, suboccipital musculature bilaterally, quadratus lumborum left greater than right, upper trapezius, infraspinatus, supraspinatus, teres muscle group and posterior deltoid all bilaterally:       Segmental spinal dysfunction/restrictions " Detail Level: Detailed found at:  :  C1 Left rotation restricted and Right lateral flexion restricted  C2 Left lateral flexion restricted and Extension restriction  T4 Left lateral flexion restricted and Extension restriction  T9 Left lateral flexion restricted and Extension restriction  Sacrum Left lateral flexion restricted and Extension restriction.      Assessment: Patient is showing excellent progress thus far.  I do believe that a trial away from care for 10-14 days is reasonable given patient's progress as well as home care measures.  Patient was instructed to contact her office sooner if symptoms seem to return.  I did encourage patient again to contact physical therapy for ongoing care.    Diagnoses:      1. Segmental and somatic dysfunction of cervical region    2. Segmental and somatic dysfunction of thoracic region    3. Segmental and somatic dysfunction of sacral region    4. Chronic low back pain, unspecified back pain laterality, unspecified whether sciatica present    5. Chronic neck pain    6. Back muscle spasm    7. Pain in thoracic spine        Patient's condition:  Patient had restrictions pre-manipulation and Patient symptoms are gradually improving    Treatment effectiveness:  Post manipulation there is better intersegmental movement, Patient claims to feel looser post manipulation, Patients symptoms are getting better, Tenderness is decreasing, Range of motion is gradually improving and Patient is able to do some ADL's with less pain      Procedures:  CMT:  64319 Chiropractic manipulative treatment 3-4 regions performed   Cervical: Diversified, C1 , C2, Supine  Thoracic: Diversified, T4, T9, Prone  Pelvis: Diversified, Sacrum , Side posture     Modalities:  Modalities:  36928: Acupuncture, for 15 minutes:  Points: Bl 9, 10, 13, 15, 18, 21, 22, 23, 24, 46, 47, GB 20, SI 9, 11, GV 20, 21  For 15 minutes     Therapeutic procedures:  None     Response to Treatment  Reduction in symptoms as reported by  Detail Level: Generalized patient     Prognosis: Good     Progress towards Goals: Patient is making progress towards the goal.         Patient will report improved pain by 50%.              Patient will report able to sleep >6 hours.              Patient will report able to drive with 2 less points on the neck disability index.              Patient will demonstrate an improved ability to complete Activities of Daily Living   as shown by a reported 10-30% reduced score on neck and/or back index.               Patient will demonstrate improved ROM.       Recommendations:     Instructions:monitor symptoms, contact PT to return to care     Follow-up:  Return to care in 10-14 days

## 2022-04-06 NOTE — PATIENT INSTRUCTIONS
Instructions:monitor symptoms, contact PT to return to care     Follow-up:  Return to care in 10-14 days

## 2022-04-13 ENCOUNTER — THERAPY VISIT (OUTPATIENT)
Dept: CHIROPRACTIC MEDICINE | Facility: OTHER | Age: 62
End: 2022-04-13
Attending: CHIROPRACTOR
Payer: COMMERCIAL

## 2022-04-13 VITALS — TEMPERATURE: 97.3 F | OXYGEN SATURATION: 95 % | RESPIRATION RATE: 18 BRPM | HEART RATE: 73 BPM

## 2022-04-13 DIAGNOSIS — G89.29 CHRONIC NECK PAIN: ICD-10-CM

## 2022-04-13 DIAGNOSIS — G89.29 CHRONIC LOW BACK PAIN, UNSPECIFIED BACK PAIN LATERALITY, UNSPECIFIED WHETHER SCIATICA PRESENT: ICD-10-CM

## 2022-04-13 DIAGNOSIS — M99.02 SEGMENTAL AND SOMATIC DYSFUNCTION OF THORACIC REGION: Primary | ICD-10-CM

## 2022-04-13 DIAGNOSIS — M54.50 CHRONIC LOW BACK PAIN, UNSPECIFIED BACK PAIN LATERALITY, UNSPECIFIED WHETHER SCIATICA PRESENT: ICD-10-CM

## 2022-04-13 DIAGNOSIS — M62.830 BACK MUSCLE SPASM: ICD-10-CM

## 2022-04-13 DIAGNOSIS — M54.2 CHRONIC NECK PAIN: ICD-10-CM

## 2022-04-13 DIAGNOSIS — M99.04 SEGMENTAL AND SOMATIC DYSFUNCTION OF SACRAL REGION: ICD-10-CM

## 2022-04-13 DIAGNOSIS — M54.6 PAIN IN THORACIC SPINE: ICD-10-CM

## 2022-04-13 DIAGNOSIS — M99.01 SEGMENTAL AND SOMATIC DYSFUNCTION OF CERVICAL REGION: ICD-10-CM

## 2022-04-13 PROCEDURE — 98941 CHIROPRACT MANJ 3-4 REGIONS: CPT | Mod: AT | Performed by: CHIROPRACTOR

## 2022-04-13 PROCEDURE — 97810 ACUP 1/> WO ESTIM 1ST 15 MIN: CPT | Performed by: CHIROPRACTOR

## 2022-04-13 NOTE — PROGRESS NOTES
Felt relief for several days after last visit. Flared over the weekend again. Bilateral lower back is constantly sore. Radiating down right leg. 5/10 W24 6/10. Using medications, ice, and U/S. These provide a decrease in pain. Neck is constant sore. Radiating down into right forearm. 6/10 W24 6/10. Using medications, ice, and U/S. These provide a decrease in pain.   Юлия Van on 4/13/2022 at 1:58 PM    Reviewed by EW    Visit #:  5/6-8    Subjective:  Adin Madrid is a 62 year old male who is seen in f/u up for:        Segmental and somatic dysfunction of thoracic region  Segmental and somatic dysfunction of cervical region  Segmental and somatic dysfunction of sacral region  Chronic low back pain, unspecified back pain laterality, unspecified whether sciatica present  Chronic neck pain  Back muscle spasm  Pain in thoracic spine.     Since last visit on 4/5/2022,  Adin Madrid reports: Patient presents ahead of originally planned schedule as back and neck pain symptoms have flared.  Unsure of causation of flare of symptoms.  Denies any overuse or traumatic events recently since the last encounter.  Current weather patterns have been less than favorable for arthritis as weather has changed from warm to cold very quickly and is quite damp.  Patient agreed this might be contributing to flareup of symptoms that we are seeing.      (DVPRS) Pain Rating Score : Hard to ignore, avoid usual activities (W24 6/10) (04/13/22 1359)     Objective:  The following was observed:  Pulse 73   Temp 97.3  F (36.3  C) (Tympanic)   Resp 18   SpO2 95%      P: palpatory tendernessLevator scapulae, Lumbar erector spine, Quad lumb, Rhomboids, Sub-occipital, T-spine paraspinal and Traps Bilaterally  A: static palpation demonstrates intersegmental asymmetry , cervical, thoracic, pelvis  R: motion palpation notes restricted motion, C1 , C2 , T4 , T8  and Sacrum   T: muscle spasm at level(s): Levator scapulae, Lumbar erector spine, Quad  lumb, Rhomboids, Sub-occipital, T-spine paraspinal and Traps Bilaterally    Segmental spinal dysfunction/restrictions found at:  :  C1 Left rotation restricted and Right lateral flexion restricted  C2 Right rotation restricted and Extension restriction  T4 Left lateral flexion restricted and Extension restriction  T8 Extension restriction  Sacrum Left lateral flexion restricted and Extension restriction.      Assessment: Acute exacerbation of chronic symptoms that patient is currently under care for with our office.  Strongly suspect that recent changes in weather patterns are contributing to the symptoms.  Due to acute exacerbation of symptoms we will plan to follow-up with patient within about 1 week.  Suspect that 1 patterns of weather began to stabilize will begin to be able to lengthen duration between patient visits as he has been showing tremendous improvement through course of chiropractic acupuncture.    Diagnoses:      1. Segmental and somatic dysfunction of thoracic region    2. Segmental and somatic dysfunction of cervical region    3. Segmental and somatic dysfunction of sacral region    4. Chronic low back pain, unspecified back pain laterality, unspecified whether sciatica present    5. Chronic neck pain    6. Back muscle spasm    7. Pain in thoracic spine        Patient's condition:  Patient had restrictions pre-manipulation and Patient symptoms are worsed due to unsure, possible weather changes.    Treatment effectiveness:  Post manipulation there is better intersegmental movement and Patient claims to feel looser post manipulation      Procedures:  CMT:  39502 Chiropractic manipulative treatment 3-4 regions performed   Cervical: Diversified, C1 , C2, Supine  Thoracic: Diversified, T4, T8, Prone  Pelvis: Diversified, Sacrum , Side posture    Modalities:  13129: Acupuncture, for 15 minutes:  Points: Bl 10, 11, 13, 14, 15, 17, 18, 19 ,22, 23, 24, 25 46, 47 SI 9, 11, 13, GV 16, 20, 21  For 15  minutes    Therapeutic procedures:  None    Response to Treatment  Reduction in symptoms as reported by patient    Prognosis: Good    Progress towards Goals: acute exacerbation 4/13/2022  Patient will report improved pain by 50%.              Patient will report able to sleep >6 hours.              Patient will report able to drive with 2 less points on the neck disability index.              Patient will demonstrate an improved ability to complete Activities of Daily Living   as shown by a reported 10-30% reduced score on neck and/or back index.               Patient will demonstrate improved ROM.    Recommendations:    Instructions:heat 15 minutes every other hour as needed    Follow-up:  Return to care in 1 week.

## 2022-05-05 ENCOUNTER — THERAPY VISIT (OUTPATIENT)
Dept: CHIROPRACTIC MEDICINE | Facility: OTHER | Age: 62
End: 2022-05-05
Attending: CHIROPRACTOR
Payer: COMMERCIAL

## 2022-05-05 VITALS — HEART RATE: 95 BPM | OXYGEN SATURATION: 95 % | RESPIRATION RATE: 20 BRPM

## 2022-05-05 DIAGNOSIS — M99.04 SEGMENTAL AND SOMATIC DYSFUNCTION OF SACRAL REGION: ICD-10-CM

## 2022-05-05 DIAGNOSIS — G89.29 CHRONIC LOW BACK PAIN, UNSPECIFIED BACK PAIN LATERALITY, UNSPECIFIED WHETHER SCIATICA PRESENT: ICD-10-CM

## 2022-05-05 DIAGNOSIS — M54.2 CHRONIC NECK PAIN: ICD-10-CM

## 2022-05-05 DIAGNOSIS — M54.50 CHRONIC LOW BACK PAIN, UNSPECIFIED BACK PAIN LATERALITY, UNSPECIFIED WHETHER SCIATICA PRESENT: ICD-10-CM

## 2022-05-05 DIAGNOSIS — G89.29 CHRONIC NECK PAIN: ICD-10-CM

## 2022-05-05 DIAGNOSIS — M99.02 SEGMENTAL AND SOMATIC DYSFUNCTION OF THORACIC REGION: Primary | ICD-10-CM

## 2022-05-05 DIAGNOSIS — M54.6 PAIN IN THORACIC SPINE: ICD-10-CM

## 2022-05-05 DIAGNOSIS — M99.01 SEGMENTAL AND SOMATIC DYSFUNCTION OF CERVICAL REGION: ICD-10-CM

## 2022-05-05 DIAGNOSIS — M62.830 BACK MUSCLE SPASM: ICD-10-CM

## 2022-05-05 PROCEDURE — 97810 ACUP 1/> WO ESTIM 1ST 15 MIN: CPT | Performed by: CHIROPRACTOR

## 2022-05-05 PROCEDURE — 98941 CHIROPRACT MANJ 3-4 REGIONS: CPT | Mod: AT | Performed by: CHIROPRACTOR

## 2022-05-05 NOTE — PROGRESS NOTES
Bilateral lower back is constant sore. 7/10 W24 8/10. Using home U/S unit this decreased tightness and increased ROM. Neck is constant aching along with constant headaches. Says last night he had spasms in traps and biceps.7/10 W24 8/10. Using home U/S unit this decreased tightness and increased ROM.  Юлия Van on 5/5/2022 at 2:30 PM    Reviewed by EW    Visit #:  6/6-8    Subjective:  Adin Madrid is a 62 year old male who is seen in f/u up for:        Segmental and somatic dysfunction of thoracic region  Segmental and somatic dysfunction of cervical region  Segmental and somatic dysfunction of sacral region  Chronic low back pain, unspecified back pain laterality, unspecified whether sciatica present  Chronic neck pain  Pain in thoracic spine  Back muscle spasm.     Since last visit on 4/13/2022,  Adin Madrid reports: Symptoms have been slowly returning.  Patient's work schedule is picking up which does make follow-up care difficult to plan for or anticipate.  No traumatic events noted with return of symptoms.  Patient does have a physically demanding/laborious job.  Notes home care measures have been providing some level of help however symptoms are still present which is why patient presents for evaluation and treatment today.  No radicular concerns of the upper or lower extremities, no reported loss of bowel or bladder.    Patient is interested in utilizing acupuncture treatment prior to any adjusting performed today.    (DVPRS) Pain Rating Score : Focus of attention, prevents doing daily activities (W24 8/10) (05/05/22 4239)     Objective:  The following was observed:  Pulse 95   Resp 20   SpO2 95%      P: palpatory tenderness Right cervical spine into CT junction, left PSIS:    A: static palpation demonstrates intersegmental asymmetry , cervical, thoracic, pelvis  R: motion palpation notes restricted motion, C1 , C2 , T1 , T2 , T9  and Sacrum   T: muscle spasm at level(s): Levator scapulae, Lumbar  erector spine, Quad lumb, Rhomboids, Sub-occipital, T-spine paraspinal and Traps Bilaterally, specific spasms right-sided neck splenius capitis, suboccipitals, paraspinal musculature, levator scapula, scalenes posterior and middle aspect on right    Segmental spinal dysfunction/restrictions found at:  :  C1 Left rotation restricted and Right lateral flexion restricted  C2 Right rotation restricted and Extension restriction  T1 Right lateral flexion restricted and Extension restriction  T2 Left lateral flexion restricted and Extension restriction  T9 Extension restriction  Sacrum Left lateral flexion restricted and Extension restriction.      Assessment: Aggravation of chronic neck and back pain symptoms.  Patient's work schedule is quite busy for the upcoming months.  While this does make follow-up care difficult and will likely slower progress of patient care, I do believe the patient would still benefit from chiropractic care with acupuncture.  Unfortunately this will be needed to be done on as needed basis due to patient's work schedule.    Diagnoses:      1. Segmental and somatic dysfunction of thoracic region    2. Segmental and somatic dysfunction of cervical region    3. Segmental and somatic dysfunction of sacral region    4. Chronic low back pain, unspecified back pain laterality, unspecified whether sciatica present    5. Chronic neck pain    6. Pain in thoracic spine    7. Back muscle spasm        Patient's condition:  Patient had restrictions pre-manipulation    Treatment effectiveness:  Post manipulation there is better intersegmental movement and Patient claims to feel looser post manipulation      Procedures:  CMT:  94367 Chiropractic manipulative treatment 3-4 regions performed   Cervical: Diversified, C1 , C2, Supine  Thoracic: Diversified, T1, T2, T9, Prone  Pelvis: Diversified, Sacrum , Side posture    Modalities:  42928: Acupuncture, for 15 minutes:  Points: Bl 10, 11, 12, 13, 14, 15, 16, 17, 18,  19, 20, 21, 22, 23, 24, 25, 26 46, 47, SI 9, 11, 13, 15,GB 20 GV 20, 21  For 15 minutes    Therapeutic procedures:  None    Response to Treatment  Reduction in symptoms as reported by patient    Prognosis: Good    Progress towards Goals: Aggravation of symptoms noted 5/5/2022  Patient will report improved pain by 50%.              Patient will report able to sleep >6 hours.              Patient will report able to drive with 2 less points on the neck disability index.              Patient will demonstrate an improved ability to complete Activities of Daily Living   as shown by a reported 10-30% reduced score on neck and/or back index.               Patient will demonstrate improved ROM.    Recommendations:    Instructions:ice 20 minutes every other hour as needed and heat 15 minutes every other hour as needed    Follow-up:  Continue treatment PRN.

## 2022-05-26 ENCOUNTER — THERAPY VISIT (OUTPATIENT)
Dept: CHIROPRACTIC MEDICINE | Facility: OTHER | Age: 62
End: 2022-05-26
Attending: CHIROPRACTOR
Payer: COMMERCIAL

## 2022-05-26 VITALS
OXYGEN SATURATION: 95 % | RESPIRATION RATE: 16 BRPM | HEART RATE: 93 BPM | TEMPERATURE: 98 F | DIASTOLIC BLOOD PRESSURE: 88 MMHG | SYSTOLIC BLOOD PRESSURE: 148 MMHG

## 2022-05-26 DIAGNOSIS — G89.29 CHRONIC LOW BACK PAIN, UNSPECIFIED BACK PAIN LATERALITY, UNSPECIFIED WHETHER SCIATICA PRESENT: ICD-10-CM

## 2022-05-26 DIAGNOSIS — M99.01 SEGMENTAL AND SOMATIC DYSFUNCTION OF CERVICAL REGION: ICD-10-CM

## 2022-05-26 DIAGNOSIS — M99.02 SEGMENTAL AND SOMATIC DYSFUNCTION OF THORACIC REGION: Primary | ICD-10-CM

## 2022-05-26 DIAGNOSIS — M99.04 SEGMENTAL AND SOMATIC DYSFUNCTION OF SACRAL REGION: ICD-10-CM

## 2022-05-26 DIAGNOSIS — M54.2 CHRONIC NECK PAIN: ICD-10-CM

## 2022-05-26 DIAGNOSIS — M62.830 BACK MUSCLE SPASM: ICD-10-CM

## 2022-05-26 DIAGNOSIS — G89.29 CHRONIC NECK PAIN: ICD-10-CM

## 2022-05-26 DIAGNOSIS — M54.50 CHRONIC LOW BACK PAIN, UNSPECIFIED BACK PAIN LATERALITY, UNSPECIFIED WHETHER SCIATICA PRESENT: ICD-10-CM

## 2022-05-26 DIAGNOSIS — M54.6 PAIN IN THORACIC SPINE: ICD-10-CM

## 2022-05-26 PROCEDURE — 97810 ACUP 1/> WO ESTIM 1ST 15 MIN: CPT | Performed by: CHIROPRACTOR

## 2022-05-26 PROCEDURE — 98941 CHIROPRACT MANJ 3-4 REGIONS: CPT | Mod: AT | Performed by: CHIROPRACTOR

## 2022-05-26 NOTE — PROGRESS NOTES
Neck constant burning radiating down into back. Tingling in the left arm. 7/10 W24 8/10. Using cold packs and the US with temporary relief.  Lower right back with intermittent jarring. radiating down into the left hip. 6/10 W24 8/10. Using cold packs and the US with temporary relief.   Julee Deshpande on 5/26/2022 at 2:18 PM    Reviewed by EW    Visit #:  7/6-8    Subjective:  Adin Madrid is a 62 year old male who is seen in f/u up for:        Segmental and somatic dysfunction of thoracic region  Segmental and somatic dysfunction of cervical region  Segmental and somatic dysfunction of sacral region  Chronic low back pain, unspecified back pain laterality, unspecified whether sciatica present  Chronic neck pain  Pain in thoracic spine  Back muscle spasm.     Since last visit on 5/5/2022,  Adin Madrid reports: Symptoms did well following last encounter.  Patient notes that this is a very busy season for him for work which makes it difficult for him to plan follow-up care.  Notes that symptoms have been worsening due to work-related activities.  Notes that he often will sit turn looking backwards for extended periods of time.  This does cause quite a bit of pain from the neck into the back.  No recent traumatic events associated with symptoms.  Pain also noted into the right shoulder, some radiating symptoms into the left triceps.  Denies any weakness of the upper or lower extremities or loss of bowel or bladder.  Home care measures to provide some level of relief.  Patient has found through course of chiropractic acupuncture this has been able to provide good levels of relief while allowing the patient to perform home exercises from physical therapy more effectively.    (DVPRS) Pain Rating Score : Hard to ignore, avoid usual activities (W24 8/10) (05/26/22 1413)     Objective:  The following was observed:  BP (!) 148/88 (BP Location: Left arm, Patient Position: Sitting, Cuff Size: Adult Regular)   Pulse 93   Temp 98   F (36.7  C) (Tympanic)   Resp 16   SpO2 95%      P: palpatory tendernessLevator scapulae, Lumbar erector spine, Quad lumb, Rhomboids, Sub-occipital, T-spine paraspinal and Traps Bilaterally  A: static palpation demonstrates intersegmental asymmetry , cervical, thoracic, pelvis  R: motion palpation notes restricted motion, C1 , C2 , T6 , T12  and Sacrum   T: muscle spasm at level(s): Levator scapulae, Lumbar erector spine, Quad lumb, Rhomboids, Sub-occipital, T-spine paraspinal, Traps and cervical paraspinals:      Segmental spinal dysfunction/restrictions found at:  :  C1 Left rotation restricted and Right lateral flexion restricted  C2 Right rotation restricted and Extension restriction  T6 Extension restriction  T12 Extension restriction  Sacrum Left lateral flexion restricted and Extension restriction.      Assessment: Aggravation of symptoms most likely stemming from repetitive/overuse.  Chiropractic acupuncture does seem to provide quite a bit of help for the patient.  Adjustments today were performed post acupuncture as this seems to have worked better for the patient in the past.  Cervical adjustment much more restricted than originally anticipated.  Intersegmental motion did improve however this was not quite as good as last encounter with patient.  Due to work schedule it is quite difficult to plan for patient follow-up visits and thus we will do so on an as-needed basis for flareup care.    Diagnoses:      1. Segmental and somatic dysfunction of thoracic region    2. Segmental and somatic dysfunction of cervical region    3. Segmental and somatic dysfunction of sacral region    4. Chronic low back pain, unspecified back pain laterality, unspecified whether sciatica present    5. Chronic neck pain    6. Pain in thoracic spine    7. Back muscle spasm        Patient's condition:  Patient had restrictions pre-manipulation    Treatment effectiveness:  Post manipulation there is better intersegmental movement  and Patient claims to feel looser post manipulation      Procedures:  CMT:  98839 Chiropractic manipulative treatment 3-4 regions performed   Cervical: Diversified, C1 , C2, Supine adjustment was difficult, mild improvement   Thoracic: Diversified, T6, T12, Prone  Pelvis: Diversified, Sacrum , Side posture    Modalities:  00800: Acupuncture, for 15 minutes:  Points: Bl 10, 11, 12, 13, 14, 15, 16, 17, 18, 19, 20, 21, 22, 23, 24, 25, 26 46, 47, SI 9, 11, 13, 15,GB 20 TW9, 12, 13  For 15 minutes     Therapeutic procedures:  None     Response to Treatment  Reduction in symptoms as reported by patient     Prognosis: Good     Progress towards Goals: Aggravation of symptoms noted 5/5/2022  Patient will report improved pain by 50%.              Patient will report able to sleep >6 hours.              Patient will report able to drive with 2 less points on the neck disability index.              Patient will demonstrate an improved ability to complete Activities of Daily Living   as shown by a reported 10-30% reduced score on neck and/or back index.               Patient will demonstrate improved ROM.     Recommendations:     Instructions:ice 20 minutes every other hour as needed and heat 15 minutes every other hour as needed     Follow-up:  Continue treatment PRN.

## 2022-06-13 NOTE — PROGRESS NOTES
[facility]    Outpatient Physical Therapy Discharge Note  Patient: Adin Madrid  : 1960    Beginning/End Dates of Reporting Period:  3/7/2022 to 2022    Referring Provider: Juan Rojas MD    Therapy Diagnosis: Impaired mobility, dcereased strength and endurance, neck and low back pain     Client Self Report: Was sore after last visit but two days later it was the best day he has had in quite some time. Overall, pleased with how he felt after last visit. 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    Target Date 22   Date Met      Progress (detail required for progress note):       Goal Identifier Overhead   Goal Description Patient will be able to complete overhead work for 5-10 minutes with pain no greater than 4/10 consistently in order to improve his overall mobility at home   Target Date 22   Date Met      Progress (detail required for progress note):       Goal Identifier Housework   Goal Description Patient will be able to complete housework, such as dishes, brushcutting, and laundry, with pain no no greater than 4/10 consistently in order to improve his overall mobility and function at home   Target Date 22   Date Met      Progress (detail required for progress note):       Goal Identifier Walking   Goal Description Patient will be able to walk for longer than 15 minutes with pain no greater than 3/10 consistently in order to improve his overall mobility around the community   Target Date 22   Date Met      Progress (detail  required for progress note):       Unable to assess progress towards goals as discharge is unplanned.     Plan:  Discharge from therapy.    Discharge:    Reason for Discharge: No further visits were scheduled    Equipment Issued: none    Discharge Plan: Follow up with physician as needed.

## 2022-06-17 ENCOUNTER — MYC MEDICAL ADVICE (OUTPATIENT)
Dept: FAMILY MEDICINE | Facility: OTHER | Age: 62
End: 2022-06-17

## 2022-06-17 DIAGNOSIS — G47.30 SLEEP APNEA, UNSPECIFIED TYPE: Primary | ICD-10-CM

## 2022-06-20 ENCOUNTER — THERAPY VISIT (OUTPATIENT)
Dept: CHIROPRACTIC MEDICINE | Facility: OTHER | Age: 62
End: 2022-06-20
Attending: CHIROPRACTOR
Payer: COMMERCIAL

## 2022-06-20 VITALS
HEART RATE: 92 BPM | OXYGEN SATURATION: 98 % | RESPIRATION RATE: 18 BRPM | DIASTOLIC BLOOD PRESSURE: 88 MMHG | SYSTOLIC BLOOD PRESSURE: 140 MMHG | TEMPERATURE: 98.4 F

## 2022-06-20 DIAGNOSIS — M54.50 CHRONIC LOW BACK PAIN, UNSPECIFIED BACK PAIN LATERALITY, UNSPECIFIED WHETHER SCIATICA PRESENT: ICD-10-CM

## 2022-06-20 DIAGNOSIS — M62.830 BACK MUSCLE SPASM: ICD-10-CM

## 2022-06-20 DIAGNOSIS — M54.2 CHRONIC NECK PAIN: ICD-10-CM

## 2022-06-20 DIAGNOSIS — M99.02 SEGMENTAL AND SOMATIC DYSFUNCTION OF THORACIC REGION: ICD-10-CM

## 2022-06-20 DIAGNOSIS — M99.01 SEGMENTAL AND SOMATIC DYSFUNCTION OF CERVICAL REGION: Primary | ICD-10-CM

## 2022-06-20 DIAGNOSIS — M62.838 NECK MUSCLE SPASM: ICD-10-CM

## 2022-06-20 DIAGNOSIS — M54.6 PAIN IN THORACIC SPINE: ICD-10-CM

## 2022-06-20 DIAGNOSIS — M99.04 SEGMENTAL AND SOMATIC DYSFUNCTION OF SACRAL REGION: ICD-10-CM

## 2022-06-20 DIAGNOSIS — G89.29 CHRONIC NECK PAIN: ICD-10-CM

## 2022-06-20 DIAGNOSIS — G89.29 CHRONIC LOW BACK PAIN, UNSPECIFIED BACK PAIN LATERALITY, UNSPECIFIED WHETHER SCIATICA PRESENT: ICD-10-CM

## 2022-06-20 PROCEDURE — 98941 CHIROPRACT MANJ 3-4 REGIONS: CPT | Mod: AT | Performed by: CHIROPRACTOR

## 2022-06-20 PROCEDURE — 97810 ACUP 1/> WO ESTIM 1ST 15 MIN: CPT | Performed by: CHIROPRACTOR

## 2022-06-20 PROCEDURE — 99212 OFFICE O/P EST SF 10 MIN: CPT | Mod: 25 | Performed by: CHIROPRACTOR

## 2022-06-20 NOTE — PROGRESS NOTES
Right neck is constant sore and tight radiating into right shoulder. Rates 6/10 W24 8/10. Using ice and ultra sound it helps a lot.  Right lower back is constant burning and tight. Rates 6/10 W24 8/10.Using ice and ultra sound it helps a lot.   Arely De Jesus on 6/20/2022 at 2:34 PM    Reviewed by EW    Visit #:  8 PRN  Re-assessment    Subjective:  Adin Madrid is a 62 year old male who is seen in f/u up for:        Segmental and somatic dysfunction of cervical region  Chronic neck pain  Segmental and somatic dysfunction of sacral region  Chronic low back pain, unspecified back pain laterality, unspecified whether sciatica present  Segmental and somatic dysfunction of thoracic region  Pain in thoracic spine  Back muscle spasm  Neck muscle spasm.     Since last visit on 5/26/2022,  Adin Madrid reports: Following last encounter symptoms showed temporary improvement.  Patient notes that work is extremely busy.  Patient works physical/laborious job.  Notes that he oftentimes is sitting in a twisted/rotated position.  Attributes this to reactivation of symptoms.  Notes that symptoms were most flared starting last week.  Attempted to contact her office for evaluation and treatment but was unable to do as provider was not available.  Patient does have home care measures including but not limited to ice, and ultrasound.  Notes that this has been helping symptoms quite a bit.  Symptoms have not fully resolved patient presents for chiropractic treatment and acupuncture.    Has been experiencing left arm numbness, this has been present on last encounter.  No reported weakness of the upper or lower extremities.  No radicular complaints of the lower extremities, loss of bowel or bladder or saddle paresthesia present today.    (DVPRS) Pain Rating Score : Hard to ignore, avoid usual activities (W24 8/10) (06/20/22 7146)     Objective:  The following was observed:  BP (!) 140/88 (BP Location: Right arm, Patient Position: Sitting,  Cuff Size: Adult Regular)   Pulse 92   Temp 98.4  F (36.9  C) (Tympanic)   Resp 18   SpO2 98%    Neck Disability Index (  Ahmet H. and Varun LIZ. 1991. All rights reserved.; used with permission) 6/20/2022   SECTION 1 - PAIN INTENSITY 3   SECTION 2 - PERSONAL CARE 1   SECTION 3 - LIFTING 1   SECTION 4 - READING 4   SECTION 5 - HEADACHES 3   SECTION 6 - CONCENTRATION 2   SECTION 7 - WORK 3   SECTION 8 - DRIVING 4   SECTION 9 - SLEEPING 3   SECTION 10 - RECREATION 3   Count 10   Sum 27   Raw Score: /50 27   Neck Disability Index Score: (%) 54     Slight increase from 52%    Cervical AROM: Moderate restriction with all active ranges of motion    Cervical compression:-  Cervical distraction:+    Oswestry (JOSELYN) Questionnaire    OSWESTRY DISABILITY INDEX 6/20/2022   Count 9   Sum 20   Oswestry Score (%) 44.44   Some recent data might be hidden     Improved from 53.33%      Thoracic/lumbar AROM: Flexion WNL, extension restriction moderate/severe, rotation restriction moderate bilaterally, lateral flexion restriction moderate bilaterally    SLR: + bilaterally  Ely's: -right, +left    Kenneth STarT back: 4 sub score, 8 total score    P: palpatory tendernessRight side C1, left side C4, T3 bilateral, T9 bilateral, left PSIS:    A: static palpation demonstrates intersegmental asymmetry , cervical, thoracic, pelvis  R: motion palpation notes restricted motion, C1 , C4 , T3 , T9  and Sacrum   T: muscle spasm at level(s): Moderate/severe spasms upper trapezius, levator scapula bilaterally, lumbar paraspinals bilaterally and quadratus lumborum left greater than right.  Mild spasms paraspinal musculature thoracic and cervical regions bilaterally, moderate spasms right suboccipital musculature, right splenius capitis:      Segmental spinal dysfunction/restrictions found at:  :  C1 Left rotation restricted and Right lateral flexion restricted  C4 Left lateral flexion restricted and Extension restriction  T3 Extension restriction  T9  Extension restriction  Sacrum Left lateral flexion restricted and Extension restriction.      Assessment: Acute reaggravation of symptoms most likely from job-related tasks.  Patient has been under our care with beneficial results and anticipate that to be the case at this time.  Patient notes that work schedule is quite demanding and at this time it is quite difficult to plan for follow-up visits.  Because of this we will continue to follow-up with patient on as-needed basis for flareup care for chiropractic adjustments and acupuncture treatment as these seem to provide patient with adequate levels of benefit.    Reassessment performed on today's visit for insurance purposes.    Diagnoses:      1. Segmental and somatic dysfunction of cervical region    2. Chronic neck pain    3. Segmental and somatic dysfunction of sacral region    4. Chronic low back pain, unspecified back pain laterality, unspecified whether sciatica present    5. Segmental and somatic dysfunction of thoracic region    6. Pain in thoracic spine    7. Back muscle spasm    8. Neck muscle spasm        Patient's condition:  Patient had restrictions pre-manipulation and Patient symptoms are worsed due to work.    Treatment effectiveness:  Post manipulation there is better intersegmental movement and Patient claims to feel looser post manipulation      Procedures:  E/M [id number]    CMT:  90337 Chiropractic manipulative treatment 3-4 regions performed   Cervical: Diversified, C1 , C4, Supine  Thoracic: Diversified, T3, T9, Prone  Pelvis: Diversified, Sacrum , Side posture    Modalities:  44562: Acupuncture, for 15 minutes:  Points: Bl 10, 11, 12, 13, 14, 15, 16, 17, 18, 19, 20, 21, 22, 23, 24, 25, 26 46, 47, SI 9, 11, 13, 15,GB 20 TW9, 12, 13  For 15 minutes    Therapeutic procedures:  None    Response to Treatment  Reduction in symptoms as reported by patient    Prognosis: Good    Progress towards Goals: acute aggravation 6/20/2022  Patient will report  improved pain by 50%.              Patient will report able to sleep >6 hours.              Patient will report able to drive with 2 less points on the neck disability index.              Patient will demonstrate an improved ability to complete Activities of Daily Living   as shown by a reported 10-30% reduced score on neck and/or back index.               Patient will demonstrate improved ROM.    Recommendations:    Instructions:ice 20 minutes every other hour as needed and heat 15 minutes every other hour as needed    Follow-up:  Continue treatment PRN.

## 2022-08-10 ENCOUNTER — THERAPY VISIT (OUTPATIENT)
Dept: CHIROPRACTIC MEDICINE | Facility: OTHER | Age: 62
End: 2022-08-10
Attending: CHIROPRACTOR
Payer: COMMERCIAL

## 2022-08-10 VITALS
TEMPERATURE: 97.5 F | HEART RATE: 66 BPM | SYSTOLIC BLOOD PRESSURE: 136 MMHG | OXYGEN SATURATION: 98 % | DIASTOLIC BLOOD PRESSURE: 82 MMHG | RESPIRATION RATE: 16 BRPM

## 2022-08-10 DIAGNOSIS — G89.29 CHRONIC NECK PAIN: ICD-10-CM

## 2022-08-10 DIAGNOSIS — M99.04 SEGMENTAL AND SOMATIC DYSFUNCTION OF SACRAL REGION: ICD-10-CM

## 2022-08-10 DIAGNOSIS — M62.830 BACK MUSCLE SPASM: ICD-10-CM

## 2022-08-10 DIAGNOSIS — M99.01 SEGMENTAL AND SOMATIC DYSFUNCTION OF CERVICAL REGION: Primary | ICD-10-CM

## 2022-08-10 DIAGNOSIS — R20.2 NUMBNESS AND TINGLING IN LEFT HAND: ICD-10-CM

## 2022-08-10 DIAGNOSIS — M62.838 NECK MUSCLE SPASM: ICD-10-CM

## 2022-08-10 DIAGNOSIS — M54.41 LEFT-SIDED LOW BACK PAIN WITH RIGHT-SIDED SCIATICA, UNSPECIFIED CHRONICITY: ICD-10-CM

## 2022-08-10 DIAGNOSIS — R20.0 NUMBNESS AND TINGLING IN LEFT HAND: ICD-10-CM

## 2022-08-10 DIAGNOSIS — M54.2 CHRONIC NECK PAIN: ICD-10-CM

## 2022-08-10 DIAGNOSIS — M54.6 PAIN IN THORACIC SPINE: ICD-10-CM

## 2022-08-10 DIAGNOSIS — M99.02 SEGMENTAL AND SOMATIC DYSFUNCTION OF THORACIC REGION: ICD-10-CM

## 2022-08-10 PROCEDURE — 97810 ACUP 1/> WO ESTIM 1ST 15 MIN: CPT | Performed by: CHIROPRACTOR

## 2022-08-10 PROCEDURE — 98941 CHIROPRACT MANJ 3-4 REGIONS: CPT | Mod: AT | Performed by: CHIROPRACTOR

## 2022-08-10 NOTE — PROGRESS NOTES
Neck is constant aching with intermittent stabbing. Left hand is intermittent tingling. 6/10 W24 8/10. Using U/S, ice, and, stretches with decrease in pain. Bilateral lower back is constantly locking. Radiated down right leg to back of knee yesterday. 7/10 W24 9/10. Using, ice and stretches with decrease in pain.  Юлия Van on 8/10/2022 at 2:06 PM    Reviewed by EW    Visit #:  9 PRN    Subjective:  Adin Madrid is a 62 year old male who is seen in f/u up for:        Segmental and somatic dysfunction of cervical region  Chronic neck pain  Segmental and somatic dysfunction of sacral region  Left-sided low back pain with right-sided sciatica, unspecified chronicity  Segmental and somatic dysfunction of thoracic region  Pain in thoracic spine  Back muscle spasm  Neck muscle spasm  Numbness and tingling in left hand.     Since last visit on 6/20/2022,  Adin Madrid reports: Began experiencing reactivation of symptoms as listed above which she attributes to job-related duties.  Patient works a very physically demanding/laborious job and notes that it is been a little more busy recently due to to an upcoming open house.  No specific traumatic events.  Did have some radiating symptoms into the right leg but no reported loss of bowel or bladder or saddle paresthesia.  Inquired about numbness and tingling of his hands.  Notices this with driving, or curling up at night to sleep.  Numbness and tingling of the hands not always present, denies any strength changes of the upper extremities.  The symptoms do not seem to be worsening at this time        (DVPRS) Pain Rating Score : Focus of attention, prevents doing daily activities (W24 9/10) (08/10/22 1400)     Objective:  The following was observed:  /82 (BP Location: Right arm, Patient Position: Sitting, Cuff Size: Adult Regular)   Pulse 66   Temp 97.5  F (36.4  C) (Tympanic)   Resp 16   SpO2 98%    Neck Disability Index (  Ahmet ENGLISH. and Varun LIZ. 1991. All rights  reserved.; used with permission) 8/10/2022   SECTION 1 - PAIN INTENSITY 2   SECTION 2 - PERSONAL CARE 1   SECTION 3 - LIFTING 1   SECTION 4 - READING 4   SECTION 5 - HEADACHES 2   SECTION 6 - CONCENTRATION 0   SECTION 7 - WORK 3   SECTION 8 - DRIVING 2   SECTION 9 - SLEEPING 3   SECTION 10 - RECREATION 3   Count 10   Sum 21   Raw Score: /50 21   Neck Disability Index Score: (%) 42     Improved from 54%    Oswestry (JOSELYN) Questionnaire    OSWESTRY DISABILITY INDEX 8/10/2022   Count 9   Sum 16   Oswestry Score (%) 35.56   Some recent data might be hidden      Improved from 44.44%    P: palpatory tendernessRight side C1, left side C4, T3 bilateral, T9 bilateral, left PSIS:    A: static palpation demonstrates intersegmental asymmetry , cervical, thoracic, pelvis  R: motion palpation notes restricted motion, C1 , C4 , T3 , T9  and Sacrum   T: muscle spasm at level(s): Moderate/severe spasms upper trapezius, levator scapula bilaterally, lumbar paraspinals bilaterally and quadratus lumborum left greater than right.  Mild spasms paraspinal musculature thoracic and cervical regions bilaterally, moderate spasms right suboccipital musculature, right splenius capitis:       Segmental spinal dysfunction/restrictions found at:  :  C1 Left rotation restricted and Right lateral flexion restricted  C4 Left lateral flexion restricted and Extension restriction  T3 Extension restriction  T9 Extension restriction  Sacrum Left lateral flexion restricted and Extension restriction.      Assessment: Aggravation of chronic neck and back pain symptoms.  Patient has been treated by our office with the symptoms and typically responds favorably to chiropractic adjusting along with acupuncture treatments.  Patient has gone through course of physical therapy and does have home ultrasound equipment.  When symptoms are bad enough patient presents for treatment.  Numbness and tingling of the hands most likely caused from compression of the brachial  plexus resulting in paresthetic changes of the upper extremity.  Instructed patient on warning signs such as dropping of objects or loss of fine motor function.  Discussed nerve glide/flossing exercises which also may provide some level of help in addition to chiropractic care and acupuncture.    Due to patient's work schedule we will plan to follow-up with patient on as-needed basis.    Diagnoses:      1. Segmental and somatic dysfunction of cervical region    2. Chronic neck pain    3. Segmental and somatic dysfunction of sacral region    4. Left-sided low back pain with right-sided sciatica, unspecified chronicity    5. Segmental and somatic dysfunction of thoracic region    6. Pain in thoracic spine    7. Back muscle spasm    8. Neck muscle spasm    9. Numbness and tingling in left hand        Patient's condition:  Patient had restrictions pre-manipulation    Treatment effectiveness:  Post manipulation there is better intersegmental movement and Patient claims to feel looser post manipulation      Procedures:  CMT:  56368 Chiropractic manipulative treatment 3-4 regions performed   Cervical: Diversified, C1 , C4, Supine  Thoracic: Diversified, T3, T9, Prone  Pelvis: Diversified, Sacrum , Side posture     Modalities:  79036: Acupuncture, for 15 minutes:  Points: Bl 10, 11, 12, 13, 14, 15, 16, 17, 18, 19, 20, 21, 22, 23, 24, 25, 26 46, 47, SI 9, 11, 13, 15,GB 20 TW9, 12, 13  For 15 minutes     Therapeutic procedures:  None     Response to Treatment  Reduction in symptoms as reported by patient     Prognosis: Good     Progress towards Goals: in progress  Patient will report improved pain by 50%.              Patient will report able to sleep >6 hours.              Patient will report able to drive with 2 less points on the neck disability index.              Patient will demonstrate an improved ability to complete Activities of Daily Living   as shown by a reported 10-30% reduced score on neck and/or back index.                Patient will demonstrate improved ROM.     Recommendations:     Instructions:ice 20 minutes every other hour as needed and heat 15 minutes every other hour as needed     Follow-up:  Continue treatment PRN.

## 2022-08-15 ENCOUNTER — MYC MEDICAL ADVICE (OUTPATIENT)
Dept: FAMILY MEDICINE | Facility: OTHER | Age: 62
End: 2022-08-15

## 2022-08-15 DIAGNOSIS — G47.33 OBSTRUCTIVE SLEEP APNEA SYNDROME: Primary | ICD-10-CM

## 2022-08-15 NOTE — TELEPHONE ENCOUNTER
Prescription in my box.  I know we did this before, don't remember when.  Juan Rojas MD on 8/15/2022 at 3:41 PM

## 2022-08-15 NOTE — TELEPHONE ENCOUNTER
I was on vacation when this was done so I do not know why they have not received it. Can you do an Rx for another one?  Chaya Pimentel LPN..................8/15/2022   2:02 PM

## 2022-08-16 NOTE — TELEPHONE ENCOUNTER
I spoke to the patient. He will get a fax number to send it to.  Chaya Pimentel LPN..................8/16/2022   10:44 AM

## 2022-08-18 ENCOUNTER — MYC MEDICAL ADVICE (OUTPATIENT)
Dept: FAMILY MEDICINE | Facility: OTHER | Age: 62
End: 2022-08-18

## 2022-08-18 NOTE — TELEPHONE ENCOUNTER
I got the fax number from the patient and the Rx was faxed.  Chaya Pimentel LPN..................8/18/2022   6:04 PM       Quality 431: Preventive Care And Screening: Unhealthy Alcohol Use - Screening: Patient screened for unhealthy alcohol use using a single question and scores less than 2 times per year Quality 110: Preventive Care And Screening: Influenza Immunization: Influenza Immunization Ordered or Recommended, but not Administered due to system reason Detail Level: Detailed Quality 226: Preventive Care And Screening: Tobacco Use: Screening And Cessation Intervention: Patient screened for tobacco use and is an ex/non-smoker Quality 111:Pneumonia Vaccination Status For Older Adults: Pneumococcal Vaccination not Administered or Previously Received, Reason not Otherwise Specified Quality 431: Preventive Care And Screening: Unhealthy Alcohol Use - Screening: Patient not identified as an unhealthy alcohol user when screened for unhealthy alcohol use using a systematic screening method

## 2022-09-01 ENCOUNTER — THERAPY VISIT (OUTPATIENT)
Dept: CHIROPRACTIC MEDICINE | Facility: OTHER | Age: 62
End: 2022-09-01
Attending: CHIROPRACTOR
Payer: COMMERCIAL

## 2022-09-01 VITALS — TEMPERATURE: 97.7 F | RESPIRATION RATE: 16 BRPM | HEART RATE: 79 BPM | OXYGEN SATURATION: 97 %

## 2022-09-01 DIAGNOSIS — M99.02 SEGMENTAL AND SOMATIC DYSFUNCTION OF THORACIC REGION: ICD-10-CM

## 2022-09-01 DIAGNOSIS — G89.29 CHRONIC LOW BACK PAIN, UNSPECIFIED BACK PAIN LATERALITY, UNSPECIFIED WHETHER SCIATICA PRESENT: Primary | ICD-10-CM

## 2022-09-01 DIAGNOSIS — M54.50 CHRONIC LOW BACK PAIN, UNSPECIFIED BACK PAIN LATERALITY, UNSPECIFIED WHETHER SCIATICA PRESENT: Primary | ICD-10-CM

## 2022-09-01 DIAGNOSIS — G44.209 TENSION-TYPE HEADACHE, NOT INTRACTABLE, UNSPECIFIED CHRONICITY PATTERN: ICD-10-CM

## 2022-09-01 DIAGNOSIS — M50.30 DEGENERATION OF CERVICAL INTERVERTEBRAL DISC: ICD-10-CM

## 2022-09-01 DIAGNOSIS — M99.01 SEGMENTAL AND SOMATIC DYSFUNCTION OF CERVICAL REGION: ICD-10-CM

## 2022-09-01 DIAGNOSIS — M99.03 SEGMENTAL AND SOMATIC DYSFUNCTION OF LUMBAR REGION: ICD-10-CM

## 2022-09-01 PROCEDURE — 97810 ACUP 1/> WO ESTIM 1ST 15 MIN: CPT | Performed by: CHIROPRACTOR

## 2022-09-01 PROCEDURE — 98941 CHIROPRACT MANJ 3-4 REGIONS: CPT | Mod: AT | Performed by: CHIROPRACTOR

## 2022-09-01 NOTE — PROGRESS NOTES
Left lower back is constantly dull. 6/10 W24 8/10. Using Ultrasound, cold, and stretches. These provide a decrease in pain and giving an increase in ROM.  Юлия Van on 9/1/2022 at 1:25 PM    Reviewed by EW    Visit #:  10 PRN    Subjective:  Adin Madrid is a 62 year old male who is seen in f/u up for:        Chronic low back pain, unspecified back pain laterality, unspecified whether sciatica present  Segmental and somatic dysfunction of lumbar region  Segmental and somatic dysfunction of thoracic region  Segmental and somatic dysfunction of cervical region  Tension-type headache, not intractable, unspecified chronicity pattern  Degeneration of cervical intervertebral disc.     Since last visit on 8/10/2022,  Adin Madrid reports: Currently dealing with left lower back pain.  No specific causation such as traumatic events.  Patient does work physically demanding job which seems to be a major contributor to exacerbation of neck and back concerns.  No reported radicular complaints of the upper or lower extremities however patient has been experiencing a headache for approximately 3 days.    Patient has home ultrasound unit, exact details are not specified at this time.  Patient finds quite a bit of relief with this stating that he uses it 1-2 times daily for approximately 10 minutes per session.  Patient does inquire about potential long-term side effects or any risks associated with using this device.      Patient also concerned about long-term prognosis regarding back and neck concerns.  Patient originally was hoping to be able to return to exercise but at this time he feels this is not a good idea.      (DVPRS) Pain Rating Score : Hard to ignore, avoid usual activities (W24 8/10) (09/01/22 1322)     Objective:  The following was observed:  Pulse 79   Temp 97.7  F (36.5  C) (Tympanic)   Resp 16   SpO2 97%      P: palpatory tendernessLeft side C2, left side L5:    A: static palpation demonstrates  intersegmental asymmetry , cervical, thoracic, lumbar  R: motion palpation notes restricted motion, C2 , T3  and L5   T: muscle spasm at level(s): Suboccipitals bilaterally, cervical paraspinals bilaterally, quadratus lumborum and lumbar paraspinals bilaterally, hypertonicity noted along the upper trapezius and thoracic paraspinals both bilaterally:      Segmental spinal dysfunction/restrictions found at:  :  C2 Left lateral flexion restricted and Extension restriction  T3 Left lateral flexion restricted and Extension restriction  L5 Right rotation restricted, Left lateral flexion restricted and Extension restriction.      Assessment: Segmental/somatic dysfunction and subsequent muscular spasms present.  Patient has responded well with occasional chiropractic visits and acupuncture treatments for flareup care.  Continue to follow with patient on as-needed basis for these concerns.    Long-term prognosis: Discussed that patient will likely be dealing with the symptoms for the remainder of his life.  Discussed possibility of returning to physical therapy for spine therapy program, patient does not seem interested at this time however we will continue to progress up to the patient as this may be beneficial.  Also discussed use of battlefield acupuncture to help with pain management.  Patient does seem interested in this treatment as well.    Discussed possible side effects and concerns regarding ultrasound.  Although not recommended I am not aware of any ill effects of long-term/chronic use of ultrasound.  Discussed importance of moving ultrasound head to avoid burning of the subsequent tissue that periosteal reaction or tooth muscle or skin.  We will continue to research this for the patient.    Diagnoses:      1. Chronic low back pain, unspecified back pain laterality, unspecified whether sciatica present    2. Segmental and somatic dysfunction of lumbar region    3. Segmental and somatic dysfunction of thoracic  region    4. Segmental and somatic dysfunction of cervical region    5. Tension-type headache, not intractable, unspecified chronicity pattern    6. Degeneration of cervical intervertebral disc        Patient's condition:  Patient had restrictions pre-manipulation    Treatment effectiveness:  Post manipulation there is better intersegmental movement and Patient claims to feel looser post manipulation      Procedures:  CMT:  98895 Chiropractic manipulative treatment 3-4 regions performed   Cervical: Diversified, C2, Supine  Thoracic: Diversified, T3, Prone  Lumbar: Diversified, L5, Side posture    Modalities:  83667: Acupuncture, for 15 minutes:  Points: Bl 10, 23, 24, 25, 26, 46, 47, GB 20, GV 9, 10, 11, 12, 21, TW 21  For 15 minutes    Therapeutic procedures:  None    Response to Treatment  Reduction in symptoms as reported by patient    Prognosis: Good    Progress towards Goals: Reduce pain to improve function    Recommendations:    Instructions:Monitor symptoms and perform activities as tolerated.    Follow-up:  Continue treatment PRN.

## 2022-09-17 ENCOUNTER — HEALTH MAINTENANCE LETTER (OUTPATIENT)
Age: 62
End: 2022-09-17

## 2022-10-18 ENCOUNTER — MYC MEDICAL ADVICE (OUTPATIENT)
Dept: FAMILY MEDICINE | Facility: OTHER | Age: 62
End: 2022-10-18

## 2022-12-03 DIAGNOSIS — G89.29 CHRONIC NECK PAIN: ICD-10-CM

## 2022-12-03 DIAGNOSIS — M54.2 CHRONIC NECK PAIN: ICD-10-CM

## 2022-12-05 NOTE — TELEPHONE ENCOUNTER
Routing refill request to provider for review/approval because:    LOV: 3/1/22    Sapna Maynard RN on 12/5/2022 at 1:28 PM

## 2022-12-06 RX ORDER — ETODOLAC 500 MG
TABLET ORAL
Qty: 180 TABLET | Refills: 3 | Status: SHIPPED | OUTPATIENT
Start: 2022-12-06 | End: 2023-04-13

## 2022-12-13 ENCOUNTER — MEDICAL CORRESPONDENCE (OUTPATIENT)
Dept: HEALTH INFORMATION MANAGEMENT | Facility: OTHER | Age: 62
End: 2022-12-13

## 2023-01-19 ENCOUNTER — THERAPY VISIT (OUTPATIENT)
Dept: CHIROPRACTIC MEDICINE | Facility: OTHER | Age: 63
End: 2023-01-19
Attending: CHIROPRACTOR
Payer: COMMERCIAL

## 2023-01-19 VITALS — RESPIRATION RATE: 18 BRPM | TEMPERATURE: 97.1 F | HEART RATE: 71 BPM | OXYGEN SATURATION: 97 %

## 2023-01-19 DIAGNOSIS — M50.30 DEGENERATION OF CERVICAL INTERVERTEBRAL DISC: ICD-10-CM

## 2023-01-19 DIAGNOSIS — M99.02 SEGMENTAL AND SOMATIC DYSFUNCTION OF THORACIC REGION: ICD-10-CM

## 2023-01-19 DIAGNOSIS — M54.41 CHRONIC LOW BACK PAIN WITH RIGHT-SIDED SCIATICA, UNSPECIFIED BACK PAIN LATERALITY: ICD-10-CM

## 2023-01-19 DIAGNOSIS — M62.830 BACK MUSCLE SPASM: ICD-10-CM

## 2023-01-19 DIAGNOSIS — G44.209 TENSION-TYPE HEADACHE, NOT INTRACTABLE, UNSPECIFIED CHRONICITY PATTERN: Primary | ICD-10-CM

## 2023-01-19 DIAGNOSIS — M99.01 SEGMENTAL AND SOMATIC DYSFUNCTION OF CERVICAL REGION: ICD-10-CM

## 2023-01-19 DIAGNOSIS — M62.838 NECK MUSCLE SPASM: ICD-10-CM

## 2023-01-19 DIAGNOSIS — G89.29 CHRONIC LOW BACK PAIN WITH RIGHT-SIDED SCIATICA, UNSPECIFIED BACK PAIN LATERALITY: ICD-10-CM

## 2023-01-19 DIAGNOSIS — M99.03 SEGMENTAL AND SOMATIC DYSFUNCTION OF LUMBAR REGION: ICD-10-CM

## 2023-01-19 PROCEDURE — 98941 CHIROPRACT MANJ 3-4 REGIONS: CPT | Mod: AT | Performed by: CHIROPRACTOR

## 2023-01-19 PROCEDURE — 97810 ACUP 1/> WO ESTIM 1ST 15 MIN: CPT | Performed by: CHIROPRACTOR

## 2023-01-19 PROCEDURE — 99212 OFFICE O/P EST SF 10 MIN: CPT | Mod: 25 | Performed by: CHIROPRACTOR

## 2023-01-19 NOTE — PROGRESS NOTES
Neck is constantly stabbing and locking up. Severe headaches for 3 weeks. 6/10 W24 8/10. Using heat, and U/S which are helping with the neck discomfort but not so much the head.  Right side lower back is occasionally burning. Radiating down right leg. 6/10 W24 8/10. Using heat, medications, and U/S which are helping to loosen it up.   Юлия Prabhakarradha on 1/19/2023 at 10:13 AM    Reviewed by EW    Visit #:  1  Re-eval    Subjective:  Adin Madrid is a 62 year old male who is seen in f/u up for:        Tension-type headache, not intractable, unspecified chronicity pattern  Segmental and somatic dysfunction of cervical region  Degeneration of cervical intervertebral disc  Segmental and somatic dysfunction of thoracic region  Segmental and somatic dysfunction of lumbar region  Chronic low back pain with right-sided sciatica, unspecified back pain laterality  Neck muscle spasm  Back muscle spasm.     Since last visit on 9/1/2022,  Adin Madrid reports: Has been experiencing headaches over the past 3 weeks.  Continues to try manage symptoms on his own using primarily ultrasound, heat medication.  Provide some level of help however symptoms continue to return.  No traumatic events.  Patient very busy at work which is physical.  Patient is having radicular complaints into the right leg.  No apparent weakness of the upper or lower extremities, denies any loss of bowel or bladder function or saddle paresthesia.      (DVPRS) Pain Rating Score : Hard to ignore, avoid usual activities (W24 8/10) (01/19/23 1010)     Objective:  The following was observed:  Pulse 71   Temp 97.1  F (36.2  C) (Tympanic)   Resp 18   SpO2 97%    Neck Disability Index (  Ahmet H. and Varun C. 1991. All rights reserved.; used with permission) 1/19/2023   SECTION 1 - PAIN INTENSITY 2   SECTION 2 - PERSONAL CARE 2   SECTION 3 - LIFTING 2   SECTION 4 - READING 4   SECTION 5 - HEADACHES 5   SECTION 6 - CONCENTRATION 4   SECTION 7 - WORK 2   SECTION 8 -  DRIVING 2   SECTION 9 - SLEEPING 3   SECTION 10 - RECREATION 3   Count 10   Sum 29   Raw Score: /50 29   Neck Disability Index Score: (%) 58     Cervical AROM: Moderate restriction with flexion and extension, rotation restriction greater left than right    Cervical compression:-  Cervical distraction:-    Oswestry (JOSELYN) Questionnaire    OSWESTRY DISABILITY INDEX 1/19/2023   Count 9   Sum 21   Oswestry Score (%) 46.67   Some recent data might be hidden      Thoracic/lumbar AROM: Restrictions with extension and right rotation    SLR:+right, -left  Iliac compression: negative    P: palpatory tenderness none specifically reported by the patient:    A: static palpation demonstrates intersegmental asymmetry , cervical, thoracic, lumbar  R: motion palpation notes restricted motion, C1 , C2 , T5 , T10 and L5   T: Marked spasms suboccipitals bilaterally, cervical paraspinals bilaterally, moderate spasms thoracic paraspinals bilaterally, quadratus lumborum and lumbar paraspinals bilaterally    Segmental spinal dysfunction/restrictions found at:  :  C1 Left rotation restricted and Right lateral flexion restricted  C2 Left lateral flexion restricted and Extension restriction  T5 Left lateral flexion restricted and Extension restriction  T10 Right lateral flexion restricted and Extension restriction  L5 Left lateral flexion restricted and Extension restriction.      Assessment: Exacerbation chronic neck and back concerns.  Patient has been under our care in the past with similar complaints and responded quite favorably with chiropractic adjustments along with acupuncture.  Patient's work schedule does make it difficult to plan follow-up care.  At this time plan to follow-up with patient on as-needed basis.    Diagnoses:      1. Tension-type headache, not intractable, unspecified chronicity pattern    2. Segmental and somatic dysfunction of cervical region    3. Degeneration of cervical intervertebral disc    4. Segmental and  somatic dysfunction of thoracic region    5. Segmental and somatic dysfunction of lumbar region    6. Chronic low back pain with right-sided sciatica, unspecified back pain laterality    7. Neck muscle spasm    8. Back muscle spasm        Patient's condition:  Patient had restrictions pre-manipulation    Treatment effectiveness:  Post manipulation there is better intersegmental movement and Patient claims to feel looser post manipulation      Procedures:  E/M 10960    CMT:  [id number] Chiropractic manipulative treatment 3-4 regions performed   Cervical: Diversified, C1 , C2, Supine  Thoracic: Diversified, T5, T10, Prone  Lumbar: Diversified, L5, Side posture    Modalities:  65070: Acupuncture, for 15 minutes:  Points: Bl 10, [place] points along the thoracic spine, Bl 22, 23, 24, 25, 46, 47, GB 20, TW 21  For 15 minutes    Therapeutic procedures:  None    Response to Treatment  Reduction in symptoms as reported by patient    Prognosis: Good    Progress towards Goals: Reduce pain symptoms 20%  Improve cervical spine AROM  Improve disability index scores 20-30%    Recommendations:    Instructions:Continue to manage symptoms as tolerated with home care measures.      Follow-up:  Return to care if symptoms persist.

## 2023-02-13 ENCOUNTER — THERAPY VISIT (OUTPATIENT)
Dept: CHIROPRACTIC MEDICINE | Facility: OTHER | Age: 63
End: 2023-02-13
Attending: CHIROPRACTOR
Payer: COMMERCIAL

## 2023-02-13 VITALS — TEMPERATURE: 97 F | OXYGEN SATURATION: 94 % | RESPIRATION RATE: 18 BRPM | HEART RATE: 115 BPM

## 2023-02-13 DIAGNOSIS — M62.830 BACK MUSCLE SPASM: ICD-10-CM

## 2023-02-13 DIAGNOSIS — G44.209 TENSION-TYPE HEADACHE, NOT INTRACTABLE, UNSPECIFIED CHRONICITY PATTERN: ICD-10-CM

## 2023-02-13 DIAGNOSIS — M99.04 SEGMENTAL AND SOMATIC DYSFUNCTION OF SACRAL REGION: ICD-10-CM

## 2023-02-13 DIAGNOSIS — M50.30 DEGENERATION OF CERVICAL INTERVERTEBRAL DISC: ICD-10-CM

## 2023-02-13 DIAGNOSIS — G89.29 CHRONIC LOW BACK PAIN, UNSPECIFIED BACK PAIN LATERALITY, UNSPECIFIED WHETHER SCIATICA PRESENT: ICD-10-CM

## 2023-02-13 DIAGNOSIS — M54.50 CHRONIC LOW BACK PAIN, UNSPECIFIED BACK PAIN LATERALITY, UNSPECIFIED WHETHER SCIATICA PRESENT: ICD-10-CM

## 2023-02-13 DIAGNOSIS — M62.838 NECK MUSCLE SPASM: ICD-10-CM

## 2023-02-13 DIAGNOSIS — M99.02 SEGMENTAL AND SOMATIC DYSFUNCTION OF THORACIC REGION: ICD-10-CM

## 2023-02-13 DIAGNOSIS — M99.01 SEGMENTAL AND SOMATIC DYSFUNCTION OF CERVICAL REGION: Primary | ICD-10-CM

## 2023-02-13 PROCEDURE — 97810 ACUP 1/> WO ESTIM 1ST 15 MIN: CPT | Performed by: CHIROPRACTOR

## 2023-02-13 PROCEDURE — 98941 CHIROPRACT MANJ 3-4 REGIONS: CPT | Mod: AT | Performed by: CHIROPRACTOR

## 2023-02-13 NOTE — PROGRESS NOTES
"Neck with constant tightness and \"binding feeling\". Headache at this time. 6/10 W24 8/10.   Using ultrasound, heat , meds, and Asprin with a small decrease.   Right lower back with constant burning and stabbing. 7/10 W24 8/10. Using heat and Asprin with a decrease.   Julee Deshpande  on 2/13/2023 at 3:35 PM    Reviewed by EW    Visit #:  2    Subjective:  Adin Madrid is a 63 year old male who is seen in f/u up for:        Segmental and somatic dysfunction of cervical region  Degeneration of cervical intervertebral disc  Tension-type headache, not intractable, unspecified chronicity pattern  Segmental and somatic dysfunction of thoracic region  Segmental and somatic dysfunction of sacral region  Neck muscle spasm  Back muscle spasm  Chronic low back pain, unspecified back pain laterality, unspecified whether sciatica present.     Since last visit on 1/19/2023,  Adin Madrid reports: daily headaches since last appt, no aggravating factors such as trauma/over use injuries  Doing home care measures listed above with little success.        (DVPRS) Pain Rating Score : Focus of attention, prevents doing daily activities (W24 8/10) (02/13/23 1532)     Objective:  The following was observed:  Pulse 115   Temp 97  F (36.1  C) (Tympanic)   Resp 18   SpO2 94%      Neck Disability Index (  Ahmet H. and Varun C. 1991. All rights reserved.; used with permission) 2/13/2023   SECTION 1 - PAIN INTENSITY 3   SECTION 2 - PERSONAL CARE 2   SECTION 3 - LIFTING 4   SECTION 4 - READING 3   SECTION 5 - HEADACHES 5   SECTION 6 - CONCENTRATION 3   SECTION 7 - WORK 2   SECTION 8 - DRIVING 3   SECTION 9 - SLEEPING 3   SECTION 10 - RECREATION 3   Count 10   Sum 31   Raw Score: /50 31   Neck Disability Index Score: (%) 62     Oswestry (JOSELYN) Questionnaire    OSWESTRY DISABILITY INDEX 2/13/2023   Count 9   Sum 25   Oswestry Score (%) 55.56   Some recent data might be hidden          P: palpatory tenderness Suboccipitals and cervical paraspinal " musculature bilaterally, left PSIS:    A: static palpation demonstrates intersegmental asymmetry , cervical, thoracic, pelvis  R: motion palpation notes restricted motion, C6 , T3 , T9  and Sacrum   T: marked spasms suboccipitals bilaterally, cervical paraspinals, quadratus lumborum bilaterally, lumbar paraspinals bilaterally, moderate spasms thoracic paraspinals bilaterally    Segmental spinal dysfunction/restrictions found at:  :  C6 Left lateral flexion restricted and Extension restriction  T3 Left lateral flexion restricted and Extension restriction  T9 Right lateral flexion restricted and Extension restriction  Sacrum Left lateral flexion restricted and Extension restriction.      Assessment: Symptoms continue after last encounter.  Encourage patient to follow-up with primary care provider for further evaluation of headaches as they have been ongoing for some time.  Noticeable increase of muscular spasms primarily of the cervical region which could be contributory to symptoms.  Due to patient's work schedule and our office hours we will plan to follow-up with patient on as-needed basis at this time.    Diagnoses:      1. Segmental and somatic dysfunction of cervical region    2. Degeneration of cervical intervertebral disc    3. Tension-type headache, not intractable, unspecified chronicity pattern    4. Segmental and somatic dysfunction of thoracic region    5. Segmental and somatic dysfunction of sacral region    6. Neck muscle spasm    7. Back muscle spasm    8. Chronic low back pain, unspecified back pain laterality, unspecified whether sciatica present        Patient's condition:  Patient had restrictions pre-manipulation    Treatment effectiveness:  Post manipulation there is better intersegmental movement and Patient claims to feel looser post manipulation      Procedures:  CMT:  35646 Chiropractic manipulative treatment 3-4 regions performed   Cervical: Light force diversified, C6, Supine  Thoracic:  Diversified, T3, T9, Prone  Pelvis: Diversified, Sacrum , Side posture    Modalities:  97839: Acupuncture, for 15 minutes:  Points: Bl 10, 22, 23, 24, 25, 46, 47, GV 8, 9, 10, 11, 12, 21, SI 15, TW 21,   For 15 minutes    Therapeutic procedures:  None    Response to Treatment  Reduction in symptoms as reported by patient    Prognosis: Good    Progress towards Goals: In progress     Recommendations:    Instructions:Consult with primary care provider for further evaluation    Follow-up:  Return to care if symptoms persist.

## 2023-03-23 ASSESSMENT — ENCOUNTER SYMPTOMS
HEMATURIA: 0
HEMATOCHEZIA: 0
SHORTNESS OF BREATH: 1
WEAKNESS: 1
CHILLS: 0
DIARRHEA: 0
MYALGIAS: 1
DIZZINESS: 1
NAUSEA: 1
ARTHRALGIAS: 1
NERVOUS/ANXIOUS: 1
DYSURIA: 0
FEVER: 1
CONSTIPATION: 0
PARESTHESIAS: 0
HEADACHES: 1
PALPITATIONS: 0
EYE PAIN: 0
SORE THROAT: 0
FREQUENCY: 0
COUGH: 1
HEARTBURN: 0
JOINT SWELLING: 1
ABDOMINAL PAIN: 0

## 2023-03-23 ASSESSMENT — PATIENT HEALTH QUESTIONNAIRE - PHQ9
10. IF YOU CHECKED OFF ANY PROBLEMS, HOW DIFFICULT HAVE THESE PROBLEMS MADE IT FOR YOU TO DO YOUR WORK, TAKE CARE OF THINGS AT HOME, OR GET ALONG WITH OTHER PEOPLE: SOMEWHAT DIFFICULT
SUM OF ALL RESPONSES TO PHQ QUESTIONS 1-9: 5
SUM OF ALL RESPONSES TO PHQ QUESTIONS 1-9: 5

## 2023-03-27 ENCOUNTER — OFFICE VISIT (OUTPATIENT)
Dept: FAMILY MEDICINE | Facility: OTHER | Age: 63
End: 2023-03-27
Attending: FAMILY MEDICINE
Payer: COMMERCIAL

## 2023-03-27 VITALS
HEART RATE: 66 BPM | DIASTOLIC BLOOD PRESSURE: 85 MMHG | RESPIRATION RATE: 16 BRPM | BODY MASS INDEX: 32.14 KG/M2 | WEIGHT: 217 LBS | SYSTOLIC BLOOD PRESSURE: 121 MMHG | TEMPERATURE: 96.9 F | OXYGEN SATURATION: 95 % | HEIGHT: 69 IN

## 2023-03-27 DIAGNOSIS — Z13.29 SCREENING FOR HYPOTHYROIDISM: ICD-10-CM

## 2023-03-27 DIAGNOSIS — Z13.1 SCREENING FOR DIABETES MELLITUS: ICD-10-CM

## 2023-03-27 DIAGNOSIS — Z13.0 SCREENING FOR DEFICIENCY ANEMIA: ICD-10-CM

## 2023-03-27 DIAGNOSIS — Z13.220 SCREENING FOR HYPERLIPIDEMIA: ICD-10-CM

## 2023-03-27 DIAGNOSIS — J45.30 MILD PERSISTENT ASTHMA WITHOUT COMPLICATION: ICD-10-CM

## 2023-03-27 DIAGNOSIS — F41.9 ANXIETY: ICD-10-CM

## 2023-03-27 DIAGNOSIS — E11.9 TYPE 2 DIABETES MELLITUS WITHOUT COMPLICATION, WITHOUT LONG-TERM CURRENT USE OF INSULIN (H): ICD-10-CM

## 2023-03-27 DIAGNOSIS — R73.03 PREDIABETES: ICD-10-CM

## 2023-03-27 DIAGNOSIS — Z12.5 SCREENING FOR PROSTATE CANCER: ICD-10-CM

## 2023-03-27 DIAGNOSIS — Z00.00 VISIT FOR PREVENTIVE HEALTH EXAMINATION: Primary | ICD-10-CM

## 2023-03-27 DIAGNOSIS — Z13.228 SCREENING FOR METABOLIC DISORDER: ICD-10-CM

## 2023-03-27 DIAGNOSIS — G47.33 OBSTRUCTIVE SLEEP APNEA SYNDROME: ICD-10-CM

## 2023-03-27 DIAGNOSIS — F33.0 MAJOR DEPRESSIVE DISORDER, RECURRENT EPISODE, MILD (H): ICD-10-CM

## 2023-03-27 LAB
ALBUMIN SERPL BCG-MCNC: 4 G/DL (ref 3.5–5.2)
ALP SERPL-CCNC: 87 U/L (ref 40–129)
ALT SERPL W P-5'-P-CCNC: 47 U/L (ref 10–50)
ANION GAP SERPL CALCULATED.3IONS-SCNC: 9 MMOL/L (ref 7–15)
AST SERPL W P-5'-P-CCNC: 43 U/L (ref 10–50)
BASOPHILS # BLD AUTO: 0.1 10E3/UL (ref 0–0.2)
BASOPHILS NFR BLD AUTO: 1 %
BILIRUB SERPL-MCNC: 0.5 MG/DL
BUN SERPL-MCNC: 37.1 MG/DL (ref 8–23)
CALCIUM SERPL-MCNC: 9.7 MG/DL (ref 8.8–10.2)
CHLORIDE SERPL-SCNC: 106 MMOL/L (ref 98–107)
CHOLEST SERPL-MCNC: 132 MG/DL
CREAT SERPL-MCNC: 1.27 MG/DL (ref 0.67–1.17)
DEPRECATED HCO3 PLAS-SCNC: 23 MMOL/L (ref 22–29)
EOSINOPHIL # BLD AUTO: 0.1 10E3/UL (ref 0–0.7)
EOSINOPHIL NFR BLD AUTO: 1 %
ERYTHROCYTE [DISTWIDTH] IN BLOOD BY AUTOMATED COUNT: 13.7 % (ref 10–15)
GFR SERPL CREATININE-BSD FRML MDRD: 64 ML/MIN/1.73M2
GLUCOSE SERPL-MCNC: 203 MG/DL (ref 70–99)
HBA1C MFR BLD: 6.9 % (ref 4–6.2)
HCT VFR BLD AUTO: 48.3 % (ref 40–53)
HDLC SERPL-MCNC: 40 MG/DL
HGB BLD-MCNC: 16.5 G/DL (ref 13.3–17.7)
HOLD SPECIMEN: NORMAL
IMM GRANULOCYTES # BLD: 0.1 10E3/UL
IMM GRANULOCYTES NFR BLD: 1 %
LDLC SERPL CALC-MCNC: 69 MG/DL
LYMPHOCYTES # BLD AUTO: 1.8 10E3/UL (ref 0.8–5.3)
LYMPHOCYTES NFR BLD AUTO: 16 %
MCH RBC QN AUTO: 28.9 PG (ref 26.5–33)
MCHC RBC AUTO-ENTMCNC: 34.2 G/DL (ref 31.5–36.5)
MCV RBC AUTO: 85 FL (ref 78–100)
MONOCYTES # BLD AUTO: 1.2 10E3/UL (ref 0–1.3)
MONOCYTES NFR BLD AUTO: 11 %
NEUTROPHILS # BLD AUTO: 7.8 10E3/UL (ref 1.6–8.3)
NEUTROPHILS NFR BLD AUTO: 70 %
NONHDLC SERPL-MCNC: 92 MG/DL
NRBC # BLD AUTO: 0 10E3/UL
NRBC BLD AUTO-RTO: 0 /100
PLATELET # BLD AUTO: 251 10E3/UL (ref 150–450)
POTASSIUM SERPL-SCNC: 3.9 MMOL/L (ref 3.4–5.3)
PROT SERPL-MCNC: 6.1 G/DL (ref 6.4–8.3)
PSA SERPL DL<=0.01 NG/ML-MCNC: 1.54 NG/ML (ref 0–4.5)
RBC # BLD AUTO: 5.7 10E6/UL (ref 4.4–5.9)
SODIUM SERPL-SCNC: 138 MMOL/L (ref 136–145)
TRIGL SERPL-MCNC: 113 MG/DL
TSH SERPL DL<=0.005 MIU/L-ACNC: 2.01 UIU/ML (ref 0.3–4.2)
WBC # BLD AUTO: 10.9 10E3/UL (ref 4–11)

## 2023-03-27 PROCEDURE — 83036 HEMOGLOBIN GLYCOSYLATED A1C: CPT | Mod: ZL | Performed by: FAMILY MEDICINE

## 2023-03-27 PROCEDURE — 99396 PREV VISIT EST AGE 40-64: CPT | Performed by: FAMILY MEDICINE

## 2023-03-27 PROCEDURE — 80061 LIPID PANEL: CPT | Mod: ZL | Performed by: FAMILY MEDICINE

## 2023-03-27 PROCEDURE — 99215 OFFICE O/P EST HI 40 MIN: CPT | Mod: 25 | Performed by: FAMILY MEDICINE

## 2023-03-27 PROCEDURE — 85025 COMPLETE CBC W/AUTO DIFF WBC: CPT | Mod: ZL | Performed by: FAMILY MEDICINE

## 2023-03-27 PROCEDURE — 84153 ASSAY OF PSA TOTAL: CPT | Mod: ZL | Performed by: FAMILY MEDICINE

## 2023-03-27 PROCEDURE — 80053 COMPREHEN METABOLIC PANEL: CPT | Mod: ZL | Performed by: FAMILY MEDICINE

## 2023-03-27 PROCEDURE — 36415 COLL VENOUS BLD VENIPUNCTURE: CPT | Mod: ZL | Performed by: FAMILY MEDICINE

## 2023-03-27 PROCEDURE — 84443 ASSAY THYROID STIM HORMONE: CPT | Mod: ZL | Performed by: FAMILY MEDICINE

## 2023-03-27 RX ORDER — PAROXETINE 40 MG/1
20 TABLET, FILM COATED ORAL DAILY
Qty: 180 TABLET | Refills: 3 | Status: SHIPPED | OUTPATIENT
Start: 2023-03-27 | End: 2023-04-13

## 2023-03-27 RX ORDER — METFORMIN HCL 500 MG
500 TABLET, EXTENDED RELEASE 24 HR ORAL
Qty: 90 TABLET | Refills: 11 | Status: SHIPPED | OUTPATIENT
Start: 2023-03-27 | End: 2023-09-19

## 2023-03-27 RX ORDER — ROSUVASTATIN CALCIUM 5 MG/1
5 TABLET, COATED ORAL DAILY
Qty: 90 TABLET | Refills: 4 | Status: SHIPPED | OUTPATIENT
Start: 2023-03-27 | End: 2023-09-19

## 2023-03-27 RX ORDER — GLUCOSAMINE HCL/CHONDROITIN SU 500-400 MG
CAPSULE ORAL
Qty: 100 EACH | Refills: 3 | Status: SHIPPED | OUTPATIENT
Start: 2023-03-27 | End: 2023-03-28

## 2023-03-27 RX ORDER — BUDESONIDE AND FORMOTEROL FUMARATE DIHYDRATE 160; 4.5 UG/1; UG/1
2 AEROSOL RESPIRATORY (INHALATION) 2 TIMES DAILY
Qty: 10.2 G | Refills: 11 | Status: SHIPPED | OUTPATIENT
Start: 2023-03-27 | End: 2023-04-13

## 2023-03-27 RX ORDER — BUPROPION HYDROCHLORIDE 150 MG/1
150 TABLET ORAL EVERY MORNING
Qty: 90 TABLET | Refills: 11 | Status: SHIPPED | OUTPATIENT
Start: 2023-03-27 | End: 2023-04-13

## 2023-03-27 RX ORDER — LANCETS
EACH MISCELLANEOUS
Qty: 100 EACH | Refills: 6 | Status: SHIPPED | OUTPATIENT
Start: 2023-03-27

## 2023-03-27 ASSESSMENT — ANXIETY QUESTIONNAIRES
7. FEELING AFRAID AS IF SOMETHING AWFUL MIGHT HAPPEN: NOT AT ALL
GAD7 TOTAL SCORE: 7
5. BEING SO RESTLESS THAT IT IS HARD TO SIT STILL: SEVERAL DAYS
GAD7 TOTAL SCORE: 7
4. TROUBLE RELAXING: MORE THAN HALF THE DAYS
7. FEELING AFRAID AS IF SOMETHING AWFUL MIGHT HAPPEN: NOT AT ALL
6. BECOMING EASILY ANNOYED OR IRRITABLE: SEVERAL DAYS
1. FEELING NERVOUS, ANXIOUS, OR ON EDGE: SEVERAL DAYS
3. WORRYING TOO MUCH ABOUT DIFFERENT THINGS: SEVERAL DAYS
2. NOT BEING ABLE TO STOP OR CONTROL WORRYING: SEVERAL DAYS
IF YOU CHECKED OFF ANY PROBLEMS ON THIS QUESTIONNAIRE, HOW DIFFICULT HAVE THESE PROBLEMS MADE IT FOR YOU TO DO YOUR WORK, TAKE CARE OF THINGS AT HOME, OR GET ALONG WITH OTHER PEOPLE: NOT DIFFICULT AT ALL
8. IF YOU CHECKED OFF ANY PROBLEMS, HOW DIFFICULT HAVE THESE MADE IT FOR YOU TO DO YOUR WORK, TAKE CARE OF THINGS AT HOME, OR GET ALONG WITH OTHER PEOPLE?: NOT DIFFICULT AT ALL

## 2023-03-27 ASSESSMENT — PAIN SCALES - GENERAL: PAINLEVEL: SEVERE PAIN (7)

## 2023-03-27 NOTE — NURSING NOTE
"Chief Complaint   Patient presents with     Physical       Initial /85   Pulse 66   Temp 96.9  F (36.1  C) (Temporal)   Resp 16   Ht 1.753 m (5' 9\")   Wt 98.4 kg (217 lb)   SpO2 95%   BMI 32.05 kg/m   Estimated body mass index is 32.05 kg/m  as calculated from the following:    Height as of this encounter: 1.753 m (5' 9\").    Weight as of this encounter: 98.4 kg (217 lb).  Medication Reconciliation: complete    FOOD SECURITY SCREENING QUESTIONS  Hunger Vital Signs:  Within the past 12 months we worried whether our food would run out before we got money to buy more. Never  Within the past 12 months the food we bought just didn't last and we didn't have money to get more. Never        Advance care directive on file? no  Advance care directive provided to patient? declined     Chaya Pimentel, NATALI  "

## 2023-03-27 NOTE — PROGRESS NOTES
SUBJECTIVE:   CC: Adin is an 63 year old who presents for preventative health visit.   No flowsheet data found.  Patient has been advised of split billing requirements and indicates understanding: Yes  Healthy Habits:     Getting at least 3 servings of Calcium per day:  NO    Bi-annual eye exam:  Yes    Dental care twice a year:  Yes    Sleep apnea or symptoms of sleep apnea:  Daytime drowsiness, Excessive snoring and Sleep apnea    Diet:  Regular (no restrictions)    Frequency of exercise:  1 day/week    Duration of exercise:  15-30 minutes    Taking medications regularly:  Yes    Medication side effects:  Muscle aches, Significant flushing and Lightheadedness    PHQ-2 Total Score: 2    Additional concerns today:  Yes              Today's PHQ-2 Score:   PHQ-2 ( 1999 [employer]) 3/23/2023   Q1: Little interest or pleasure in doing things 1   Q2: Feeling down, depressed or hopeless 1   PHQ-2 Score 2   PHQ-2 Total Score (12-17 Years)- Positive if 3 or more points; Administer PHQ-A if positive -   Q1: Little interest or pleasure in doing things Several days   Q2: Feeling down, depressed or hopeless Several days   PHQ-2 Score 2       Have you ever done Advance Care Planning? (For example, a Health Directive, POLST, or a discussion with a medical provider or your loved ones about your wishes): No, advance care planning information given to patient to review.  Patient declined advance care planning discussion at this time.    Social History     Tobacco Use     Smoking status: Never     Smokeless tobacco: Never   Substance Use Topics     Alcohol use: No         Alcohol Use 3/23/2023   Prescreen: >3 drinks/day or >7 drinks/week? Not Applicable       Last PSA:   PSA Tumor Marker   Date Value Ref Range Status   03/01/2022 1.07 0.00 - 4.00 ug/L Final       Reviewed orders with patient. Reviewed health maintenance and updated orders accordingly - YES  Lab work is in process    Reviewed and updated as needed this visit by clinical  "staff   Tobacco  Allergies  Meds              Reviewed and updated as needed this visit by Provider                     Review of Systems      OBJECTIVE:   /85   Pulse 66   Temp 96.9  F (36.1  C) (Temporal)   Resp 16   Ht 1.753 m (5' 9\")   Wt 98.4 kg (217 lb)   SpO2 95%   BMI 32.05 kg/m      Physical Exam          ASSESSMENT/PLAN:   Adin was seen today for physical.    Diagnoses and all orders for this visit:    Visit for preventive health examination    Screening for deficiency anemia  -     CBC with Platelets & Differential; Future  -     CBC with Platelets & Differential    Screening for metabolic disorder  -     Comprehensive metabolic panel; Future  -     Comprehensive metabolic panel    Screening for hyperlipidemia  -     Hemoglobin A1c; Future  -     Hemoglobin A1c    Screening for prostate cancer  -     PSA tumor marker; Future  -     PSA tumor marker    Screening for diabetes mellitus  -     Lipid Profile; Future  -     Lipid Profile    Screening for hypothyroidism  -     TSH with free T4 reflex; Future  -     TSH with free T4 reflex    Obstructive sleep apnea syndrome    Anxiety  -     buPROPion (WELLBUTRIN XL) 150 MG 24 hr tablet; Take 1 tablet (150 mg) by mouth every morning  -     PARoxetine (PAXIL) 40 MG tablet; Take 0.5 tablets (20 mg) by mouth daily    Prediabetes  -     Lipid Profile; Future  -     Lipid Profile    Mild persistent asthma without complication  -     budesonide-formoterol (SYMBICORT) 160-4.5 MCG/ACT Inhaler; Inhale 2 puffs into the lungs 2 times daily    Type 2 diabetes mellitus without complication, without long-term current use of insulin (H)  -     AMB Adult Diabetes Educator Referral; Future  -     blood glucose monitoring (NO BRAND SPECIFIED) meter device kit; Use to test blood sugar 3 times daily or as directed. Preferred blood glucose meter OR supplies to accompany: Blood Glucose Monitor Brands: per insurance.  -     blood glucose (NO BRAND SPECIFIED) test " "strip; Use to test blood sugar 3 times daily or as directed. To accompany: Blood Glucose Monitor Brands: per insurance.  -     blood glucose calibration (NO BRAND SPECIFIED) solution; To accompany: Blood Glucose Monitor Brands: per insurance.  -     thin (NO BRAND SPECIFIED) lancets; Use with lanceting device. To accompany: Blood Glucose Monitor Brands: per insurance.  -     alcohol swab prep pads; Use to swab area of injection/codey as directed.  -     metFORMIN (GLUCOPHAGE XR) 500 MG 24 hr tablet; Take 1 tablet (500 mg) by mouth daily (with dinner)  -     rosuvastatin (CRESTOR) 5 MG tablet; Take 1 tablet (5 mg) by mouth daily    Major depressive disorder, recurrent episode, mild (H)  -     buPROPion (WELLBUTRIN XL) 150 MG 24 hr tablet; Take 1 tablet (150 mg) by mouth every morning    Other orders  -     Extra Tube; Future  -     Extra Tube        Patient has been advised of split billing requirements and indicates understanding: Yes      COUNSELING:   Reviewed preventive health counseling, as reflected in patient instructions       Regular exercise       Healthy diet/nutrition       Colorectal cancer screening       Prostate cancer screening      BMI:   Estimated body mass index is 32.05 kg/m  as calculated from the following:    Height as of this encounter: 1.753 m (5' 9\").    Weight as of this encounter: 98.4 kg (217 lb).   Weight management plan: Discussed healthy diet and exercise guidelines      He reports that he has never smoked. He has never used smokeless tobacco.            Juan Rojsa MD  [facility]  Answers for HPI/ROS submitted by the patient on 3/23/2023  If you checked off any problems, how difficult have these problems made it for you to do your work, take care of things at home, or get along with other people?: Somewhat difficult  PHQ9 TOTAL SCORE: 5  MODESTA 7 TOTAL SCORE: 7      "

## 2023-03-27 NOTE — PROGRESS NOTES
"Nursing Notes:   Chaya Pimentel LPN  3/27/2023  4:49 PM  Signed  Chief Complaint   Patient presents with     Physical       Initial /85   Pulse 66   Temp 96.9  F (36.1  C) (Temporal)   Resp 16   Ht 1.753 m (5' 9\")   Wt 98.4 kg (217 lb)   SpO2 95%   BMI 32.05 kg/m   Estimated body mass index is 32.05 kg/m  as calculated from the following:    Height as of this encounter: 1.753 m (5' 9\").    Weight as of this encounter: 98.4 kg (217 lb).  Medication Reconciliation: complete    FOOD SECURITY SCREENING QUESTIONS  Hunger Vital Signs:  Within the past 12 months we worried whether our food would run out before we got money to buy more. Never  Within the past 12 months the food we bought just didn't last and we didn't have money to get more. Never        Advance care directive on file? no  Advance care directive provided to patient? declined     Chaya Pimentel LPN    SUBJECTIVE:  Adin Madrid  is a 63 year old male who comes in today for complete evaluation.  I last saw him about a year ago.    He has had some lung congestion.  He will have some shortness of breath at times. He has never had Covid. Never a smoker. Never asthma in the past.  He has an unsettled feeling in his stomach.      He has noticed some mild lightheadness at times.     He has prediabetes.  Last year we discussed carbohydrate intake and recommended a 10 pound weight loss. That did not happen.     He has MARIANGEL and is on CPAP which he uses regularly and finds helpful. He likes his new machine.     He has been on Paxil 40 mg daily with good control of his anxiety. He is also on Wellbutrin.    He has had some trouble with headaches and has been seeing the chiropractor.  He has had some muscle spasms in the cervical region.  He had seen physical therapy before that.  X-ray showed some moderate disc disease at C5-6 and C6-7. He does ultrasound he uses and has done acupuncture and it helps. He has seen Yony Nath and is happy with it.     He " is due for colonoscopy next year.  He is vaccinated boosted for COVID-19 but has not had the newest booster.  He did get a flu shot this year and his tetanus is up-to-date.  He has not had Shingrix.    Past Medical, Family, and Social History reviewed and updated as noted below.   ROS is negative except as noted above       No Known Allergies,   Family History   Problem Relation Age of Onset     Diabetes Mother         Diabetes     Other - See Comments Mother         early dementia     Hyperlipidemia Father         Hyperlipidemia,High cholesterol     Other - See Comments Father         Stroke   ,   Current Outpatient Medications   Medication     alcohol swab prep pads     blood glucose (NO BRAND SPECIFIED) test strip     blood glucose calibration (NO BRAND SPECIFIED) solution     blood glucose monitoring (NO BRAND SPECIFIED) meter device kit     budesonide-formoterol (SYMBICORT) 160-4.5 MCG/ACT Inhaler     buPROPion (WELLBUTRIN XL) 150 MG 24 hr tablet     diphenhydrAMINE-acetaminophen (TYLENOL PM)  MG tablet     etodolac (LODINE) 500 MG tablet     metFORMIN (GLUCOPHAGE XR) 500 MG 24 hr tablet     PARoxetine (PAXIL) 40 MG tablet     rosuvastatin (CRESTOR) 5 MG tablet     thin (NO BRAND SPECIFIED) lancets     ketoconazole (NIZORAL) 2 % external cream     No current facility-administered medications for this visit.   ,   Past Medical History:   Diagnosis Date     Encounter for general adult medical examination without abnormal findings     9/20/04,Satisfactory     Gastro-esophageal reflux disease without esophagitis     resolved     Metatarsalgia     resolved.     Strain of muscle and tendon of unspecified wall of thorax, initial encounter     No Comments Provided   ,   Patient Active Problem List    Diagnosis Date Noted     Diabetes mellitus, type 2 (H) 03/27/2023     Priority: Medium     Dermatofibroma 08/26/2021     Priority: Medium     Formatting of this note might be different from the original.  Right  "shoulder       Jammed finger (interphalangeal joint), initial encounter 04/18/2018     Priority: Medium     Anxiety 02/01/2018     Priority: Medium     Chronic low back pain 05/18/2015     Priority: Medium     Chronic neck pain 05/18/2015     Priority: Medium   ,   Past Surgical History:   Procedure Laterality Date     COLONOSCOPY  01/08/2014 1/2014,Melanosis coli - normal; follow up 10 years     VASECTOMY      01/06    and   Social History     Tobacco Use     Smoking status: Never     Smokeless tobacco: Never   Substance Use Topics     Alcohol use: No     OBJECTIVE:  /85   Pulse 66   Temp 96.9  F (36.1  C) (Temporal)   Resp 16   Ht 1.753 m (5' 9\")   Wt 98.4 kg (217 lb)   SpO2 95%   BMI 32.05 kg/m     EXAM:  General Appearance: Pleasant, alert, appropriate appearance for age. No acute distress  Head Exam: Normal. Normocephalic, atraumatic.  Eye Exam:  Normal external eyes, conjunctivae, lids, cornea. GERALDINE. EOMI  Ear Exam: Normal TM's bilaterally. Normal auditory canals and external ears. Non-tender.  Nose Exam: Normal external nose, mucus membranes, and septum.  OroPharynx Exam:  Dental hygiene adequate. Normal buccal mucosa. Normal pharynx.  Neck Exam:  Supple, no masses or nodes. No audible bruits  Thyroid Exam: No nodules or enlargement.  Chest/Respiratory Exam: Normal chest wall and respirations. Clear to auscultation.  Cardiovascular Exam: Regular rate and rhythm. S1, S2, no murmur, click, gallop, or rubs.  Gastrointestinal Exam: Soft, non-tender, no masses or organomegaly.  Lymphatic Exam: Non-palpable nodes in neck, clavicular regions.  Musculoskeletal Exam: Back is straight and non-tender, full ROM of upper and lower extremities.  Foot Exam: Left and right foot: good pedal pulses, no lesions, nail hygiene good. Normal sensory testing with #10 monofilament.   Skin: no rash or abnormalities  Neurologic Exam: Nonfocal, normal gross motor, tone coordination and no tremor.  Psychiatric Exam: Alert " and oriented - appropriate affect.     Results for orders placed or performed in visit on 03/27/23   PSA tumor marker     Status: Normal   Result Value Ref Range    PSA Tumor Marker 1.54 0.00 - 4.50 ng/mL    Narrative    This result is obtained using the Roche Elecsys total PSA method on the uziel [id number] immunoassay analyzer. Results obtained with different assay methods or kits cannot be used interchangeably.   TSH with free T4 reflex     Status: Normal   Result Value Ref Range    TSH 2.01 0.30 - 4.20 uIU/mL   Lipid Profile     Status: Normal   Result Value Ref Range    Cholesterol 132 <200 mg/dL    Triglycerides 113 <150 mg/dL    Direct Measure HDL 40 >=40 mg/dL    LDL Cholesterol Calculated 69 <=100 mg/dL    Non HDL Cholesterol 92 <130 mg/dL    Narrative    Cholesterol  Desirable:  <200 mg/dL    Triglycerides  Normal:  Less than 150 mg/dL  Borderline High:  150-199 mg/dL  High:  200-499 mg/dL  Very High:  Greater than or equal to 500 mg/dL    Direct Measure HDL  Female:  Greater than or equal to 50 mg/dL   Male:  Greater than or equal to 40 mg/dL    LDL Cholesterol  Desirable:  <100mg/dL  Above Desirable:  100-129 mg/dL   Borderline High:  130-159 mg/dL   High:  160-189 mg/dL   Very High:  >= 190 mg/dL    Non HDL Cholesterol  Desirable:  130 mg/dL  Above Desirable:  130-159 mg/dL  Borderline High:  160-189 mg/dL  High:  190-219 mg/dL  Very High:  Greater than or equal to 220 mg/dL   Hemoglobin A1c     Status: Abnormal   Result Value Ref Range    Hemoglobin A1C 6.9 (H) 4.0 - 6.2 %   Comprehensive metabolic panel     Status: Abnormal   Result Value Ref Range    Sodium 138 136 - 145 mmol/L    Potassium 3.9 3.4 - 5.3 mmol/L    Chloride 106 98 - 107 mmol/L    Carbon Dioxide (CO2) 23 22 - 29 mmol/L    Anion Gap 9 7 - 15 mmol/L    Urea Nitrogen 37.1 (H) 8.0 - 23.0 mg/dL    Creatinine 1.27 (H) 0.67 - 1.17 mg/dL    Calcium 9.7 8.8 - 10.2 mg/dL    Glucose 203 (H) 70 - 99 mg/dL    Alkaline Phosphatase 87 40 - 129 U/L    AST  43 10 - 50 U/L    ALT 47 10 - 50 U/L    Protein Total 6.1 (L) 6.4 - 8.3 g/dL    Albumin 4.0 3.5 - 5.2 g/dL    Bilirubin Total 0.5 <=1.2 mg/dL    GFR Estimate 64 >60 mL/min/1.73m2   CBC with platelets and differential     Status: None   Result Value Ref Range    WBC Count 10.9 4.0 - 11.0 10e3/uL    RBC Count 5.70 4.40 - 5.90 10e6/uL    Hemoglobin 16.5 13.3 - 17.7 g/dL    Hematocrit 48.3 40.0 - 53.0 %    MCV 85 78 - 100 fL    MCH 28.9 26.5 - 33.0 pg    MCHC 34.2 31.5 - 36.5 g/dL    RDW 13.7 10.0 - 15.0 %    Platelet Count 251 150 - 450 10e3/uL    % Neutrophils 70 %    % Lymphocytes 16 %    % Monocytes 11 %    % Eosinophils 1 %    % Basophils 1 %    % Immature Granulocytes 1 %    NRBCs per 100 WBC 0 <1 /100    Absolute Neutrophils 7.8 1.6 - 8.3 10e3/uL    Absolute Lymphocytes 1.8 0.8 - 5.3 10e3/uL    Absolute Monocytes 1.2 0.0 - 1.3 10e3/uL    Absolute Eosinophils 0.1 0.0 - 0.7 10e3/uL    Absolute Basophils 0.1 0.0 - 0.2 10e3/uL    Absolute Immature Granulocytes 0.1 <=0.4 10e3/uL    Absolute NRBCs 0.0 10e3/uL   Extra Tube     Status: None (In process)    Narrative    The following orders were created for panel order Extra Tube.  Procedure                               Abnormality         Status                     ---------                               -----------         ------                     Extra Serum Separator Tu...[[id number]]                      In process                   Please view results for these tests on the individual orders.   CBC with Platelets & Differential     Status: None    Narrative    The following orders were created for panel order CBC with Platelets & Differential.  Procedure                               Abnormality         Status                     ---------                               -----------         ------                     CBC with platelets and d...[[id number]]                      Final result                 Please view results for these tests on the individual orders.       ASSESSMENT/Plan :    Adin was seen today for physical.    Diagnoses and all orders for this visit:    Visit for preventive health examination    Screening for deficiency anemia  -     CBC with Platelets & Differential; Future  -     CBC with Platelets & Differential    Screening for metabolic disorder  -     Comprehensive metabolic panel; Future  -     Comprehensive metabolic panel    Screening for hyperlipidemia  -     Hemoglobin A1c; Future  -     Hemoglobin A1c    Screening for prostate cancer  -     PSA tumor marker; Future  -     PSA tumor marker    Screening for diabetes mellitus  -     Lipid Profile; Future  -     Lipid Profile    Screening for hypothyroidism  -     TSH with free T4 reflex; Future  -     TSH with free T4 reflex    Obstructive sleep apnea syndrome    Anxiety  -     buPROPion (WELLBUTRIN XL) 150 MG 24 hr tablet; Take 1 tablet (150 mg) by mouth every morning  -     PARoxetine (PAXIL) 40 MG tablet; Take 0.5 tablets (20 mg) by mouth daily    Prediabetes  -     Lipid Profile; Future  -     Lipid Profile    Mild persistent asthma without complication  -     budesonide-formoterol (SYMBICORT) 160-4.5 MCG/ACT Inhaler; Inhale 2 puffs into the lungs 2 times daily    Type 2 diabetes mellitus without complication, without long-term current use of insulin (H)  -     [facility] Adult Diabetes Educator Referral; Future  -     blood glucose monitoring (NO BRAND SPECIFIED) meter device kit; Use to test blood sugar 3 times daily or as directed. Preferred blood glucose meter OR supplies to accompany: Blood Glucose Monitor Brands: per insurance.  -     blood glucose (NO BRAND SPECIFIED) test strip; Use to test blood sugar 3 times daily or as directed. To accompany: Blood Glucose Monitor Brands: per insurance.  -     blood glucose calibration (NO BRAND SPECIFIED) solution; To accompany: Blood Glucose Monitor Brands: per insurance.  -     thin (NO BRAND SPECIFIED) lancets; Use with lanceting device. To accompany:  Blood Glucose Monitor Brands: per insurance.  -     alcohol swab prep pads; Use to swab area of injection/codey as directed.  -     metFORMIN (GLUCOPHAGE XR) 500 MG 24 hr tablet; Take 1 tablet (500 mg) by mouth daily (with dinner)  -     rosuvastatin (CRESTOR) 5 MG tablet; Take 1 tablet (5 mg) by mouth daily    Major depressive disorder, recurrent episode, mild (H)  -     buPROPion (WELLBUTRIN XL) 150 MG 24 hr tablet; Take 1 tablet (150 mg) by mouth every morning    Other orders  -     Extra Tube; Future  -     Extra Tube      Reviewed labs with him.  Discussed his new diagnosis of diabetes.  At this point he is well controlled without having done much of anything.  Discussed importance of carbohydrate modulation and how that impacted his insulin level.  We discussed potentially using an insulin sensitizer like metformin and he like to go ahead with that.  We will start on 500 mg of the extended release preparation once daily and also referred him for diabetic education.  We sent prescription for diabetic supplies for testing but will leave it up to him where he starts that right away or wait until he has his diabetic add.    Discussed importance of being on a statin and will place on 5 mg of rosuvastatin daily.  Lipids actually look good at this point already and his calcium score was only 1 1 tested a few years ago.    For his breathing, he was never smoker.  No evidence of significant heart disease on calcium score a few years ago.  Has wheezed a bit and he might have some underlying asthma.  We will do a trial of Symbicort to see puffs twice daily as both a controller and a rescue medication.  He can adjust the dose as tolerated.  Instructed on inhaler use.    Recommend follow-up in 6 months, sooner if needed.    He will continue to work with Dr. Nath for his neck and back.  If he is not getting relief he will let us know.    A total of 45 minutes was spent with the patient, reviewing records, tests, ordering  medications, tests or procedures and documenting clinical information in the EHR in addition to Medicare Wellness.       Juan Rojas MD    Answers for HPI/ROS submitted by the patient on 3/23/2023  If you checked off any problems, how difficult have these problems made it for you to do your work, take care of things at home, or get along with other people?: Somewhat difficult  PHQ9 TOTAL SCORE: 5  Frequency of exercise:: 1 day/week  Getting at least 3 servings of Calcium per day:: NO  Diet:: Regular (no restrictions)  Taking medications regularly:: Yes  Medication side effects:: Muscle aches, Significant flushing, Lightheadedness  Bi-annual eye exam:: Yes  Dental care twice a year:: Yes  Sleep apnea or symptoms of sleep apnea:: Daytime drowsiness, Excessive snoring, Sleep apnea  abdominal pain: No  Blood in stool: No  Blood in urine: No  chest pain: Yes  chills: No  congestion: Yes  constipation: No  cough: Yes  diarrhea: No  dizziness: Yes  ear pain: No  eye pain: No  nervous/anxious: Yes  fever: Yes  frequency: No  genital sores: No  headaches: Yes  hearing loss: Yes  heartburn: No  arthralgias: Yes  joint swelling: Yes  peripheral edema: No  mood changes: No  myalgias: Yes  nausea: Yes  dysuria: No  palpitations: No  Skin sensation changes: No  sore throat: No  urgency: No  rash: No  shortness of breath: Yes  visual disturbance: No  weakness: Yes  impotence: No  penile discharge: No  Additional concerns today:: Yes  Duration of exercise:: 15-30 minutes

## 2023-03-28 RX ORDER — UBIQUINOL 100 MG
CAPSULE ORAL
Qty: 200 EACH | Refills: 3 | Status: SHIPPED | OUTPATIENT
Start: 2023-03-28 | End: 2024-07-18

## 2023-03-28 NOTE — TELEPHONE ENCOUNTER
[facility] sent Rx request for the following:    ALCOHOL PREP PADS 100S  Last Prescription Date:   3/27/23  Last Fill Qty/Refills:         100, R-3    Last Office Visit:              3/27/23   Future Office visit:           None   Patient requests 90 day supply.  Estela Canales RN on 3/28/2023 at 11:42 AM

## 2023-04-03 ENCOUNTER — APPOINTMENT (OUTPATIENT)
Dept: CARDIOLOGY | Facility: OTHER | Age: 63
End: 2023-04-03
Attending: EMERGENCY MEDICINE
Payer: COMMERCIAL

## 2023-04-03 ENCOUNTER — APPOINTMENT (OUTPATIENT)
Dept: GENERAL RADIOLOGY | Facility: OTHER | Age: 63
End: 2023-04-03
Attending: EMERGENCY MEDICINE
Payer: COMMERCIAL

## 2023-04-03 ENCOUNTER — OFFICE VISIT (OUTPATIENT)
Dept: PEDIATRICS | Facility: OTHER | Age: 63
End: 2023-04-03
Attending: INTERNAL MEDICINE
Payer: COMMERCIAL

## 2023-04-03 ENCOUNTER — HOSPITAL ENCOUNTER (EMERGENCY)
Facility: OTHER | Age: 63
Discharge: ANOTHER HOSPITAL | End: 2023-04-03
Attending: EMERGENCY MEDICINE | Admitting: EMERGENCY MEDICINE
Payer: COMMERCIAL

## 2023-04-03 VITALS
RESPIRATION RATE: 20 BRPM | OXYGEN SATURATION: 91 % | SYSTOLIC BLOOD PRESSURE: 87 MMHG | TEMPERATURE: 96.5 F | DIASTOLIC BLOOD PRESSURE: 52 MMHG | HEART RATE: 73 BPM | WEIGHT: 226.5 LBS | BODY MASS INDEX: 33.45 KG/M2

## 2023-04-03 VITALS
HEIGHT: 70 IN | WEIGHT: 226 LBS | SYSTOLIC BLOOD PRESSURE: 112 MMHG | HEART RATE: 128 BPM | BODY MASS INDEX: 32.35 KG/M2 | TEMPERATURE: 96.3 F | RESPIRATION RATE: 12 BRPM | OXYGEN SATURATION: 98 % | DIASTOLIC BLOOD PRESSURE: 93 MMHG

## 2023-04-03 DIAGNOSIS — R10.9 LEFT SIDED ABDOMINAL PAIN: ICD-10-CM

## 2023-04-03 DIAGNOSIS — R00.0 WIDE-COMPLEX TACHYCARDIA: Primary | ICD-10-CM

## 2023-04-03 DIAGNOSIS — E11.9 DIABETES MELLITUS TYPE 2, DIET-CONTROLLED (H): ICD-10-CM

## 2023-04-03 DIAGNOSIS — I50.9 ACUTE CONGESTIVE HEART FAILURE, UNSPECIFIED HEART FAILURE TYPE (H): ICD-10-CM

## 2023-04-03 DIAGNOSIS — J81.0 ACUTE PULMONARY EDEMA (H): ICD-10-CM

## 2023-04-03 DIAGNOSIS — R06.02 SHORTNESS OF BREATH: ICD-10-CM

## 2023-04-03 DIAGNOSIS — I47.20 VENTRICULAR TACHYCARDIA (H): ICD-10-CM

## 2023-04-03 DIAGNOSIS — I21.4 NSTEMI (NON-ST ELEVATED MYOCARDIAL INFARCTION) (H): ICD-10-CM

## 2023-04-03 LAB
ANION GAP SERPL CALCULATED.3IONS-SCNC: 16 MMOL/L (ref 7–15)
APTT PPP: 28 SECONDS (ref 22–38)
BASOPHILS # BLD AUTO: 0 10E3/UL (ref 0–0.2)
BASOPHILS NFR BLD AUTO: 0 %
BUN SERPL-MCNC: 52.7 MG/DL (ref 8–23)
CALCIUM SERPL-MCNC: 9 MG/DL (ref 8.8–10.2)
CHLORIDE SERPL-SCNC: 94 MMOL/L (ref 98–107)
CREAT SERPL-MCNC: 1.31 MG/DL (ref 0.67–1.17)
DEPRECATED HCO3 PLAS-SCNC: 23 MMOL/L (ref 22–29)
EOSINOPHIL # BLD AUTO: 0 10E3/UL (ref 0–0.7)
EOSINOPHIL NFR BLD AUTO: 0 %
ERYTHROCYTE [DISTWIDTH] IN BLOOD BY AUTOMATED COUNT: 15.8 % (ref 10–15)
GFR SERPL CREATININE-BSD FRML MDRD: 62 ML/MIN/1.73M2
GLUCOSE SERPL-MCNC: 158 MG/DL (ref 70–99)
HCT VFR BLD AUTO: 51.4 % (ref 40–53)
HGB BLD-MCNC: 17.4 G/DL (ref 13.3–17.7)
HOLD SPECIMEN: NORMAL
IMM GRANULOCYTES # BLD: 0.1 10E3/UL
IMM GRANULOCYTES NFR BLD: 0 %
INR PPP: 1.62 (ref 0.85–1.15)
LVEF ECHO: NORMAL
LYMPHOCYTES # BLD AUTO: 1.8 10E3/UL (ref 0.8–5.3)
LYMPHOCYTES NFR BLD AUTO: 15 %
MCH RBC QN AUTO: 29.1 PG (ref 26.5–33)
MCHC RBC AUTO-ENTMCNC: 33.9 G/DL (ref 31.5–36.5)
MCV RBC AUTO: 86 FL (ref 78–100)
MONOCYTES # BLD AUTO: 1.3 10E3/UL (ref 0–1.3)
MONOCYTES NFR BLD AUTO: 11 %
NEUTROPHILS # BLD AUTO: 8.6 10E3/UL (ref 1.6–8.3)
NEUTROPHILS NFR BLD AUTO: 74 %
NRBC # BLD AUTO: 0.1 10E3/UL
NRBC BLD AUTO-RTO: 1 /100
NT-PROBNP SERPL-MCNC: 4061 PG/ML (ref 0–900)
PLATELET # BLD AUTO: 293 10E3/UL (ref 150–450)
POTASSIUM SERPL-SCNC: 4.5 MMOL/L (ref 3.4–5.3)
RBC # BLD AUTO: 5.97 10E6/UL (ref 4.4–5.9)
SODIUM SERPL-SCNC: 133 MMOL/L (ref 136–145)
TROPONIN T SERPL HS-MCNC: 54 NG/L
WBC # BLD AUTO: 11.7 10E3/UL (ref 4–11)

## 2023-04-03 PROCEDURE — 96375 TX/PRO/DX INJ NEW DRUG ADDON: CPT | Mod: XU | Performed by: EMERGENCY MEDICINE

## 2023-04-03 PROCEDURE — 82310 ASSAY OF CALCIUM: CPT | Performed by: EMERGENCY MEDICINE

## 2023-04-03 PROCEDURE — 258N000003 HC RX IP 258 OP 636: Performed by: EMERGENCY MEDICINE

## 2023-04-03 PROCEDURE — 93010 ELECTROCARDIOGRAM REPORT: CPT | Mod: 76 | Performed by: INTERNAL MEDICINE

## 2023-04-03 PROCEDURE — 85610 PROTHROMBIN TIME: CPT | Performed by: EMERGENCY MEDICINE

## 2023-04-03 PROCEDURE — 93005 ELECTROCARDIOGRAM TRACING: CPT | Mod: 76 | Performed by: EMERGENCY MEDICINE

## 2023-04-03 PROCEDURE — 84484 ASSAY OF TROPONIN QUANT: CPT | Performed by: EMERGENCY MEDICINE

## 2023-04-03 PROCEDURE — 85730 THROMBOPLASTIN TIME PARTIAL: CPT | Performed by: EMERGENCY MEDICINE

## 2023-04-03 PROCEDURE — 93000 ELECTROCARDIOGRAM COMPLETE: CPT | Performed by: INTERNAL MEDICINE

## 2023-04-03 PROCEDURE — 71045 X-RAY EXAM CHEST 1 VIEW: CPT

## 2023-04-03 PROCEDURE — 93306 TTE W/DOPPLER COMPLETE: CPT

## 2023-04-03 PROCEDURE — 250N000011 HC RX IP 250 OP 636: Performed by: EMERGENCY MEDICINE

## 2023-04-03 PROCEDURE — 93005 ELECTROCARDIOGRAM TRACING: CPT | Performed by: EMERGENCY MEDICINE

## 2023-04-03 PROCEDURE — 93306 TTE W/DOPPLER COMPLETE: CPT | Mod: 26 | Performed by: STUDENT IN AN ORGANIZED HEALTH CARE EDUCATION/TRAINING PROGRAM

## 2023-04-03 PROCEDURE — 99291 CRITICAL CARE FIRST HOUR: CPT | Mod: 25 | Performed by: EMERGENCY MEDICINE

## 2023-04-03 PROCEDURE — 96374 THER/PROPH/DIAG INJ IV PUSH: CPT | Performed by: EMERGENCY MEDICINE

## 2023-04-03 PROCEDURE — 85004 AUTOMATED DIFF WBC COUNT: CPT | Performed by: EMERGENCY MEDICINE

## 2023-04-03 PROCEDURE — 83880 ASSAY OF NATRIURETIC PEPTIDE: CPT | Performed by: EMERGENCY MEDICINE

## 2023-04-03 PROCEDURE — 250N000013 HC RX MED GY IP 250 OP 250 PS 637: Performed by: EMERGENCY MEDICINE

## 2023-04-03 PROCEDURE — 99215 OFFICE O/P EST HI 40 MIN: CPT | Performed by: INTERNAL MEDICINE

## 2023-04-03 PROCEDURE — 99285 EMERGENCY DEPT VISIT HI MDM: CPT | Performed by: EMERGENCY MEDICINE

## 2023-04-03 RX ORDER — ASPIRIN 81 MG/1
324 TABLET, CHEWABLE ORAL ONCE
Status: COMPLETED | OUTPATIENT
Start: 2023-04-03 | End: 2023-04-03

## 2023-04-03 RX ORDER — HEPARIN SODIUM 5000 [USP'U]/.5ML
4000 INJECTION, SOLUTION INTRAVENOUS; SUBCUTANEOUS ONCE
Status: COMPLETED | OUTPATIENT
Start: 2023-04-03 | End: 2023-04-03

## 2023-04-03 RX ORDER — ADENOSINE 3 MG/ML
6 INJECTION, SOLUTION INTRAVENOUS ONCE
Status: DISCONTINUED | OUTPATIENT
Start: 2023-04-03 | End: 2023-04-03 | Stop reason: HOSPADM

## 2023-04-03 RX ORDER — SODIUM CHLORIDE 9 MG/ML
INJECTION, SOLUTION INTRAVENOUS CONTINUOUS
Status: DISCONTINUED | OUTPATIENT
Start: 2023-04-03 | End: 2023-04-03 | Stop reason: HOSPADM

## 2023-04-03 RX ADMIN — AMIODARONE HYDROCHLORIDE 1 MG/MIN: 1.8 INJECTION, SOLUTION INTRAVENOUS at 10:59

## 2023-04-03 RX ADMIN — HEPARIN SODIUM 4000 UNITS: 10000 INJECTION, SOLUTION INTRAVENOUS; SUBCUTANEOUS at 11:11

## 2023-04-03 RX ADMIN — TICAGRELOR 180 MG: 90 TABLET ORAL at 11:10

## 2023-04-03 RX ADMIN — AMIODARONE HYDROCHLORIDE 150 MG: 1.5 INJECTION, SOLUTION INTRAVENOUS at 10:52

## 2023-04-03 RX ADMIN — SODIUM CHLORIDE: 9 INJECTION, SOLUTION INTRAVENOUS at 11:09

## 2023-04-03 RX ADMIN — ASPIRIN 81 MG CHEWABLE TABLET 324 MG: 81 TABLET CHEWABLE at 11:10

## 2023-04-03 ASSESSMENT — ASTHMA QUESTIONNAIRES
QUESTION_4 LAST FOUR WEEKS HOW OFTEN HAVE YOU USED YOUR RESCUE INHALER OR NEBULIZER MEDICATION (SUCH AS ALBUTEROL): ONCE A WEEK OR LESS
ACT_TOTALSCORE: 10
QUESTION_1 LAST FOUR WEEKS HOW MUCH OF THE TIME DID YOUR ASTHMA KEEP YOU FROM GETTING AS MUCH DONE AT WORK, SCHOOL OR AT HOME: MOST OF THE TIME
QUESTION_3 LAST FOUR WEEKS HOW OFTEN DID YOUR ASTHMA SYMPTOMS (WHEEZING, COUGHING, SHORTNESS OF BREATH, CHEST TIGHTNESS OR PAIN) WAKE YOU UP AT NIGHT OR EARLIER THAN USUAL IN THE MORNING: FOUR OR MORE NIGHTS A WEEK
QUESTION_2 LAST FOUR WEEKS HOW OFTEN HAVE YOU HAD SHORTNESS OF BREATH: MORE THAN ONCE A DAY
QUESTION_5 LAST FOUR WEEKS HOW WOULD YOU RATE YOUR ASTHMA CONTROL: POORLY CONTROLLED
ACT_TOTALSCORE: 10

## 2023-04-03 ASSESSMENT — PATIENT HEALTH QUESTIONNAIRE - PHQ9
10. IF YOU CHECKED OFF ANY PROBLEMS, HOW DIFFICULT HAVE THESE PROBLEMS MADE IT FOR YOU TO DO YOUR WORK, TAKE CARE OF THINGS AT HOME, OR GET ALONG WITH OTHER PEOPLE: EXTREMELY DIFFICULT
SUM OF ALL RESPONSES TO PHQ QUESTIONS 1-9: 14
SUM OF ALL RESPONSES TO PHQ QUESTIONS 1-9: 14

## 2023-04-03 ASSESSMENT — ACTIVITIES OF DAILY LIVING (ADL): ADLS_ACUITY_SCORE: 35

## 2023-04-03 ASSESSMENT — PAIN SCALES - GENERAL: PAINLEVEL: MILD PAIN (3)

## 2023-04-03 NOTE — NURSING NOTE
"Chief Complaint   Patient presents with     Shortness of Breath     Fatigue         Initial BP (!) 87/52   Pulse 73   Temp (!) 96.5  F (35.8  C) (Tympanic)   Resp 20   Wt 102.7 kg (226 lb 8 oz)   SpO2 91%   BMI 33.45 kg/m   Estimated body mass index is 33.45 kg/m  as calculated from the following:    Height as of 3/27/23: 1.753 m (5' 9\").    Weight as of this encounter: 102.7 kg (226 lb 8 oz).         Norma J. Gosselin, LPN   "

## 2023-04-03 NOTE — ED TRIAGE NOTES
Pt brought over from with clinic with c/o shortness of breath. Pt found to be in wide complex tachycardia. BP (!) 114/97   Pulse (!) 186   Resp 20   SpO2 96%        Triage Assessment     Row Name 04/03/23 1032       Triage Assessment (Adult)    Airway WDL WDL       Respiratory WDL    Respiratory WDL X;rhythm/pattern    Rhythm/Pattern, Respiratory dyspnea on exertion       Skin Circulation/Temperature WDL    Skin Circulation/Temperature WDL WDL       Cardiac WDL    Cardiac WDL X;rhythm    Pulse Rate & Regularity tachycardic       Peripheral/Neurovascular WDL    Peripheral Neurovascular WDL WDL       Cognitive/Neuro/Behavioral WDL    Cognitive/Neuro/Behavioral WDL WDL

## 2023-04-03 NOTE — PROGRESS NOTES
Assessment & Plan       ICD-10-CM    1. Wide-complex tachycardia  R00.0       2. Shortness of breath  R06.02       3. Acute congestive heart failure, unspecified heart failure type (H)  I50.9       4. Diabetes mellitus type 2, diet-controlled (H)  E11.9       5. Left sided abdominal pain  R10.9           Mr. Madrid is a 63-year-old male with newly diagnosed diabetes mellitus type 2 presenting with 2 weeks of shortness of breath associated with hypothermia, hypotension and tachycardia.  Nursing vital signs indicate a pulse of 73 however this is inaccurate.  EKG obtained revealing heart rate of 190 with a wide-complex tachycardia.  I am concerned about the possibility of underlying acute coronary syndrome however differential diagnosis also includes atrial fibrillation with rapid ventricular response, undiagnosed Guera-Parkinson-White syndrome, organophosphate toxicity, severe sepsis, valvular disease, others.  I am concerned that his left-sided abdominal pain is secondary to cardiac in nature however differential diagnosis also includes pleural effusion, urinary retention, diverticulitis, pyelonephritis, others.  After obtaining an EKG the patient was brought to the emergency department.  The case was discussed with local emergency room physician and nursing staff.    Signed, Adin Ruano MD, FAAP, FACP  Internal Medicine & Pediatrics    Subjective   Adin Madrid is a 63 year old male who presents for feeling short of breath and tired.  He has been feeling really sick for the last 2 weeks.  He saw Dr. Rojas and had just darted to feel little short of breath.  He thought maybe it was his asthma acting up.  He was prescribed an inhaler.  He never started taking that because he was worried about thrush.  Since then his symptoms have worsened.  He thought if he could drink a lot of water maybe it would just go away.  Now he feels short of breath with any exertion.  He has a discomfort or cramping sensation across the  left hemiabdomen.  He has had some wheezing.  He feels cold.  No orthopnea.  He is not sure if he has swelling.  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    Objective   Vitals: BP (!) 87/52   Pulse 73   Temp (!) 96.5  F (35.8  C) (Tympanic)   Resp 20   Wt 102.7 kg (226 lb 8 oz)   SpO2 91%   BMI 33.45 kg/m      Cardiovascular: Tachycardic, regular.  Estimated heart rate 190.  Pulmonary: Sentences frequently interrupted by needing to breathe.    Review and Analysis of Data   I personally reviewed the following:  External notes: No  Results: Yes Most recent lab work is reviewed  Use of an independent historian: No  Independent review of a test performed by another physician: No  Discussion of management with another physician: No  High risk of morbidity from additional diagnostic testing and/or treatment.

## 2023-04-03 NOTE — ED PROVIDER NOTES
[facility]  Emergency Department Visit Note    Palpitations      History of Present Illness     HPI:  Adin Madrid is a 63 year old male with h/o DM2 presenting from clinic with SOB and heart rate in the 180s.  Patient presented to clinic for 2 weeks of increasing shortness of breath.  Patient has no prior cardiac or pulmonary but states that he now gets winded just walking across the room.  He does admit to having intermittent chest pain with exertion that is relieved with rest.  This he states has been going on off and on for several months.   Medications:  Prior to Admission medications    Medication Sig Last Dose Taking? Auth Provider Long Term End Date   Alcohol Swabs (ALCOHOL PREP) 70 % PADS USE TO SWAB AREA OF INJECTIONS/LANCING AS DIRECTED   Juan Rojas MD     blood glucose (NO BRAND SPECIFIED) test strip Use to test blood sugar 3 times daily or as directed. To accompany: Blood Glucose Monitor Brands: per insurance.   Juna Rojas MD     blood glucose calibration (NO BRAND SPECIFIED) solution To accompany: Blood Glucose Monitor Brands: per insurance.   Juan Rojas MD     blood glucose monitoring (NO BRAND SPECIFIED) meter device kit Use to test blood sugar 3 times daily or as directed. Preferred blood glucose meter OR supplies to accompany: Blood Glucose Monitor Brands: per insurance.   Juan Rojas MD     budesonide-formoterol (SYMBICORT) 160-4.5 MCG/ACT Inhaler Inhale 2 puffs into the lungs 2 times daily   Juan Rojas MD Yes    buPROPion (WELLBUTRIN XL) 150 MG 24 hr tablet Take 1 tablet (150 mg) by mouth every morning   Juan Rojas MD Yes    diphenhydrAMINE-acetaminophen (TYLENOL PM)  MG tablet Take 0.5 tablets by mouth nightly as needed for sleep   Reported, Patient     etodolac (LODINE) 500 MG tablet TAKE 1 TABLET BY MOUTH TWICE DAILY WITH MEALS   Juan Rojas MD Yes    metFORMIN (GLUCOPHAGE XR) 500 MG 24 hr tablet Take 1 tablet (500 mg)  "by mouth daily (with dinner)   Juan Rojas MD Yes    PARoxetine (PAXIL) 40 MG tablet Take 0.5 tablets (20 mg) by mouth daily   Juan Rojas MD Yes    rosuvastatin (CRESTOR) 5 MG tablet Take 1 tablet (5 mg) by mouth daily   Juan Rojas MD Yes    thin (NO BRAND SPECIFIED) lancets Use with lanceting device. To accompany: Blood Glucose Monitor Brands: per insurance.   Juan Rojas MD         Allergies:  No Known Allergies    Problem List:  Patient Active Problem List   Diagnosis     Jammed finger (interphalangeal joint), initial encounter     Anxiety     Chronic low back pain     Chronic neck pain     Dermatofibroma     Diabetes mellitus, type 2 (H)       Past Medical History:  Past Medical History:   Diagnosis Date     Encounter for general adult medical examination without abnormal findings     9/20/04,Satisfactory     Gastro-esophageal reflux disease without esophagitis     resolved     Metatarsalgia     resolved.     Strain of muscle and tendon of unspecified wall of thorax, initial encounter     No Comments Provided       Past Surgical History:  Past Surgical History:   Procedure Laterality Date     COLONOSCOPY  01/08/2014 1/2014,Melanosis coli - normal; follow up 10 years     VASECTOMY      01/06       Social History:  Social History     Tobacco Use     Smoking status: Never     Smokeless tobacco: Never   Vaping Use     Vaping status: Never Used   Substance Use Topics     Alcohol use: No     Drug use: No     Comment: Drug use: No       Review of Systems:  10 point review of systems obtained and pertinent positive and negative findings noted in HPI. Review of systems otherwise negative.      Physical Exam     Vital signs: BP (!) 118/103   Pulse (!) 135   Temp (!) 96.3  F (35.7  C) (Tympanic)   Resp 21   Ht 1.778 m (5' 10\")   Wt 102.5 kg (226 lb)   SpO2 95%   BMI 32.43 kg/m      Physical Exam:    General: awake and alert, comfortable  HEENT: atraumatic, no scleral injection, no nasal " discharge, neck supple  Chest: bibasilar crackles, non labored respirations  Cardiovascular: regular, tachycardic, no murmurs or gallops  Abdomen: soft, nontender, no rebound or guarding, nondistended  Extremities: no deformities, edema, or tenderness  Skin: diaphoretic  Neuro: alert and oriented x 3, moving extremities x 4, ambulates without difficulty      Medical Decision Making & ED Course     Adin Madrid is a 63 year old male presenting with SOB and rapids heart rate.  EKG from clinic is concerning for V. tach versus SVT with aberrancy.  Upon review with cardiology, Dr Jimenez,  I do believe that this is most likely V. Tach given morphology of aVR and V6 as well as LAD.  Inferior Q waves are also concerning for recent or MI.  Unfortunately he has no prior ECGs for comparison  Differential includes acute coronary syndrome, pulmonary embolism, aortic dissection, pneumonia, esophageal spasm, reactive airway disease.. Concern for a life threatening etiology of symptoms prompts EKG, CXR, and laboratory evaluation.  He was given amiodarone 150 mg bolus followed by drip.  His heart rate did come down and he is currently at a heart rate of 130 in atrial fibrillation.  There is no evidence of left axis deviation and he does have a narrow QRS complex.  After discussion with Dr. Jimenez he will be started on STEMI protocol and transferred promptly to the emergency department.  A stat echo was obtained which is concerning for inferior wall motion abnormality although this is difficult to assess given his heart rate 160 at the time.  He also has evidence of EF of about 15% but again this is limited by heart rate.  Given aspirin, brilinta, heparin.  He is continued on the amiodarone drip the case discussed with the ED physician on call at [facility] who agrees to admit the patient.    I have reviewed the patient's ECG(s), Lab(s), Medical Imaging and Medical Records.    Diagnosis & Disposition     Diagnosis:  1. Ventricular tachycardia  (H)    2. Acute pulmonary edema (H)    3. NSTEMI (non-ST elevated myocardial infarction) (H)          Disposition:  Transfer to [facility]    MD Sage Alves Theresa M, MD  04/03/23 1141       Bushra Cazares MD  04/03/23 8831

## 2023-04-04 LAB
ATRIAL RATE - MUSE: 182 BPM
DIASTOLIC BLOOD PRESSURE - MUSE: NORMAL MMHG
INTERPRETATION ECG - MUSE: NORMAL
P AXIS - MUSE: NORMAL DEGREES
PR INTERVAL - MUSE: NORMAL MS
QRS DURATION - MUSE: 122 MS
QT - MUSE: 272 MS
QTC - MUSE: 478 MS
R AXIS - MUSE: -77 DEGREES
SYSTOLIC BLOOD PRESSURE - MUSE: NORMAL MMHG
T AXIS - MUSE: 97 DEGREES
VENTRICULAR RATE- MUSE: 186 BPM

## 2023-04-06 LAB
ATRIAL RATE - MUSE: 119 BPM
ATRIAL RATE - MUSE: 192 BPM
DIASTOLIC BLOOD PRESSURE - MUSE: NORMAL MMHG
DIASTOLIC BLOOD PRESSURE - MUSE: NORMAL MMHG
INTERPRETATION ECG - MUSE: NORMAL
INTERPRETATION ECG - MUSE: NORMAL
P AXIS - MUSE: NORMAL DEGREES
P AXIS - MUSE: NORMAL DEGREES
PR INTERVAL - MUSE: NORMAL MS
PR INTERVAL - MUSE: NORMAL MS
QRS DURATION - MUSE: 132 MS
QRS DURATION - MUSE: 88 MS
QT - MUSE: 272 MS
QT - MUSE: 342 MS
QTC - MUSE: 476 MS
QTC - MUSE: 510 MS
R AXIS - MUSE: -31 DEGREES
R AXIS - MUSE: -78 DEGREES
SYSTOLIC BLOOD PRESSURE - MUSE: NORMAL MMHG
SYSTOLIC BLOOD PRESSURE - MUSE: NORMAL MMHG
T AXIS - MUSE: -81 DEGREES
T AXIS - MUSE: 96 DEGREES
VENTRICULAR RATE- MUSE: 134 BPM
VENTRICULAR RATE- MUSE: 184 BPM

## 2023-04-13 ENCOUNTER — OFFICE VISIT (OUTPATIENT)
Dept: FAMILY MEDICINE | Facility: OTHER | Age: 63
End: 2023-04-13
Attending: PHYSICIAN ASSISTANT
Payer: COMMERCIAL

## 2023-04-13 VITALS
RESPIRATION RATE: 20 BRPM | DIASTOLIC BLOOD PRESSURE: 68 MMHG | HEART RATE: 46 BPM | TEMPERATURE: 97 F | BODY MASS INDEX: 28.41 KG/M2 | WEIGHT: 198 LBS | SYSTOLIC BLOOD PRESSURE: 98 MMHG | OXYGEN SATURATION: 96 %

## 2023-04-13 DIAGNOSIS — E11.9 TYPE 2 DIABETES MELLITUS WITHOUT COMPLICATION, WITHOUT LONG-TERM CURRENT USE OF INSULIN (H): ICD-10-CM

## 2023-04-13 DIAGNOSIS — Z23 NEED FOR SHINGLES VACCINE: ICD-10-CM

## 2023-04-13 DIAGNOSIS — I50.21 ACUTE SYSTOLIC CONGESTIVE HEART FAILURE (H): ICD-10-CM

## 2023-04-13 DIAGNOSIS — Z23 NEED FOR VACCINATION FOR PNEUMOCOCCUS: ICD-10-CM

## 2023-04-13 DIAGNOSIS — I51.3 THROMBUS OF LEFT ATRIAL APPENDAGE: ICD-10-CM

## 2023-04-13 DIAGNOSIS — Z09 HOSPITAL DISCHARGE FOLLOW-UP: Primary | ICD-10-CM

## 2023-04-13 PROBLEM — E66.811 OBESITY (BMI 30.0-34.9): Status: ACTIVE | Noted: 2023-04-03

## 2023-04-13 PROBLEM — G47.33 OSA ON CPAP: Status: ACTIVE | Noted: 2023-04-03

## 2023-04-13 PROCEDURE — 99213 OFFICE O/P EST LOW 20 MIN: CPT | Mod: 25 | Performed by: PHYSICIAN ASSISTANT

## 2023-04-13 PROCEDURE — 90677 PCV20 VACCINE IM: CPT | Performed by: PHYSICIAN ASSISTANT

## 2023-04-13 PROCEDURE — 90471 IMMUNIZATION ADMIN: CPT | Performed by: PHYSICIAN ASSISTANT

## 2023-04-13 RX ORDER — FUROSEMIDE 40 MG
20 TABLET ORAL
COMMUNITY
Start: 2023-04-11 | End: 2023-04-18

## 2023-04-13 RX ORDER — EMPAGLIFLOZIN 10 MG/1
TABLET, FILM COATED ORAL
COMMUNITY
Start: 2023-04-06 | End: 2023-04-18

## 2023-04-13 RX ORDER — APIXABAN 5 MG/1
TABLET, FILM COATED ORAL
COMMUNITY
Start: 2023-04-06 | End: 2023-04-18

## 2023-04-13 RX ORDER — METOPROLOL SUCCINATE 100 MG/1
TABLET, EXTENDED RELEASE ORAL
COMMUNITY
Start: 2023-04-06 | End: 2023-04-18

## 2023-04-13 RX ORDER — ACETAMINOPHEN 325 MG/1
650 TABLET ORAL
COMMUNITY
Start: 2023-04-06

## 2023-04-13 ASSESSMENT — PATIENT HEALTH QUESTIONNAIRE - PHQ9
SUM OF ALL RESPONSES TO PHQ QUESTIONS 1-9: 9
SUM OF ALL RESPONSES TO PHQ QUESTIONS 1-9: 9
10. IF YOU CHECKED OFF ANY PROBLEMS, HOW DIFFICULT HAVE THESE PROBLEMS MADE IT FOR YOU TO DO YOUR WORK, TAKE CARE OF THINGS AT HOME, OR GET ALONG WITH OTHER PEOPLE: SOMEWHAT DIFFICULT

## 2023-04-13 ASSESSMENT — PAIN SCALES - GENERAL: PAINLEVEL: SEVERE PAIN (7)

## 2023-04-13 NOTE — LETTER
My Asthma Action Plan    Name: Adin Madrid   YOB: 1960  Date: 4/13/2023   My doctor: Malgorzata Cheung PA-C   My clinic: [facility]        My Rescue Medicine:   Albuterol inhaler (Proair/Ventolin/Proventil HFA)  2-4 puffs EVERY 4 HOURS as needed. Use a spacer if recommended by your provider.   My Asthma Severity:   Intermittent / Exercise Induced  Know your asthma triggers: Patient is unaware of triggers             GREEN ZONE   Good Control  I feel good  No cough or wheeze  Can work, sleep and play without asthma symptoms       Take your asthma control medicine every day.     If exercise triggers your asthma, take your rescue medication  15 minutes before exercise or sports, and  During exercise if you have asthma symptoms  Spacer to use with inhaler: If you have a spacer, make sure to use it with your inhaler             YELLOW ZONE Getting Worse  I have ANY of these:  I do not feel good  Cough or wheeze  Chest feels tight  Wake up at night   Keep taking your Green Zone medications  Start taking your rescue medicine:  every 20 minutes for up to 1 hour. Then every 4 hours for 24-48 hours.  If you stay in the Yellow Zone for more than 12-24 hours, contact your doctor.  If you do not return to the Green Zone in 12-24 hours or you get worse, start taking your oral steroid medicine if prescribed by your provider.           RED ZONE Medical Alert - Get Help  I have ANY of these:  I feel awful  Medicine is not helping  Breathing getting harder  Trouble walking or talking  Nose opens wide to breathe       Take your rescue medicine NOW  If your provider has prescribed an oral steroid medicine, start taking it NOW  Call your doctor NOW  If you are still in the Red Zone after 20 minutes and you have not reached your doctor:  Take your rescue medicine again and  Call 911 or go to the emergency room right away    See your regular doctor within 2 weeks of an Emergency Room or Urgent Care visit  for follow-up treatment.          Annual Reminders:  Meet with Asthma Educator,  Flu Shot in the Fall, consider Pneumonia Vaccination for patients with asthma (aged 19 and older).    Pharmacy: [id number] - [address]    Electronically signed by Malgorzata Cheung PA-C   Date: 04/13/23                    Asthma Triggers  How To Control Things That Make Your Asthma Worse    Triggers are things that make your asthma worse.  Look at the list below to help you find your triggers and   what you can do about them. You can help prevent asthma flare-ups by staying away from your triggers.      Trigger                                                          What you can do   Cigarette Smoke  Tobacco smoke can make asthma worse. Do not allow smoking in your home, car or around you.  Be sure no one smokes at a child s day care or school.  If you smoke, ask your health care provider for ways to help you quit.  Ask family members to quit too.  Ask your health care provider for a referral to Quit Plan to help you quit smoking, or call 7-249-693-PLAN.     Colds, Flu, Bronchitis  These are common triggers of asthma. Wash your hands often.  Don t touch your eyes, nose or mouth.  Get a flu shot every year.     Dust Mites  These are tiny bugs that live in cloth or carpet. They are too small to see. Wash sheets and blankets in hot water every week.   Encase pillows and mattress in dust mite proof covers.  Avoid having carpet if you can. If you have carpet, vacuum weekly.   Use a dust mask and HEPA vacuum.   Pollen and Outdoor Mold  Some people are allergic to trees, grass, or weed pollen, or molds. Try to keep your windows closed.  Limit time out doors when pollen count is high.   Ask you health care provider about taking medicine during allergy season.     Animal Dander  Some people are allergic to skin flakes, urine or saliva from pets with fur or feathers. Keep pets with fur  or feathers out of your home.    If you can t keep the pet outdoors, then keep the pet out of your bedroom.  Keep the bedroom door closed.  Keep pets off cloth furniture and away from stuffed toys.     Mice, Rats, and Cockroaches  Some people are allergic to the waste from these pests.   Cover food and garbage.  Clean up spills and food crumbs.  Store grease in the refrigerator.   Keep food out of the bedroom.   Indoor Mold  This can be a trigger if your home has high moisture. Fix leaking faucets, pipes, or other sources of water.   Clean moldy surfaces.  Dehumidify basement if it is damp and smelly.   Smoke, Strong Odors, and Sprays  These can reduce air quality. Stay away from strong odors and sprays, such as perfume, powder, hair spray, paints, smoke incense, paint, cleaning products, candles and new carpet.   Exercise or Sports  Some people with asthma have this trigger. Be active!  Ask your doctor about taking medicine before sports or exercise to prevent symptoms.    Warm up for 5-10 minutes before and after sports or exercise.     Other Triggers of Asthma  Cold air:  Cover your nose and mouth with a scarf.  Sometimes laughing or crying can be a trigger.  Some medicines and food can trigger asthma.

## 2023-04-13 NOTE — NURSING NOTE
Pt presents to clinic today for a hospital follow up.   Has questions on tylenol dosing for neck and back pain./     FOOD SECURITY SCREENING QUESTIONS:    The next two questions are to help us understand your food security.  If you are feeling you need any assistance in this area, we have resources available to support you today.    Hunger Vital Signs:  Within the past 12 months we worried whether our food would run out before we got money to buy more. Never  Within the past 12 months the food we bought just didn't last and we didn't have money to get more. Never            Medication Reconciliation: complete  Miguel Angel Quigley LPN,LPN on 4/13/2023 at 11:02 AM

## 2023-04-13 NOTE — LETTER
My Heart Failure Action Plan  Name: Adin Madrid   YOB: 1960  Date: 4/13/2023   My doctor:   [facility]   [address]   [place] [address] [phone] My Diagnosis: HF-rEF (EF <40)  My Ejection Fraction:   Lab Results   Component Value Date    LVEF 15-20% (severely reduced) 04/03/2023     25% - 29%  My Exercise Goal: Start exercise slowly - to begin, do a few minutes of exercise, several times a day. Increase your time and speed [id number] to build tolerance, with a goal of 30 minutes of exercise daily. Steady, slow, and consistent exercise is both safe and healthy. Stop and rest when you feel tired or become short of breath. Do not push yourself on days when you don t feel well.       My Weight Plan:   Wt Readings from Last 2 Encounters:   04/13/23 89.8 kg (198 lb)   04/03/23 102.5 kg (226 lb)     Weigh yourself daily using the same scale. If you gain more than 2 pounds in 24 hours or 5 pounds in a week call the clinic    My Diet Goal: No added salt    Emergency Room Visits:    Our goal is to improve your quality of life and help you avoid a visit to the emergency room or hospital.  If we work together, we can achieve this goal. But, if you feel you need to call 911 or go to the emergency room, please do so.  If you go to the emergency room, please bring your list of medicines and your daily weight chart with you.       GREEN ZONE     Doing well today  Weight gained is no more than 2 pounds a day or 5 pounds a week.  No swelling in feet, ankles, legs or stomach.  No more swelling than usual.  No more trouble breathing than usual.  No change in my sleep.  No other problems. Actions:  I am doing fine. I will take my medicine, follow my diet, see my doctor, exercise, and watch for symptoms.           YELLOW ZONE         Having a bad day or flare up  Weight gain of more than 2 pounds in one day or 5 pounds in one week.  New swelling in  ankle, leg, knee or thigh.  Bloating in belly, pants feel tighter.  Swelling in hands or face.  Coughing or trouble breathing while walking or talking.  Harder to breathe last night.  Have trouble sleeping, wake up short of breath.  Much more tired than usual.  Not eating.  Nausea, vomiting, or diarrhea  Pain in my chest or bad  leg cramps.  Feel weak or dizzy. Actions:  I need to take action and call my doctor or nurse today.                 RED ZONE         Need medical care now  Weight gain of 5 pounds overnight.  Chest pain or pressure that does not go away.  Feel less alert.  Wheezing or have trouble breathing when at rest.  Cannot sleep lying down.  Cannot take my water pill.  Pass out or faint. Actions:  I need to call my doctor or nurse now!  Call 911 if I have chest pain or cannot breathe.

## 2023-04-13 NOTE — PROGRESS NOTES
Assessment & Plan   Problem List Items Addressed This Visit        Endocrine    Diabetes mellitus, type 2 (H)    Relevant Medications    JARDIANCE 10 MG TABS tablet    Other Relevant Orders    FOOT EXAM (Completed)       Circulatory    Thrombus of left atrial appendage    Acute systolic congestive heart failure (H)    Relevant Orders    HEART FAILURE ACTION PLAN (Completed)   Other Visit Diagnoses     Hospital discharge follow-up    -  Primary    Need for vaccination for pneumococcus        Relevant Orders    PNEUMOCOCCAL 20 VALENT CONJUGATE (PREVNAR 20) (Completed)    Need for shingles vaccine        Relevant Orders    GH IMM - ZOSTER VACCINE RECOMBINANT ADJUVANTED IM NJX (SHINGRIX)         Patient was placed on shingrix vaccine waiting list.    Gave Prevnar 20 vaccine for pneumonia prevention.    Acute systolic congestive heart failure: Patient is currently stable.  Gave warning signs and symptoms.  Completed heart failure action plan.  Continue to follow with cardiology.  Encouraged follow-up with PCP as needed.  No acute concerns at this time.  Up-to-date on lab work.    Type 2 diabetes mellitus: Stable foot exam today.  Continue medications as previously prescribed.  Encourage good diet and exercise.  Continue to follow with PCP.  Can place a diabetic education referral if preferred.      30 minutes spent by me on the date of the encounter doing chart review, history and exam, documentation and further activities per the note     MED REC REQUIRED  Post Medication Reconciliation Status:  Discharge medications reconciled, continue medications without change    See Patient Instructions    No follow-ups on file.    Malgorzata Cheung PA-C  [facility]    Jono Oakley is a 63 year old, presenting for the following health issues:  Hospital F/U        4/13/2023    10:56 AM   Additional Questions   Roomed by alba   Accompanied by self     HPI         Hospital Follow-up  Visit:    Hospital/Nursing Home/ Rehab Facility: [facility]  Date of Admission: 4/3/23  Date of Discharge: 4/6/23  Reason(s) for Admission: Atrial fibrillation, acute coronary syndrome with high troponin, ventricular tachycardia, acute systolic congestive heart failure    Was your hospitalization related to COVID-19? No   Problems taking medications regularly:  None  Medication changes since discharge: multiple   Problems adhering to non-medication therapy:  None    Summary of hospitalization:  CareEverywhere information obtained and reviewed  Diagnostic Tests/Treatments reviewed.  Follow up needed: none  Other Healthcare Providers Involved in Patient s Care:         Specialist appointment - cardiology  Update since discharge: improved.   Plan of care communicated with patient     Patient is coming today for follow-up.  Was admitted to the hospital with new onset atrial fibrillation, acute coronary syndrome with high troponin, ventricular tachycardia, and acute systolic congestive heart failure.  Patient was previously having shortness of breath and increased fatigue.  Heart rate was up to 180.  He is not feeling better.  Heart rate is currently between 50 and 90.  Occasionally up to 113.  Was not having chest pain previously.  None currently.  Shortness of breath is improving.  Working on taking it easy.  Increasing activity as tolerated.  Followed up with cardiology this week.  Blood pressure was found to be decreased.  Losartan was discontinued.  Patient has a follow-up with cardiology this next week.  Not lightheaded or dizzy.  Doing well with his medications.  No side effects noted.  No recent cough or cold symptoms.    Future Appointments   Date Time Provider Department Center   4/11/2023 11:40 AM Robin Dorman APRN, CNP [facility]   4/25/2023 3:30 PM Giorgio Moreno MBBS [facility]     Review of Systems   Constitutional, HEENT, cardiovascular, pulmonary, gi and gu systems are negative,  except as otherwise noted.      Objective    BP 98/68 (BP Location: Right arm, Patient Position: Sitting, Cuff Size: Adult Large)   Pulse (!) 46   Temp 97  F (36.1  C) (Tympanic)   Resp 20   Wt 89.8 kg (198 lb)   SpO2 96%   BMI 28.41 kg/m    Body mass index is 28.41 kg/m .  Physical Exam  Vitals and nursing note reviewed.   Constitutional:       Appearance: Normal appearance. He is well-developed.   HENT:      Head: Normocephalic.   Eyes:      Extraocular Movements: Extraocular movements intact.      Conjunctiva/sclera: Conjunctivae normal.      Pupils: Pupils are equal, round, and reactive to light.   Neck:      Thyroid: No thyromegaly.   Cardiovascular:      Rate and Rhythm: Normal rate and regular rhythm.      Heart sounds: Normal heart sounds. No murmur heard.  Pulmonary:      Effort: Pulmonary effort is normal. No respiratory distress.      Breath sounds: Normal breath sounds. No wheezing or rales.   Musculoskeletal:         General: Normal range of motion.      Cervical back: Normal range of motion and neck supple.   Lymphadenopathy:      Cervical: No cervical adenopathy.   Skin:     General: Skin is warm and dry.      Comments: No lesions on feet bilaterally.   Neurological:      Mental Status: He is alert and oriented to person, place, and time.      Comments: Diabetic foot exam:  Normal diabetic monofilament sensation exam bilaterally. No sores or calluses appreciated.    Psychiatric:         Mood and Affect: Mood normal.         Behavior: Behavior normal.                Answers for HPI/ROS submitted by the patient on 4/13/2023  If you checked off any problems, how difficult have these problems made it for you to do your work, take care of things at home, or get along with other people?: Somewhat difficult  PHQ9 TOTAL SCORE: 9

## 2023-04-15 ENCOUNTER — MYC MEDICAL ADVICE (OUTPATIENT)
Dept: FAMILY MEDICINE | Facility: OTHER | Age: 63
End: 2023-04-15
Payer: COMMERCIAL

## 2023-04-16 ENCOUNTER — MYC MEDICAL ADVICE (OUTPATIENT)
Dept: FAMILY MEDICINE | Facility: OTHER | Age: 63
End: 2023-04-16
Payer: COMMERCIAL

## 2023-04-17 NOTE — TELEPHONE ENCOUNTER
See concurrent Voaltet message with same info.  Sent to PCP Bob and Daja Cheung through tandem message.    Fariha Bull RN on 4/17/2023 at 10:28 AM

## 2023-04-18 RX ORDER — APIXABAN 5 MG/1
5 TABLET, FILM COATED ORAL 2 TIMES DAILY
COMMUNITY
Start: 2023-04-18 | End: 2024-07-18

## 2023-04-18 RX ORDER — EMPAGLIFLOZIN 10 MG/1
10 TABLET, FILM COATED ORAL DAILY
Qty: 90 TABLET | COMMUNITY
Start: 2023-04-18 | End: 2023-09-19

## 2023-04-18 RX ORDER — FUROSEMIDE 40 MG
20 TABLET ORAL DAILY
COMMUNITY
Start: 2023-04-18 | End: 2024-07-18

## 2023-04-18 RX ORDER — BUPROPION HYDROCHLORIDE 150 MG/1
150 TABLET ORAL EVERY MORNING
COMMUNITY
Start: 2023-04-18 | End: 2023-06-22

## 2023-04-18 RX ORDER — METOPROLOL SUCCINATE 100 MG/1
100 TABLET, EXTENDED RELEASE ORAL DAILY
COMMUNITY
Start: 2023-04-18 | End: 2023-09-19

## 2023-05-17 ENCOUNTER — THERAPY VISIT (OUTPATIENT)
Dept: CHIROPRACTIC MEDICINE | Facility: OTHER | Age: 63
End: 2023-05-17
Attending: CHIROPRACTOR
Payer: COMMERCIAL

## 2023-05-17 VITALS
HEART RATE: 58 BPM | RESPIRATION RATE: 17 BRPM | OXYGEN SATURATION: 98 % | SYSTOLIC BLOOD PRESSURE: 114 MMHG | TEMPERATURE: 98 F | DIASTOLIC BLOOD PRESSURE: 60 MMHG

## 2023-05-17 DIAGNOSIS — M54.50 CHRONIC LOW BACK PAIN, UNSPECIFIED BACK PAIN LATERALITY, UNSPECIFIED WHETHER SCIATICA PRESENT: ICD-10-CM

## 2023-05-17 DIAGNOSIS — M62.830 BACK MUSCLE SPASM: ICD-10-CM

## 2023-05-17 DIAGNOSIS — M99.02 SEGMENTAL AND SOMATIC DYSFUNCTION OF THORACIC REGION: Primary | ICD-10-CM

## 2023-05-17 DIAGNOSIS — M50.30 DEGENERATION OF CERVICAL INTERVERTEBRAL DISC: ICD-10-CM

## 2023-05-17 DIAGNOSIS — M99.04 SEGMENTAL AND SOMATIC DYSFUNCTION OF SACRAL REGION: ICD-10-CM

## 2023-05-17 DIAGNOSIS — G89.29 CHRONIC LOW BACK PAIN, UNSPECIFIED BACK PAIN LATERALITY, UNSPECIFIED WHETHER SCIATICA PRESENT: ICD-10-CM

## 2023-05-17 DIAGNOSIS — M62.838 NECK MUSCLE SPASM: ICD-10-CM

## 2023-05-17 PROCEDURE — 99212 OFFICE O/P EST SF 10 MIN: CPT | Mod: 25 | Performed by: CHIROPRACTOR

## 2023-05-17 PROCEDURE — 98940 CHIROPRACT MANJ 1-2 REGIONS: CPT | Mod: AT | Performed by: CHIROPRACTOR

## 2023-05-17 PROCEDURE — 97810 ACUP 1/> WO ESTIM 1ST 15 MIN: CPT | Performed by: CHIROPRACTOR

## 2023-05-17 NOTE — PROGRESS NOTES
Lefft neck feel constant, tight. 6/10 W24 8/10. Has tried heat, cold, ultrasounds and Tylenol, gives relief.  Lower left back feels constant, burning. 7/10 W24 8/10., Has tried heat cold ultrasound, and tylenol, gives relief.  Rema Gay on 5/17/2023 at 11:08 AM    Reviewed by EW    Visit #:  1  Re-assessment    Subjective:  Adin Madrid is a 63 year old male who is seen in f/u up for:        Segmental and somatic dysfunction of thoracic region  Segmental and somatic dysfunction of sacral region  Degeneration of cervical intervertebral disc  Neck muscle spasm  Back muscle spasm  Chronic low back pain, unspecified back pain laterality, unspecified whether sciatica present.     Since last visit on 2/13/2023,  Adin Madrid reports: Patient continues to have neck and back pain symptoms.  Recently patient experienced major cardiac event.  Since this patient has been resting from work which appears to be helping with neck and back pain.  No recent aggravations, radiculopathy of the upper or lower extremities, changes to bowel bladder function/habits, weakness of the upper or lower extremities.  Continues to use ultrasound, ice at home which does seem to provide some level of help.  Presents to our office at this time for chiropractic care and acupuncture as this has been beneficial in helping the symptoms in the past.        (DVPRS) Pain Rating Score : Focus of attention, prevents doing daily activities (W24 8) (05/17/23 1105)     Objective:  The following was observed:  /60 (BP Location: Right arm)   Pulse 58   Temp 98  F (36.7  C) (Tympanic)   Resp 17   SpO2 98%          5/17/2023    11:00 AM   Neck Disability Index (  Ahmet H. and Varun LIZ. 1991. All rights reserved.; used with permission)   SECTION 1 - PAIN INTENSITY 3   SECTION 2 - PERSONAL CARE 2   SECTION 3 - LIFTING 4   SECTION 4 - READING 2   SECTION 5 - HEADACHES 2   SECTION 6 - CONCENTRATION 1   SECTION 7 - WORK 3   SECTION 8 - DRIVING 3    SECTION 9 - SLEEPING 2   SECTION 10 - RECREATION 3   Count 10   Sum 25   Raw Score: /50 25   Neck Disability Index Score: (%) 50 %      Cervical AROM: Restrictions with left lateral flexion, left rotation moderate, rotations are noted on the right with rotation and lateral flexion to the right.    Oswestry (JOSELYN) Questionnaire        5/17/2023    11:12 AM   OSWESTRY DISABILITY INDEX   Count 10   Sum 24   Oswestry Score (%) 48 %      Thoracic/lumbar AROM: Mild restrictions with all active ranges of motion.    SLR:-right, -left    P: palpatory tendernessThoracic paraspinals, rhomboids bilaterally, cervical paraspinals primarily lower aspect on the left, left quadratus lumborum:    A: static palpation demonstrates intersegmental asymmetry , thoracic, pelvis  R: motion palpation notes restricted motion, T3 , T9  and Sacrum   T: muscle spasm at level(s): Lower cervical paraspinals on the left, thoracic paraspinals bilaterally, rhomboids bilaterally, quadratus lumborum on left, lumbar paraspinals bilaterally mild:      Segmental spinal dysfunction/restrictions found at:  :  T3 Left rotation restricted and Extension restriction  T9 Left rotation restricted and Extension restriction  Sacrum Left lateral flexion restricted and Extension restriction.      Assessment: Aggravation chronic neck and back concerns.  Patient has been under care with similar complaints and typically responds fairly with chiropractic and acupuncture intervention.  Due to recent heart concerns, and the fact the patient has not blood thinners acupuncture is concerning but not contraindicated.  However we will utilize fewer needles then we typically do with the patient.  Chiropractic assessment not suggestive of segmental/somatic dysfunction of the cervical spine.  Some concern of any cervical manipulative therapy at this time until patient has been cleared by medical provider due to concerns of A-fib, unclear at this time.    Diagnoses:      1.  Segmental and somatic dysfunction of thoracic region    2. Segmental and somatic dysfunction of sacral region    3. Degeneration of cervical intervertebral disc    4. Neck muscle spasm    5. Back muscle spasm    6. Chronic low back pain, unspecified back pain laterality, unspecified whether sciatica present        Patient's condition:  Patient had restrictions pre-manipulation    Treatment effectiveness:  Post manipulation there is better intersegmental movement and Patient claims to feel looser post manipulation      Procedures:  E/M 14927    CMT:  36796 Chiropractic manipulative treatment 1-2 regions performed   Thoracic: Diversified, T3, T9, Prone  Pelvic: Diversified, sacrum, Side posture    Modalities:  40829: MSTM:  To Lower cervical paraspinals:   for 3 min  76193: Acupuncture, for 15 minutes:  Points: Bl 10, 13, 20, 23, 24, 25, 46, 47  For 15 minutes    Therapeutic procedures:  None    Response to Treatment  Reduction in symptoms as reported by patient    Prognosis: Good    Progress towards Goals: Reduce pain symptoms 50%  Improve spinal AROM  Improve disability indices by 20-30%    Recommendations:    Instructions:ice 20 minutes every other hour as needed and heat 15 minutes every other hour as needed    Follow-up:  Return to care if symptoms persist.

## 2023-05-30 ENCOUNTER — VIRTUAL VISIT (OUTPATIENT)
Dept: EDUCATION SERVICES | Facility: OTHER | Age: 63
End: 2023-05-30
Attending: FAMILY MEDICINE
Payer: COMMERCIAL

## 2023-05-30 DIAGNOSIS — E11.9 TYPE 2 DIABETES MELLITUS WITHOUT COMPLICATION, WITHOUT LONG-TERM CURRENT USE OF INSULIN (H): ICD-10-CM

## 2023-05-30 PROCEDURE — G0108 DIAB MANAGE TRN  PER INDIV: HCPCS | Mod: TEL,95 | Performed by: REGISTERED NURSE

## 2023-05-31 NOTE — PROGRESS NOTES
Diabetes Self-Management Education & Support    Presents for: Initial Assessment for new diagnosis    Type of Service: Telephone Visit    Originating Location (Patient Location): Home  Distant Location (Provider Location): Offsite  Mode of Communication:  Telephone    Telephone Visit Start Time: 11:06 am  Telephone Visit End Time (telephone visit stop time): 12:06 pm    How would patient like to obtain AVS? Mail a copy    Assessment Type:   ASSESSMENT:  Patient newly diagnosed DM2.  Discussed pathophysiology with interpretation and meaning of HgA1c.  Stressed importance of testing BG daily to help keep tight control of DM2, helping to minimize risk for possible complications of uncontrolled diabetes.  Discussed benefits of keeping A1c under 7% and encouraged patient to begin testing BG daily.    Discussed the low carb plan to help decrease weight, improve blood pressure, improve A1c, decrease triglycerides and increase good HDL cholesterol.  These benefits were summarized from the ADA Consensus report in 2019.      Begin carbohydrate counting, low carb plan:  Up to ~ 15 grams with breakfast, 30 grams with lunch and 30 grams with supper.       A key strategy in this plan is, along with medication need, to participate in low to moderate physical activity, such as walking, working up to a minimum of 30 minutes most days, as tolerated.     BG meter training reviewed as patient is SMBG without difficulty.  BG report:  , 104, 119, 113, 133 and 2-hr PP BG 98, 84 mg/dL        Patient's most recent   Lab Results   Component Value Date    A1C 6.9 03/27/2023     is meeting goal of <7.0    Diabetes knowledge and skills assessment:   Patient is knowledgeable in diabetes management concepts related to: Being Active, Monitoring and Taking Medication    Continue education with the following diabetes management concepts: Healthy Eating, Problem Solving, Reducing Risks and Healthy Coping    Based on learning assessment above,  "most appropriate setting for further diabetes education would be: Group class or Individual setting.      PLAN    Continue current plan at this time.  See patient instructions for complete plan.     Topics to cover at upcoming visits: Problem Solving, Reducing Risks and Healthy Coping    Follow-up: TBD per patient schedule.      See Care Plan for co-developed, patient-state behavior change goals.  AVS provided for patient today.    Education Materials Provided:   Type 2 Diabetes packet, My Food Plan, Activity Pyramid, A1c flyer, Tips on SMBG, Diabetes Success Plan, DSMS sheet and New T2DM  folder.       SUBJECTIVE/OBJECTIVE:  Presents for: Initial Assessment for new diagnosis  Diabetes education in the past 24mo: No  Focus of Visit: Being Active, Diabetes Pathophysiology, Monitoring, Healthy Eating  Diabetes type: Type 2  Date of diagnosis: 3/27/2023  Disease course: Stable  Cultural Influences/Ethnic Background:  Not  or     Diabetes Symptoms & Complications:          Patient Problem List and Family Medical History reviewed for relevant medical history, current medical status, and diabetes risk factors.    Vitals:  There were no vitals taken for this visit.  Estimated body mass index is 28.41 kg/m  as calculated from the following:    Height as of 4/3/23: 1.778 m (5' 10\").    Weight as of 4/13/23: 89.8 kg (198 lb).   Last 3 BP:   BP Readings from Last 3 Encounters:   05/17/23 114/60   04/13/23 98/68   04/03/23 (!) 112/93       History   Smoking Status     Never   Smokeless Tobacco     Never       Labs:  Lab Results   Component Value Date    A1C 6.9 03/27/2023     Lab Results   Component Value Date     04/03/2023     03/01/2022     10/23/2019     Lab Results   Component Value Date    LDL 69 03/27/2023    LDL 83 10/23/2019     HDL Cholesterol   Date Value Ref Range Status   10/23/2019 50 23 - 92 mg/dL Final     Direct Measure HDL   Date Value Ref Range Status   03/27/2023 40 >=40 " mg/dL Final   ]  GFR Estimate   Date Value Ref Range Status   04/03/2023 62 >60 mL/min/1.73m2 Final     Comment:     eGFR calculated using 2021 CKD-EPI equation.   10/23/2019 82 >60 mL/min/[1.73_m2] Final     GFR Estimate If Black   Date Value Ref Range Status   10/23/2019 >90 >60 mL/min/[1.73_m2] Final     Lab Results   Component Value Date    CR 1.31 04/03/2023    CR 0.95 10/23/2019     No results found for: MICROALBUMIN    Healthy Eating:  Healthy Eating Assessed Today: Yes  Meal planning/habits: Low salt  Meals include: Breakfast, Lunch, Dinner, Evening Snack  Beverages: Water    Being Active:  Being Active Assessed Today: Yes  Exercise:: Yes  Days per week of moderate to strenuous exercise (like a brisk walk): 7  On average, minutes per day of exercise at this level: 20  How intense was your typical exercise? : Moderate (like brisk walking)  Exercise Minutes per Week: 140  Barrier to exercise: None    Monitoring:  Monitoring Assessed Today: Yes  Did patient bring glucose meter to appointment? : Yes  Blood Glucose Meter: Unknown  Times checking blood sugar at home (number): 2  Times checking blood sugar at home (per): Day  Blood glucose trend: Fluctuating      Taking Medications:  Diabetes Medication(s)     Biguanides       metFORMIN (GLUCOPHAGE XR) 500 MG 24 hr tablet    Take 1 tablet (500 mg) by mouth daily (with dinner)    Sodium-Glucose Co-Transporter 2 (SGLT2) Inhibitors       JARDIANCE 10 MG TABS tablet    Take 1 tablet (10 mg) by mouth daily          Taking Medication Assessed Today: Yes  Current Treatments: Oral Medication (taken by mouth)  Problems taking diabetes medications regularly?: No  Diabetes medication side effects?: No    Problem Solving:                 Reducing Risks:  CAD Risks: Diabetes Mellitus, Male sex, Obesity    Healthy Coping:  Emotional response to diabetes: Ready to learn  Stage of change: ACTION (Actively working towards change)  Support resources: Offerings in Clinic  [facility]  Patient Activation Measure Survey Score:       View : No data to display.                  Care Plan and Education Provided:  Care Plan: Diabetes   Updates made by Crys Theodore RN since 5/30/2023 12:00 AM      Problem: HbA1C Not In Goal       Goal: Establish Regular Follow-Ups with PCP       Task: Discuss with PCP the recommended timing for patient's next follow up visit(s)    Responsible User: Crys Theodore RN      Task: Discuss schedule for PCP visits with patient Completed 5/30/2023   Responsible User: Crys Theodore RN      Goal: Get HbA1C Level in Goal       Task: Educate patient on diabetes education self-management topics Completed 5/30/2023   Responsible User: Crys Theodore RN      Task: Educate patient on benefits of regular glucose monitoring Completed 5/30/2023   Responsible User: Crys Theodore RN      Task: Refer patient to appropriate extended care team member, as needed (Medication Therapy Management, Behavioral Health, Physical Therapy, etc.)    Responsible User: Crys Theodore RN      Task: Discuss diabetes treatment plan with patient Completed 5/30/2023   Responsible User: Crys Theodore RN      Problem: Diabetes Self-Management Education Needed to Optimize Self-Care Behaviors       Goal: Understand diabetes pathophysiology and disease progression       Task: Provide education on diabetes pathophysiology and disease progression specfic to patient's diabetes type Completed 5/30/2023   Responsible User: Crys Theodore RN      Goal: Healthy Eating - follow a healthy eating pattern for diabetes       Task: Provide education on portion control and consistency in amount, composition and timing of food intake Completed 5/30/2023   Responsible User: Crys Theodore RN      Task: Provide education on managing carbohydrate intake (carbohydrate counting, plate planning method, etc.) Completed 5/30/2023   Responsible User: Crys Theodore RN      Task: Provide education on  weight management    Responsible User: Crys Theodore RN      Task: Provide education on heart healthy eating    Responsible User: Crys Theodore RN      Task: Provide education on eating out    Responsible User: Crys Theodore RN      Task: Develop individualized healthy eating plan with patient    Responsible User: Crys Theodore RN      Goal: Being Active - get regular physical activity, working up to at least 150 minutes per week       Task: Provide education on relationship of activity to glucose and precautions to take if at risk for low glucose Completed 5/30/2023   Responsible User: Crys Theodore RN      Task: Discuss barriers to physical activity with patient    Responsible User: Crys Theodore RN      Task: Develop physical activity plan with patient    Responsible User: Crys Theodore RN      Task: Explore community resources including walking groups, assistance programs, and home videos    Responsible User: Crys Theodore RN      Goal: Monitoring - monitor glucose and ketones as directed       Task: Provide education on blood glucose monitoring (purpose, proper technique, frequency, glucose targets, interpreting results, when to use glucose control solution, sharps disposal) Completed 5/30/2023   Responsible User: Crys Theodore RN      Task: Provide education on continuous glucose monitoring (sensor placement, use of zhen or /reader, understanding glucose trends, alerts and alarms, differences between sensor glucose and blood glucose)    Responsible User: Crys Theodore RN      Task: Provide education on ketone monitoring (when to monitor, frequency, etc.)    Responsible User: Crys Theodore RN      Goal: Taking Medication - patient is consistently taking medications as directed       Task: Provide education on action of prescribed medication, including when to take and possible side effects Completed 5/30/2023   Responsible User: Crys Theodore RN      Task: Provide  education on insulin and injectable diabetes medications, including administration, storage, site selection and rotation for injection sites    Responsible User: Crys Theodore RN      Task: Discuss barriers to medication adherence with patient and provide management technique ideas as appropriate    Responsible User: Crys Theodore RN      Task: Provide education on frequency and refill details of medications    Responsible User: Crys Theodore RN      Goal: Problem Solving - know how to prevent and manage short-term diabetes complications       Task: Provide education on high blood glucose - causes, signs/symptoms, prevention and treatment Completed 5/30/2023   Responsible User: Crys Theodore RN      Task: Provide education on low blood glucose - causes, signs/symptoms, prevention, treatment, carrying a carbohydrate source at all times, and medical identification    Responsible User: Crys Theodore RN      Task: Provide education on safe travel with diabetes    Responsible User: Crys Theodore RN      Task: Provide education on how to care for diabetes on sick days    Responsible User: Crys Theodore RN      Task: Provide education on when to call a health care provider    Responsible User: Crys Theodore RN      Goal: Reducing Risks - know how to prevent and treat long-term diabetes complications       Task: Provide education on major complications of diabetes, prevention, early diagnostic measures and treatment of complications Completed 5/30/2023   Responsible User: Crys Theodore RN      Task: Provide education on recommended care for dental, eye and foot health Completed 5/30/2023   Responsible User: Crys Theodore RN      Task: Provide education on Hemoglobin A1c - goals and relationship to blood glucose levels Completed 5/30/2023   Responsible User: Crys Theodore RN      Task: Provide education on recommendations for heart health - lipid levels and goals, blood pressure and goals,  and aspirin therapy, if indicated    Responsible User: Crys Theodore RN      Task: Provide education on tobacco cessation    Responsible User: Crys Theodore RN      Goal: Healthy Coping - use available resources to cope with the challenges of managing diabetes       Task: Discuss recognizing feelings about having diabetes Completed 5/30/2023   Responsible User: Crys Theodore RN      Task: Provide education on the benefits of making appropriate lifestyle changes Completed 5/30/2023   Responsible User: Crys Theodore RN      Task: Provide education on benefits of utilizing support systems    Responsible User: Crys Theodore RN      Task: Discuss methods for coping with stress    Responsible User: Crys Theodore RN      Task: Provide education on when to seek professional counseling    Responsible User: Crys Theodore RN Suzette Childs, RN, BSN, [facility]  5/30/2023 12:09 PM     Time Spent: 60 minutes  Encounter Type: Individual    Any diabetes medication dose changes were made via the CDE Protocol per the patient's primary care provider. A copy of this encounter was shared with the provider.

## 2023-05-31 NOTE — PATIENT INSTRUCTIONS
Diabetes Goals and Reminders    Your A1c test should be done every 3 months.  Your goal is less than 7%.   Your last result is:  Lab Results   Component Value Date    A1C 6.9 03/27/2023       Your LDL cholesterol test should be done at least once a year.  Take a statin, if prescribed by your doctor, based on age and risk factors.  Your last result is:  LDL Cholesterol Calculated   Date Value Ref Range Status   03/27/2023 69 <=100 mg/dL Final   10/23/2019 83 <100 mg/dL Final     Comment:     Desirable:       <100 mg/dl       Have blood pressure and weight checked every three months.  Your blood pressure goal is 130/80 or less.  Your last blood pressure reading was:   BP Readings from Last 1 Encounters:   05/17/23 114/60       Test your blood sugar 1 - 2 times per day.  Your home blood glucose target ranges are:  Fasting or Before meals:   2 hours after a meal: Less than 180      Avoid all tobacco.  Follow your healthy diet and exercise plan.  See the eye doctor every year.  See the dentist every six months.  Have kidney function tested yearly.    Take all medicine as prescribed.  Please let me know if you are having symptoms you don t expect or if you wish to stop any medicine.    Follow Up Plan  Please call or visit Diabetes Education if blood sugars are consistently out of target.  Your future lab plan is:  HgA1c in June 2023.    If you need your cholesterol checked at your next appointment, you should fast 8 to 10 hours before your appointment.  Do not eat or drink anything besides water.  Drink plenty of water and take all your usual medicine.    SUMMARY FOR TODAY'S VISIT    1.  Continue testing blood sugar 1 - 2 times daily.    2.  Discussed the low carb plan to help decrease weight, improve blood pressure, improve A1c, decrease triglycerides and increase good HDL cholesterol.  These benefits were summarized from the ADA Consensus report in 2019.      Recommend beginning to count carbohydrates following  the low carb plan:  Up to ~ 15 grams with breakfast, 30 grams with lunch and 30 grams with supper.       A key strategy in this plan is, along with medication need, to participate in low to moderate physical activity, such as walking, working up to a minimum of 30 minutes most days, as tolerated.      3.  Please follow-up for continued diabetes education, as needed.      Crys Theodore RN, BSN, [facility]  5/30/2023 12:10 PM

## 2023-06-18 DIAGNOSIS — F33.0 MAJOR DEPRESSIVE DISORDER, RECURRENT EPISODE, MILD (H): ICD-10-CM

## 2023-06-18 DIAGNOSIS — F41.9 ANXIETY: Primary | ICD-10-CM

## 2023-06-22 RX ORDER — BUPROPION HYDROCHLORIDE 150 MG/1
TABLET ORAL
Qty: 90 TABLET | Refills: 0 | Status: SHIPPED | OUTPATIENT
Start: 2023-06-22 | End: 2023-09-21

## 2023-08-24 ENCOUNTER — THERAPY VISIT (OUTPATIENT)
Dept: CHIROPRACTIC MEDICINE | Facility: OTHER | Age: 63
End: 2023-08-24
Attending: CHIROPRACTOR
Payer: COMMERCIAL

## 2023-08-24 VITALS — OXYGEN SATURATION: 97 % | TEMPERATURE: 97 F | HEART RATE: 60 BPM | RESPIRATION RATE: 16 BRPM

## 2023-08-24 DIAGNOSIS — M54.50 CHRONIC LOW BACK PAIN, UNSPECIFIED BACK PAIN LATERALITY, UNSPECIFIED WHETHER SCIATICA PRESENT: ICD-10-CM

## 2023-08-24 DIAGNOSIS — M62.838 NECK MUSCLE SPASM: ICD-10-CM

## 2023-08-24 DIAGNOSIS — M62.830 BACK MUSCLE SPASM: ICD-10-CM

## 2023-08-24 DIAGNOSIS — M99.04 SEGMENTAL AND SOMATIC DYSFUNCTION OF SACRAL REGION: Primary | ICD-10-CM

## 2023-08-24 DIAGNOSIS — M99.01 SEGMENTAL AND SOMATIC DYSFUNCTION OF CERVICAL REGION: ICD-10-CM

## 2023-08-24 DIAGNOSIS — M99.02 SEGMENTAL AND SOMATIC DYSFUNCTION OF THORACIC REGION: ICD-10-CM

## 2023-08-24 DIAGNOSIS — G89.29 CHRONIC LOW BACK PAIN, UNSPECIFIED BACK PAIN LATERALITY, UNSPECIFIED WHETHER SCIATICA PRESENT: ICD-10-CM

## 2023-08-24 DIAGNOSIS — M50.30 DEGENERATION OF CERVICAL INTERVERTEBRAL DISC: ICD-10-CM

## 2023-08-24 DIAGNOSIS — M54.6 PAIN IN THORACIC SPINE: ICD-10-CM

## 2023-08-24 PROCEDURE — 99212 OFFICE O/P EST SF 10 MIN: CPT | Mod: 25 | Performed by: CHIROPRACTOR

## 2023-08-24 PROCEDURE — 98941 CHIROPRACT MANJ 3-4 REGIONS: CPT | Mod: AT | Performed by: CHIROPRACTOR

## 2023-08-24 PROCEDURE — 97810 ACUP 1/> WO ESTIM 1ST 15 MIN: CPT | Performed by: CHIROPRACTOR

## 2023-08-24 NOTE — PROGRESS NOTES
Neck is constantly burning. 8/10 W24 10/10. Radiating into left upper back. Using ice, heat, and U/S which are providing a decrease in pain. Bilateral lower back is constantly burning and aching. Radiating into right hip. 7/10 W24 9/10. Using ice, heat, and U/S which are providing a decrease in pain  Юлия Van on 8/24/2023 at 1:31 PM    Reviewed by EW    Visit #:  1  Re-assessment    Subjective:  Adin Madrid is a 63 year old male who is seen in f/u up for:        Segmental and somatic dysfunction of sacral region  Segmental and somatic dysfunction of thoracic region  Segmental and somatic dysfunction of cervical region  Degeneration of cervical intervertebral disc  Chronic low back pain, unspecified back pain laterality, unspecified whether sciatica present  Pain in thoracic spine  Back muscle spasm  Neck muscle spasm.     Since last visit on 5/17/2023,  Adin Madrid reports: Neck and back concerns have been aggravated.  No specific causation such as traumatic events.  Patient feels that aggravation of back and neck concerns quite similar to past experiences.  Patient has been treated by our office with chiropractic adjustments and acupuncture and found beneficial results with these treatments.  Denies any red flag signs or symptoms consistent with cauda equina.    Mild headaches present, patient reports these feel more like what he is used to not what he was experiencing prior to cardiac related issues.    Reports no bleeding concerns at this time.  Following last treatment patient tolerated chiropractic adjustment and acupuncture treatments well without adverse effects.      (DVPRS) Pain Rating Score : 7-Focus of attention, prevents doing daily activities (W24 9/10) (08/24/23 1329)     Objective:  The following was observed:  Pulse 60   Temp 97  F (36.1  C) (Tympanic)   Resp 16   SpO2 97%        8/24/2023     1:31 PM   Neck Disability Index (  Ahmet H. and Varun C. 1991. All rights reserved.; used with  permission)   SECTION 1 - PAIN INTENSITY 3   SECTION 2 - PERSONAL CARE 1   SECTION 3 - LIFTING 3   SECTION 4 - READING 3   SECTION 5 - HEADACHES 3   SECTION 6 - CONCENTRATION 0   SECTION 7 - WORK 3   SECTION 8 - DRIVING 3   SECTION 9 - SLEEPING 3   SECTION 10 - RECREATION 3   Count 10   Sum 25   Raw Score: /50 25   Neck Disability Index Score: (%) 50 %      Cervical AROM: limits with flexion moderate and pain, rotation bilaterally mild    Cervical compression:-      Oswestry (JOSELYN) Questionnaire        8/24/2023     1:33 PM   OSWESTRY DISABILITY INDEX   Count 9   Sum 23   Oswestry Score (%) 51.11 %       Thoracic/lumbar AROM: mild/moderate restrictions with all AROM    Slumps: +right, +left    P: palpatory tenderness left side cervical spine primarily C2/3, C5/6, T3, T10, left PSIS:    A: static palpation demonstrates intersegmental asymmetry , cervical, thoracic, pelvis  R: motion palpation notes restricted motion, C2 , C6 , T3 , T10, and Sacrum   T: muscle spasm at level(s):  Lumbar paraspinals bilaterally, quadratus lumborum bilaterally, thoracic paraspinals primarily around TL junction and intrascapular T-spine bilaterally, suboccipitals and splenius capitis left greater than right, cervical paraspinals on left:      Segmental spinal dysfunction/restrictions found at:  :  C2 Left lateral flexion restricted and Extension restriction  C6 Left lateral flexion restricted and Extension restriction  T3 Right lateral flexion restricted and Extension restriction  T10 Extension restriction  Sacrum Left lateral flexion restricted and Extension restriction.      Assessment: Segmental/somatic dysfunction present cervical, thoracic, pelvic spinal regions all of which are consistent with patient's subjective reports and objective findings.  Due to recent cardiac related concerns still concerned about performing high velocity low amplitude thrust adjustments to cervical spine because of this we will use alternative manual  mobilization techniques as I do believe that this will continue to provide patient benefit along with addressing my concerns of possible adverse effects with high velocity low through thrust adjusting.  Acupuncture at this time although concerning with current blood thinning medication does not appear to be absolutely contraindicated.  We will still use fewer needles and we have typically in the past with patient although we will increase from what we used last time.  Continue to follow-up with patient on as-needed basis as work related schedule can be quite difficult to plan for additional visits.    Diagnoses:      1. Segmental and somatic dysfunction of sacral region    2. Segmental and somatic dysfunction of thoracic region    3. Segmental and somatic dysfunction of cervical region    4. Degeneration of cervical intervertebral disc    5. Chronic low back pain, unspecified back pain laterality, unspecified whether sciatica present    6. Pain in thoracic spine    7. Back muscle spasm    8. Neck muscle spasm        Patient's condition:  Patient had restrictions pre-manipulation    Treatment effectiveness:  Post manipulation there is better intersegmental movement and Patient claims to feel looser post manipulation      Procedures:  E/M 27817    CMT:  62460 Chiropractic manipulative treatment 3-4 regions performed   Cervical: Mobilization, C2, C6, Supine  Thoracic: Diversified, T3, T10, Prone  Pelvis: Diversified, Sacrum , Side posture    Modalities:  96223: Acupuncture, for 15 minutes:  Points:  Bl10, 13, 15, 17, 22, 23, 24, 25, 46, 47, SI 9, GB 20  For 15 minutes    Therapeutic procedures:  None    Response to Treatment  Reduction in symptoms as reported by patient    Prognosis: Good    Progress towards Goals: Reduce neck and back pain symptoms by 50% within 4-6 weeks  Improve disability index scores 20-30%  Proved pain-free spinal AROM primarily rotation     Recommendations:    Instructions:monitor  symptoms.    Follow-up:  Return to care if symptoms persist.

## 2023-09-13 ASSESSMENT — ANXIETY QUESTIONNAIRES
7. FEELING AFRAID AS IF SOMETHING AWFUL MIGHT HAPPEN: NOT AT ALL
5. BEING SO RESTLESS THAT IT IS HARD TO SIT STILL: SEVERAL DAYS
GAD7 TOTAL SCORE: 7
3. WORRYING TOO MUCH ABOUT DIFFERENT THINGS: SEVERAL DAYS
1. FEELING NERVOUS, ANXIOUS, OR ON EDGE: SEVERAL DAYS
6. BECOMING EASILY ANNOYED OR IRRITABLE: SEVERAL DAYS
IF YOU CHECKED OFF ANY PROBLEMS ON THIS QUESTIONNAIRE, HOW DIFFICULT HAVE THESE PROBLEMS MADE IT FOR YOU TO DO YOUR WORK, TAKE CARE OF THINGS AT HOME, OR GET ALONG WITH OTHER PEOPLE: SOMEWHAT DIFFICULT
2. NOT BEING ABLE TO STOP OR CONTROL WORRYING: SEVERAL DAYS
GAD7 TOTAL SCORE: 7
4. TROUBLE RELAXING: MORE THAN HALF THE DAYS

## 2023-09-17 DIAGNOSIS — F41.9 ANXIETY: ICD-10-CM

## 2023-09-17 DIAGNOSIS — F33.0 MAJOR DEPRESSIVE DISORDER, RECURRENT EPISODE, MILD (H): ICD-10-CM

## 2023-09-19 ENCOUNTER — OFFICE VISIT (OUTPATIENT)
Dept: PEDIATRICS | Facility: OTHER | Age: 63
End: 2023-09-19
Attending: INTERNAL MEDICINE
Payer: COMMERCIAL

## 2023-09-19 VITALS
TEMPERATURE: 97.9 F | HEIGHT: 70 IN | RESPIRATION RATE: 16 BRPM | BODY MASS INDEX: 29.38 KG/M2 | DIASTOLIC BLOOD PRESSURE: 88 MMHG | HEART RATE: 56 BPM | OXYGEN SATURATION: 95 % | SYSTOLIC BLOOD PRESSURE: 128 MMHG | WEIGHT: 205.2 LBS

## 2023-09-19 DIAGNOSIS — I48.20 CHRONIC ATRIAL FIBRILLATION (H): ICD-10-CM

## 2023-09-19 DIAGNOSIS — I50.20 SYSTOLIC HEART FAILURE, UNSPECIFIED HF CHRONICITY (H): ICD-10-CM

## 2023-09-19 DIAGNOSIS — F39 MOOD DISORDER (H): ICD-10-CM

## 2023-09-19 DIAGNOSIS — E11.8 CONTROLLED TYPE 2 DIABETES MELLITUS WITH COMPLICATION, WITHOUT LONG-TERM CURRENT USE OF INSULIN (H): ICD-10-CM

## 2023-09-19 DIAGNOSIS — M54.16 LUMBAR RADICULOPATHY: ICD-10-CM

## 2023-09-19 DIAGNOSIS — Z12.5 SCREENING FOR PROSTATE CANCER: ICD-10-CM

## 2023-09-19 DIAGNOSIS — Z13.0 SCREENING, ANEMIA, DEFICIENCY, IRON: ICD-10-CM

## 2023-09-19 DIAGNOSIS — E78.2 MIXED HYPERLIPIDEMIA: ICD-10-CM

## 2023-09-19 DIAGNOSIS — I48.91 ATRIAL FIBRILLATION STATUS POST CARDIOVERSION (H): ICD-10-CM

## 2023-09-19 DIAGNOSIS — M54.12 CERVICAL RADICULOPATHY: ICD-10-CM

## 2023-09-19 DIAGNOSIS — R00.0 WIDE-COMPLEX TACHYCARDIA: ICD-10-CM

## 2023-09-19 DIAGNOSIS — M76.31 IT BAND SYNDROME, RIGHT: ICD-10-CM

## 2023-09-19 DIAGNOSIS — Z76.89 ENCOUNTER TO ESTABLISH CARE: Primary | ICD-10-CM

## 2023-09-19 DIAGNOSIS — E11.9 TYPE 2 DIABETES MELLITUS WITHOUT COMPLICATION, WITHOUT LONG-TERM CURRENT USE OF INSULIN (H): ICD-10-CM

## 2023-09-19 LAB
ANION GAP SERPL CALCULATED.3IONS-SCNC: 13 MMOL/L (ref 7–15)
BUN SERPL-MCNC: 28.9 MG/DL (ref 8–23)
CALCIUM SERPL-MCNC: 9.7 MG/DL (ref 8.8–10.2)
CHLORIDE SERPL-SCNC: 103 MMOL/L (ref 98–107)
CHOLEST SERPL-MCNC: 176 MG/DL
CREAT SERPL-MCNC: 1.27 MG/DL (ref 0.67–1.17)
CREAT UR-MCNC: 173 MG/DL
DEPRECATED HCO3 PLAS-SCNC: 24 MMOL/L (ref 22–29)
EGFRCR SERPLBLD CKD-EPI 2021: 64 ML/MIN/1.73M2
ERYTHROCYTE [DISTWIDTH] IN BLOOD BY AUTOMATED COUNT: 12.1 % (ref 10–15)
GLUCOSE SERPL-MCNC: 92 MG/DL (ref 70–99)
HBA1C MFR BLD: 5.9 % (ref 4–6.2)
HCT VFR BLD AUTO: 47.8 % (ref 40–53)
HDLC SERPL-MCNC: 51 MG/DL
HGB BLD-MCNC: 16.9 G/DL (ref 13.3–17.7)
LDLC SERPL CALC-MCNC: 62 MG/DL
MCH RBC QN AUTO: 32.3 PG (ref 26.5–33)
MCHC RBC AUTO-ENTMCNC: 35.4 G/DL (ref 31.5–36.5)
MCV RBC AUTO: 91 FL (ref 78–100)
MICROALBUMIN UR-MCNC: <12 MG/L
MICROALBUMIN/CREAT UR: NORMAL MG/G{CREAT}
NONHDLC SERPL-MCNC: 125 MG/DL
PLATELET # BLD AUTO: 274 10E3/UL (ref 150–450)
POTASSIUM SERPL-SCNC: 4.1 MMOL/L (ref 3.4–5.3)
PSA SERPL DL<=0.01 NG/ML-MCNC: 1.01 NG/ML (ref 0–4.5)
RBC # BLD AUTO: 5.23 10E6/UL (ref 4.4–5.9)
SODIUM SERPL-SCNC: 140 MMOL/L (ref 136–145)
TRIGL SERPL-MCNC: 314 MG/DL
WBC # BLD AUTO: 8.8 10E3/UL (ref 4–11)

## 2023-09-19 PROCEDURE — 80048 BASIC METABOLIC PNL TOTAL CA: CPT | Mod: ZL | Performed by: INTERNAL MEDICINE

## 2023-09-19 PROCEDURE — 99214 OFFICE O/P EST MOD 30 MIN: CPT | Mod: 25 | Performed by: INTERNAL MEDICINE

## 2023-09-19 PROCEDURE — 82570 ASSAY OF URINE CREATININE: CPT | Mod: ZL | Performed by: INTERNAL MEDICINE

## 2023-09-19 PROCEDURE — 80061 LIPID PANEL: CPT | Mod: ZL | Performed by: INTERNAL MEDICINE

## 2023-09-19 PROCEDURE — 83036 HEMOGLOBIN GLYCOSYLATED A1C: CPT | Mod: ZL | Performed by: INTERNAL MEDICINE

## 2023-09-19 PROCEDURE — G0103 PSA SCREENING: HCPCS | Mod: ZL | Performed by: INTERNAL MEDICINE

## 2023-09-19 PROCEDURE — 36415 COLL VENOUS BLD VENIPUNCTURE: CPT | Mod: ZL | Performed by: INTERNAL MEDICINE

## 2023-09-19 PROCEDURE — 90682 RIV4 VACC RECOMBINANT DNA IM: CPT | Performed by: INTERNAL MEDICINE

## 2023-09-19 PROCEDURE — 90471 IMMUNIZATION ADMIN: CPT | Performed by: INTERNAL MEDICINE

## 2023-09-19 PROCEDURE — 85027 COMPLETE CBC AUTOMATED: CPT | Mod: ZL | Performed by: INTERNAL MEDICINE

## 2023-09-19 RX ORDER — AMIODARONE HYDROCHLORIDE 200 MG/1
200 TABLET ORAL DAILY
COMMUNITY
Start: 2023-09-19 | End: 2024-07-18

## 2023-09-19 RX ORDER — EMPAGLIFLOZIN 10 MG/1
10 TABLET, FILM COATED ORAL DAILY
Qty: 90 TABLET | Refills: 4 | Status: SHIPPED | OUTPATIENT
Start: 2023-09-19 | End: 2024-07-18

## 2023-09-19 RX ORDER — SPIRONOLACTONE 25 MG/1
12.5 TABLET ORAL DAILY
COMMUNITY
Start: 2023-09-19 | End: 2024-07-18

## 2023-09-19 RX ORDER — BUPROPION HYDROCHLORIDE 150 MG/1
150 TABLET ORAL EVERY MORNING
Qty: 90 TABLET | Refills: 3 | Status: SHIPPED | OUTPATIENT
Start: 2023-09-19 | End: 2024-07-18

## 2023-09-19 RX ORDER — PAROXETINE 40 MG/1
40 TABLET, FILM COATED ORAL EVERY MORNING
Qty: 90 TABLET | Refills: 3 | Status: SHIPPED | OUTPATIENT
Start: 2023-09-19 | End: 2024-03-14 | Stop reason: DRUGHIGH

## 2023-09-19 RX ORDER — ROSUVASTATIN CALCIUM 5 MG/1
5 TABLET, COATED ORAL DAILY
Qty: 90 TABLET | Refills: 4 | Status: SHIPPED | OUTPATIENT
Start: 2023-09-19 | End: 2024-07-18

## 2023-09-19 RX ORDER — METOPROLOL SUCCINATE 25 MG/1
25 TABLET, EXTENDED RELEASE ORAL DAILY
Qty: 90 TABLET | Refills: 3 | Status: SHIPPED | OUTPATIENT
Start: 2023-09-19 | End: 2024-07-18

## 2023-09-19 RX ORDER — LISINOPRIL 2.5 MG/1
2.5 TABLET ORAL DAILY
Qty: 90 TABLET | Refills: 3 | Status: SHIPPED | OUTPATIENT
Start: 2023-09-19 | End: 2024-06-20

## 2023-09-19 RX ORDER — METFORMIN HCL 500 MG
500 TABLET, EXTENDED RELEASE 24 HR ORAL
Qty: 90 TABLET | Refills: 11 | Status: SHIPPED | OUTPATIENT
Start: 2023-09-19 | End: 2024-07-18

## 2023-09-19 ASSESSMENT — PATIENT HEALTH QUESTIONNAIRE - PHQ9
SUM OF ALL RESPONSES TO PHQ QUESTIONS 1-9: 7
10. IF YOU CHECKED OFF ANY PROBLEMS, HOW DIFFICULT HAVE THESE PROBLEMS MADE IT FOR YOU TO DO YOUR WORK, TAKE CARE OF THINGS AT HOME, OR GET ALONG WITH OTHER PEOPLE: SOMEWHAT DIFFICULT
SUM OF ALL RESPONSES TO PHQ QUESTIONS 1-9: 7

## 2023-09-19 ASSESSMENT — ASTHMA QUESTIONNAIRES: ACT_TOTALSCORE: 24

## 2023-09-19 ASSESSMENT — PAIN SCALES - GENERAL: PAINLEVEL: SEVERE PAIN (6)

## 2023-09-19 NOTE — PATIENT INSTRUCTIONS
-- Trial of Voltaren/diclofenac gel   -- Keep acetaminophen to less than 4000 mg/day    Aspects of Diabetes we can improve:  Hemoglobin A1c Lab Results   Component Value Date    A1C 6.9 03/27/2023    A1C 6.4 03/01/2022    Goal range is under 8. Best is 6.5 to 7   Blood Pressure 128/88 Goal to keep less than 140/90   Tobacco  reports that he has never smoked. He has never used smokeless tobacco. Goal to abstain from tobacco   Aspirin On Eliquis Aspirin reduces risk of heart disease and stroke   ACE/ARB Start lisinopril These medications reduce risk of kidney disease   Cholesterol Crestor Statins reduce risk of heart disease and stroke   Eye Exam annual Annual diabetic eye exam   Healthy weight Body mass index is 29.44 kg/m . Goal BMI under 30, best is under 25.      -- I'm trying to exercise daily (goal at least 20 min/day) with moderate aerobic activity   -- Eat healthy (resources from ADA at http://www.diabetes.org/)   -- I'm taking good care of my feet. Consider seeing the Podiatrist   -- Check blood sugars as directed, record in log book and bring to every appointment   -- Goal sugar before breakfast: under 140   -- Goal sugar 2 hours after supper: under 170   -- Next diabetes lab draw: 6-12 months   -- Next diabetes office visit: 6-12 months     Rx was sent 08-30-19 to [facility].  Rx sent to [facility].

## 2023-09-19 NOTE — PROGRESS NOTES
Assessment & Plan   1. Encounter to establish care    2. Wide-complex tachycardia  3. Chronic atrial fibrillation (H)  Appreciate cardiology consultation and management.  He is working toward coming off of his many medications as he can.  I encouraged him to continue meeting with [facility] cardiology to see which ones he could come off of eventually.  He is wondering if he truly needs to go forward with the electrophysiology study and I have encouraged him to do that if recommended by his cardiology team.  - metoprolol succinate ER (TOPROL XL) 25 MG 24 hr tablet; Take 1 tablet (25 mg) by mouth daily  Dispense: 90 tablet; Refill: 3    4. Systolic heart failure, unspecified HF chronicity (H)  His systolic function has normalized    5. Atrial fibrillation status post cardioversion (H)  He sounds to be in sinus rhythm and reports he has been in sinus rhythm    6. Cervical radiculopathy  7. Lumbar radiculopathy  8. It band syndrome, right  His neck sounds like it is most consistent with cervical foraminal stenosis however differential diagnosis also includes cervical facet arthropathy, cervical disc disease, others.  His low back sounds most consistent with lumbar foraminal stenoses however differential diagnosis also includes lumbar facet arthropathy, lumbar disc disease, others.  He also does appear to have trochanteric bursitis versus IT band syndrome.  I recommend physical therapy.  Consider advanced imaging if symptoms persist or worsen.  Warning signs were discussed.  - Physical Therapy Referral; Future    9. Controlled type 2 diabetes mellitus with complication, without long-term current use of insulin (H)  10. Type 2 diabetes mellitus without complication, without long-term current use of insulin (H)  At goal, no change.  - JARDIANCE 10 MG TABS tablet; Take 1 tablet (10 mg) by mouth daily  Dispense: 90 tablet; Refill: 4  - metFORMIN (GLUCOPHAGE XR) 500 MG 24 hr tablet; Take 1 tablet (500 mg) by mouth daily (with  dinner)  Dispense: 90 tablet; Refill: 11  - rosuvastatin (CRESTOR) 5 MG tablet; Take 1 tablet (5 mg) by mouth daily  Dispense: 90 tablet; Refill: 4  - lisinopril (ZESTRIL) 2.5 MG tablet; Take 1 tablet (2.5 mg) by mouth daily  Dispense: 90 tablet; Refill: 3  - Albumin Random Urine Quantitative with Creat Ratio; Future  - Hemoglobin A1c; Future  - Basic metabolic panel; Future  - Hemoglobin A1c  - Basic metabolic panel  - Albumin Random Urine Quantitative with Creat Ratio    11. Mood disorder (H)  At goal, no change.  - PARoxetine (PAXIL) 40 MG tablet; Take 1 tablet (40 mg) by mouth every morning  Dispense: 90 tablet; Refill: 3  - buPROPion (WELLBUTRIN XL) 150 MG 24 hr tablet; Take 1 tablet (150 mg) by mouth every morning  Dispense: 90 tablet; Refill: 3    12. Screening, anemia, deficiency, iron  - CBC with platelets; Future  - CBC with platelets    13. Mixed hyperlipidemia  - Lipid Profile; Future  - Lipid Profile    14. Screening for prostate cancer  - Prostate Specific Antigen Screen; Future  - Prostate Specific Antigen Screen      Patient Instructions    -- Trial of Voltaren/diclofenac gel   -- Keep acetaminophen to less than 4000 mg/day    Aspects of Diabetes we can improve:  Hemoglobin A1c Lab Results   Component Value Date    A1C 6.9 03/27/2023    A1C 6.4 03/01/2022    Goal range is under 8. Best is 6.5 to 7   Blood Pressure 128/88 Goal to keep less than 140/90   Tobacco  reports that he has never smoked. He has never used smokeless tobacco. Goal to abstain from tobacco   Aspirin On Eliquis Aspirin reduces risk of heart disease and stroke   ACE/ARB Start lisinopril These medications reduce risk of kidney disease   Cholesterol Crestor Statins reduce risk of heart disease and stroke   Eye Exam annual Annual diabetic eye exam   Healthy weight Body mass index is 29.44 kg/m . Goal BMI under 30, best is under 25.      -- I'm trying to exercise daily (goal at least 20 min/day) with moderate aerobic activity   -- Eat  "healthy (resources from ADA at http://www.diabetes.org/)   -- I'm taking good care of my feet. Consider seeing the Podiatrist   -- Check blood sugars as directed, record in log book and bring to every appointment   -- Goal sugar before breakfast: under 140   -- Goal sugar 2 hours after supper: under 170   -- Next diabetes lab draw: 6-12 months   -- Next diabetes office visit: 6-12 months        Return in about 1 year (around 9/19/2024), or if symptoms worsen or fail to improve, for annual visit.    Signed, Adin Ruano MD, FAAP, FACP  Internal Medicine & Pediatrics    Subjective   Adin Madrid is a 63 year old male who presents for establish primary care, discuss multiple concerns.  Previous physician was Dr. Rojas.  When last we met he was extremely ill.  He had a wide-complex tachycardia and hypotension and was brought to the ER.  Found to have a significant reduction of ejection fraction.  Transferred to [facility] in [place].  Underwent synchronized cardioversion.  Had a left ventricular thrombus.  Is treated with anticoagulation and antiarrhythmics.  Followed with Giles at [facility] cardiology.  He is doing significantly better.  Tells me his heart is strong again.  He has some aches and pains he wants to discuss.  He is been having some pain in his neck radiating to his hands.  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  He has pain in the low back.  It radiates to the buttock and lateral thigh.  He has a separate pain that is present in the proximal lateral thigh.  He has been doing ultrasound treatments at home with his own machine as well as chiropractic treatments.    Objective   Vitals: /88   Pulse 56   Temp 97.9  F (36.6  C) (Tympanic)   Resp 16   Ht 1.778 m (5' 10\")   Wt 93.1 kg (205 lb 3.2 oz)   SpO2 95%   BMI 29.44 kg/m      Cardiovascular: Regular rate and rhythm, no murmur  Pulmonary: Clear  HEENT: No bruits  Musculoskeletal: Mild " tenderness to palpation over the right greater trochanter.  No pain elicited with abduction across the midline of the hip.  No pain elicited with internal/external rotation of the right hip.    Review and Analysis of Data   I personally reviewed the following:  External notes: Yes [facility] cardiology notes reviewed  Results: Yes local and remote lab and imaging data reviewed  Use of an independent historian: No  Independent review of a test performed by another physician: No  Discussion of management with another physician: No  Moderate risk of morbidity from additional diagnostic testing and/or treatment.

## 2023-09-19 NOTE — Clinical Note
Please abstract the following data from this visit with this patient into the appropriate field in Epic:  Tests that can be patient reported without a hard copy:  Eye exam with ophthalmology on this date: 2021 Exam Location: [facility]- patient reported  Other Tests found in the patient's chart through Chart Review/Care Everywhere:  Eye exam with ophthalmology done by this group [facility] on this date: 2021- patient reported  Note to Abstraction: If this section is blank, no results were found via Chart Review/Care Everywhere.

## 2023-09-19 NOTE — NURSING NOTE
"Chief Complaint   Patient presents with    Establish Care    Recheck Medication    Pain     Neck and low back         Initial /88   Pulse 56   Temp 97.9  F (36.6  C) (Tympanic)   Resp 16   Ht 1.778 m (5' 10\")   Wt 93.1 kg (205 lb 3.2 oz)   SpO2 95%   BMI 29.44 kg/m   Estimated body mass index is 29.44 kg/m  as calculated from the following:    Height as of this encounter: 1.778 m (5' 10\").    Weight as of this encounter: 93.1 kg (205 lb 3.2 oz).         Norma J. Gosselin, LPN     "

## 2023-09-21 RX ORDER — BUPROPION HYDROCHLORIDE 150 MG/1
TABLET ORAL
Qty: 90 TABLET | Refills: 3 | Status: SHIPPED | OUTPATIENT
Start: 2023-09-21 | End: 2024-07-18

## 2023-10-12 ENCOUNTER — THERAPY VISIT (OUTPATIENT)
Dept: PHYSICAL THERAPY | Facility: OTHER | Age: 63
End: 2023-10-12
Attending: INTERNAL MEDICINE
Payer: COMMERCIAL

## 2023-10-12 DIAGNOSIS — M76.31 IT BAND SYNDROME, RIGHT: ICD-10-CM

## 2023-10-12 DIAGNOSIS — M54.12 CERVICAL RADICULOPATHY: ICD-10-CM

## 2023-10-12 DIAGNOSIS — M54.16 LUMBAR RADICULOPATHY: ICD-10-CM

## 2023-10-12 PROCEDURE — 97110 THERAPEUTIC EXERCISES: CPT | Mod: GP

## 2023-10-12 PROCEDURE — 97161 PT EVAL LOW COMPLEX 20 MIN: CPT | Mod: GP

## 2023-10-12 NOTE — PROGRESS NOTES
PHYSICAL THERAPY EVALUATION  Type of Visit: Evaluation    See electronic medical record for Abuse and Falls Screening details.    Subjective       Presenting condition or subjective complaint: Cervical and lumbar pain, right lateral hip pain  Date of onset:      Relevant medical history: -- (Recent issues with atrial fibillation with hospitalization)   Dates & types of surgery: None    Prior diagnostic imaging/testing results: X-ray (Cervical and lumbar)     Prior therapy history for the same diagnosis, illness or injury: No Chiropractic, accupuncture    Prior Level of Function  Transfers:   Ambulation:   ADL:   IADL: NML    Living Environment  Social support: With family members   Type of home: House   Stairs to enter the home: Yes 5 Is there a railing: Yes   Ramp:     Stairs inside the home: Yes 12 Is there a railing: Yes   Help at home: None  Equipment owned:       Employment: Yes [employer]  Hobbies/Interests: Classic c ars, hunting,    Patient goals for therapy:      Pain assessment: Pain present  Location: right neck/Ratin-6/10     Objective   CERVICAL SPINE EVALUATION  PAIN: Pain Level at Rest: 2/10  Pain Level with Use: 5/10  INTEGUMENTARY (edema, incisions): WFL  POSTURE: WFL  GAIT:   Weightbearing Status:  Assistive Device(s):   Gait Deviations: nml  BALANCE/PROPRIOCEPTION: WFL  WEIGHTBEARING ALIGNMENT: WFL  ROM: Cervical rotation is 30 deg, left 40 deg, lateral flexion is 15 deg left and 10 deg right.30 deg flexion 30 deg ext.    MYOTOMES: WNL  DTR S: WNL  CORD SIGNS: NML  DERMATOMES: WNL  NEURAL TENSION: Lumbar WNL  FLEXIBILITY: See range of motion measurements above  SPECIAL TESTS: Compression and distraction test negative.  Spurling test is negative.  PALPATION: Noted soft tissue tension throughout the bilateral upper shoulder region including upper trapezius, levator scapula and scalene muscles.  Noted active trigger points in the scapular region as well as upper shoulder muscle group.  SPINAL  SEGMENTAL CONCLUSIONS: Note General hypomobility throughout the cervical spine most noted from C 4 through C7 and upper thoracic segments      Assessment & Plan   CLINICAL IMPRESSIONS  Medical Diagnosis:      Treatment Diagnosis:     Impression/Assessment:  Patient is a 63-year-old male whom presents to physical therapy with complaint of both neck and low back pain which she is experienced for several months to years.  His main complaint at this time is of neck pain.  The balance of this evaluation focused on the cervical region today.  Additional evaluation of the lumbar and lower extremity region will take place at his next appointment.  Patient is interested in physical therapy treatment as necessary with focus on therapeutic exercise program he is able to complete as a home exercise program.  Patient is very active both at work and at home.  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  He is active in his home life as well including all outdoor activities, working in the woods.    Clinical Decision Making (Complexity):  Clinical Presentation: Stable/Uncomplicated  Clinical Presentation Rationale: based on medical and personal factors listed in PT evaluation  Clinical Decision Making (Complexity): Low complexity    PLAN OF CARE  Treatment Interventions:  Modalities: Cryotherapy, E-stim, Hot Pack, Ultrasound  Interventions: Manual Therapy, Therapeutic Exercise    Long Term Goals            Frequency of Treatment:    Duration of Treatment:      Recommended Referrals to Other Professionals: Not indicated  Education Assessment:        Risks and benefits of evaluation/treatment have been explained.   Patient/Family/caregiver agrees with Plan of Care.     Evaluation Time:             Signing Clinician: Devante Torre, PT

## 2023-10-16 PROBLEM — M76.31 IT BAND SYNDROME, RIGHT: Status: ACTIVE | Noted: 2023-10-16

## 2023-10-17 ENCOUNTER — THERAPY VISIT (OUTPATIENT)
Dept: PHYSICAL THERAPY | Facility: OTHER | Age: 63
End: 2023-10-17
Attending: INTERNAL MEDICINE
Payer: COMMERCIAL

## 2023-10-17 DIAGNOSIS — M54.16 LUMBAR RADICULOPATHY: Primary | ICD-10-CM

## 2023-10-17 DIAGNOSIS — M54.12 CERVICAL RADICULOPATHY: ICD-10-CM

## 2023-10-17 PROCEDURE — 97140 MANUAL THERAPY 1/> REGIONS: CPT | Mod: GP

## 2023-10-17 PROCEDURE — 97110 THERAPEUTIC EXERCISES: CPT | Mod: GP

## 2023-10-24 ENCOUNTER — THERAPY VISIT (OUTPATIENT)
Dept: PHYSICAL THERAPY | Facility: OTHER | Age: 63
End: 2023-10-24
Attending: INTERNAL MEDICINE
Payer: COMMERCIAL

## 2023-10-24 DIAGNOSIS — M54.12 CERVICAL RADICULOPATHY: ICD-10-CM

## 2023-10-24 DIAGNOSIS — M54.16 LUMBAR RADICULOPATHY: Primary | ICD-10-CM

## 2023-10-24 PROCEDURE — 97140 MANUAL THERAPY 1/> REGIONS: CPT | Mod: GP

## 2023-10-24 PROCEDURE — 97012 MECHANICAL TRACTION THERAPY: CPT | Mod: GP

## 2023-10-31 ENCOUNTER — THERAPY VISIT (OUTPATIENT)
Dept: PHYSICAL THERAPY | Facility: OTHER | Age: 63
End: 2023-10-31
Attending: INTERNAL MEDICINE
Payer: COMMERCIAL

## 2023-10-31 DIAGNOSIS — M54.16 LUMBAR RADICULOPATHY: Primary | ICD-10-CM

## 2023-10-31 DIAGNOSIS — M54.12 CERVICAL RADICULOPATHY: ICD-10-CM

## 2023-10-31 PROCEDURE — 97140 MANUAL THERAPY 1/> REGIONS: CPT | Mod: GP,CQ

## 2023-11-02 ENCOUNTER — THERAPY VISIT (OUTPATIENT)
Dept: PHYSICAL THERAPY | Facility: OTHER | Age: 63
End: 2023-11-02
Attending: INTERNAL MEDICINE
Payer: COMMERCIAL

## 2023-11-02 ENCOUNTER — THERAPY VISIT (OUTPATIENT)
Dept: CHIROPRACTIC MEDICINE | Facility: OTHER | Age: 63
End: 2023-11-02
Attending: CHIROPRACTOR
Payer: COMMERCIAL

## 2023-11-02 VITALS — OXYGEN SATURATION: 96 % | HEART RATE: 63 BPM | RESPIRATION RATE: 16 BRPM

## 2023-11-02 DIAGNOSIS — M54.50 CHRONIC LOW BACK PAIN, UNSPECIFIED BACK PAIN LATERALITY, UNSPECIFIED WHETHER SCIATICA PRESENT: ICD-10-CM

## 2023-11-02 DIAGNOSIS — M99.04 SEGMENTAL AND SOMATIC DYSFUNCTION OF SACRAL REGION: Primary | ICD-10-CM

## 2023-11-02 DIAGNOSIS — G89.29 CHRONIC LOW BACK PAIN, UNSPECIFIED BACK PAIN LATERALITY, UNSPECIFIED WHETHER SCIATICA PRESENT: ICD-10-CM

## 2023-11-02 DIAGNOSIS — M54.16 LUMBAR RADICULOPATHY: Primary | ICD-10-CM

## 2023-11-02 DIAGNOSIS — M54.6 PAIN IN THORACIC SPINE: ICD-10-CM

## 2023-11-02 DIAGNOSIS — M54.12 CERVICAL RADICULOPATHY: ICD-10-CM

## 2023-11-02 DIAGNOSIS — M99.02 SEGMENTAL AND SOMATIC DYSFUNCTION OF THORACIC REGION: ICD-10-CM

## 2023-11-02 DIAGNOSIS — M62.838 NECK MUSCLE SPASM: ICD-10-CM

## 2023-11-02 DIAGNOSIS — M50.30 DEGENERATION OF CERVICAL INTERVERTEBRAL DISC: ICD-10-CM

## 2023-11-02 DIAGNOSIS — M99.01 SEGMENTAL AND SOMATIC DYSFUNCTION OF CERVICAL REGION: ICD-10-CM

## 2023-11-02 DIAGNOSIS — M62.830 BACK MUSCLE SPASM: ICD-10-CM

## 2023-11-02 PROCEDURE — 97110 THERAPEUTIC EXERCISES: CPT | Mod: GP

## 2023-11-02 PROCEDURE — 97140 MANUAL THERAPY 1/> REGIONS: CPT | Mod: GP

## 2023-11-02 PROCEDURE — 97810 ACUP 1/> WO ESTIM 1ST 15 MIN: CPT | Performed by: CHIROPRACTOR

## 2023-11-02 PROCEDURE — 98941 CHIROPRACT MANJ 3-4 REGIONS: CPT | Mod: AT | Performed by: CHIROPRACTOR

## 2023-11-02 NOTE — PROGRESS NOTES
Neck is constantly burning. Radiating into left hand. 7/10 W24 9/10. Using ice, heat, ultra sound, and stretches. These are providing a decrease in pain. Bilateral lower back is constantly sore. Radiating down right leg at times. 7/10 W24 8/10. Using ice, heat, ultra sound, and stretches. These are providing a decrease in pain.  Юлия Van on 11/2/2023 at 11:44 AM    Reviewed by EW    Visit #:  2    Subjective:  Adin Madrid is a 63 year old male who is seen in f/u up for:        Segmental and somatic dysfunction of sacral region  Segmental and somatic dysfunction of thoracic region  Segmental and somatic dysfunction of cervical region  Degeneration of cervical intervertebral disc  Chronic low back pain, unspecified back pain laterality, unspecified whether sciatica present  Pain in thoracic spine  Back muscle spasm  Neck muscle spasm.     Since last visit on 8/24/2023,  Adin Madrid reports: Has been working with physical therapy as of recently for radiculopathy stemming from the lumbar and cervical regions.  Notes that this has been helping especially with the neck and upper back.  No recent traumatic events contributing to symptoms.  Presents for further treatment of neck and back concerns        (DVPRS) Pain Rating Score : 7-Focus of attention, prevents doing daily activities (W24 8/10) (11/02/23 1141)     Objective:  The following was observed:  Pulse 63   Resp 16   SpO2 96%        11/2/2023    11:44 AM   Neck Disability Index (  Ahmet ENGLISH. and Varun LIZ. 1991. All rights reserved.; used with permission)   SECTION 1 - PAIN INTENSITY 3   SECTION 2 - PERSONAL CARE 2   SECTION 3 - LIFTING 1   SECTION 4 - READING 4   SECTION 5 - HEADACHES 3   SECTION 6 - CONCENTRATION 1   SECTION 7 - WORK 2   SECTION 8 - DRIVING 3   SECTION 9 - SLEEPING 3   SECTION 10 - RECREATION 3   Count 10   Sum 25   Raw Score: /50 25   Neck Disability Index Score: (%) 50 %      Oswestry (JOSELYN) Questionnaire        11/2/2023    11:46 AM    OSWESTRY DISABILITY INDEX   Count 9   Sum 17   Oswestry Score (%) 37.78 %           P: palpatory tenderness left PSIS, T10, T4, C6 on left:    A: static palpation demonstrates intersegmental asymmetry , cervical, thoracic, pelvis  R: motion palpation notes restricted motion, C6 , T4 , T10, and Sacrum   T: muscle spasm at level(s):  Moderate/severe quadratus lumborum and lumbar paraspinals bilaterally, moderate thoracic paraspinals bilaterally, mild/moderate upper trapezius and cervical paraspinals bilaterally:      Segmental spinal dysfunction/restrictions found at:  :  C6 Left lateral flexion restricted and Extension restriction  T4 Extension restriction  T10 Right lateral flexion restricted and Extension restriction  Sacrum Left lateral flexion restricted and Extension restriction.      Assessment: Continuation of symptoms seen on last encounter.  Patient has had experience with our office and benefit with chiropractic adjustments with acupuncture.  Continue to follow-up with patient if needed at this time.    Diagnoses:      1. Segmental and somatic dysfunction of sacral region    2. Segmental and somatic dysfunction of thoracic region    3. Segmental and somatic dysfunction of cervical region    4. Degeneration of cervical intervertebral disc    5. Chronic low back pain, unspecified back pain laterality, unspecified whether sciatica present    6. Pain in thoracic spine    7. Back muscle spasm    8. Neck muscle spasm        Patient's condition:  Patient had restrictions pre-manipulation    Treatment effectiveness:  Post manipulation there is better intersegmental movement and Patient claims to feel looser post manipulation      Procedures:  CMT:  68647 Chiropractic manipulative treatment 3-4 regions performed   Cervical: gentle light force diversified/mobilization, C6, Supine  Thoracic: Diversified, T4, T10, Prone  Pelvis: Diversified, Sacrum , Side posture    Modalities:  31655: Acupuncture, for 15 minutes:   Points:  Bl 10, 22, 23, 24, 25, 46, 47, GV 9, 10, 12, SI 9, left side GB 20  For 15 minutes    Therapeutic procedures:  None  Deferred to physical therapy    Response to Treatment  Reduction in symptoms as reported by patient    Prognosis: Good    Progress towards Goals: in progress     Recommendations:    Instructions: monitor symptoms, continue with PT    Follow-up:  Return to care if symptoms persist.

## 2023-11-09 ENCOUNTER — THERAPY VISIT (OUTPATIENT)
Dept: CHIROPRACTIC MEDICINE | Facility: OTHER | Age: 63
End: 2023-11-09
Attending: CHIROPRACTOR
Payer: COMMERCIAL

## 2023-11-09 VITALS — HEART RATE: 58 BPM | TEMPERATURE: 97 F | RESPIRATION RATE: 16 BRPM | OXYGEN SATURATION: 94 %

## 2023-11-09 DIAGNOSIS — M50.30 DEGENERATION OF CERVICAL INTERVERTEBRAL DISC: ICD-10-CM

## 2023-11-09 DIAGNOSIS — M99.01 SEGMENTAL AND SOMATIC DYSFUNCTION OF CERVICAL REGION: ICD-10-CM

## 2023-11-09 DIAGNOSIS — G89.29 CHRONIC LOW BACK PAIN, UNSPECIFIED BACK PAIN LATERALITY, UNSPECIFIED WHETHER SCIATICA PRESENT: ICD-10-CM

## 2023-11-09 DIAGNOSIS — M54.6 PAIN IN THORACIC SPINE: ICD-10-CM

## 2023-11-09 DIAGNOSIS — M54.50 CHRONIC LOW BACK PAIN, UNSPECIFIED BACK PAIN LATERALITY, UNSPECIFIED WHETHER SCIATICA PRESENT: ICD-10-CM

## 2023-11-09 DIAGNOSIS — M99.04 SEGMENTAL AND SOMATIC DYSFUNCTION OF SACRAL REGION: Primary | ICD-10-CM

## 2023-11-09 DIAGNOSIS — M99.05 SEGMENTAL AND SOMATIC DYSFUNCTION OF PELVIC REGION: ICD-10-CM

## 2023-11-09 DIAGNOSIS — M99.02 SEGMENTAL AND SOMATIC DYSFUNCTION OF THORACIC REGION: ICD-10-CM

## 2023-11-09 PROCEDURE — 98941 CHIROPRACT MANJ 3-4 REGIONS: CPT | Mod: AT | Performed by: CHIROPRACTOR

## 2023-11-09 PROCEDURE — 97810 ACUP 1/> WO ESTIM 1ST 15 MIN: CPT | Performed by: CHIROPRACTOR

## 2023-11-09 NOTE — PROGRESS NOTES
Neck is constantly stiff. Decrease ROM still noted. 7/10 W24 9/10. Using heat, ice, U/S and stretches which are providing relief and increase in ROM. Right lower back is constantly sharp. Radiating down right leg. 10/10 W24 10/10. Using heat, ice, U/S and stretches. Heat and U/S provide a decrease in pain. Stretches are almost impossible to do at this time. Ice is increasing pain.  Юлия Van on 11/9/2023 at 11:28 AM    Reviewed by EW    Visit #:  3    Subjective:  Adin Madrid is a 63 year old male who is seen in f/u up for:        Segmental and somatic dysfunction of sacral region  Segmental and somatic dysfunction of pelvic region  Segmental and somatic dysfunction of thoracic region  Segmental and somatic dysfunction of cervical region  Degeneration of cervical intervertebral disc  Chronic low back pain, unspecified back pain laterality, unspecified whether sciatica present  Pain in thoracic spine.     Since last visit on 11/2/2023,  Adin Madrid reports: Symptoms did well after our last encounter.  About 3 days ago back pain exacerbated acutely.  No specific causation such as traumatic events or overuse.  Patient is experiencing some sciatica symptoms but denies any red flags such as weakness, alterations to normal bowel/bladder habits.  Home care measures listed above are providing some level of help.    (DVPRS) Pain Rating Score : 10-As bad as it could be, nothing else matters (W24 10/10) (11/09/23 1125)     Objective:  The following was observed:  Pulse 58   Temp 97  F (36.1  C) (Tympanic)   Resp 16   SpO2 94%      P: palpatory tenderness PSIS bilaterally:    A: static palpation demonstrates intersegmental asymmetry , cervical, thoracic, pelvis  R: motion palpation notes restricted motion, C6 , T4 , Sacrum , and PSIS Left   T: Moderate spasms lumbar paraspinals and quadratus lumborum bilaterally, taut and tender fibers noted sacrotuberous ligament and piriformis muscle on the left, spasms apparent left  splenius capitis, left suboccipitals, taut and tender fibers throughout paraspinals of thoracic region bilaterally    Segmental spinal dysfunction/restrictions found at:  :  C6 Left lateral flexion restricted and Extension restriction  T4 Left lateral flexion restricted and Extension restriction  Sacrum apex right  PSIS Right PI/EX .      Assessment: Patient seems to be experiencing acute exacerbation of symptoms.  Anticipate patient may likely require additional visits within the next couple of weeks.  This will be done on as-needed basis    Diagnoses:      1. Segmental and somatic dysfunction of sacral region    2. Segmental and somatic dysfunction of pelvic region    3. Segmental and somatic dysfunction of thoracic region    4. Segmental and somatic dysfunction of cervical region    5. Degeneration of cervical intervertebral disc    6. Chronic low back pain, unspecified back pain laterality, unspecified whether sciatica present    7. Pain in thoracic spine        Patient's condition:  Patient had restrictions pre-manipulation and Patient symptoms are worsed due to unknown.    Treatment effectiveness:  Post manipulation there is better intersegmental movement and Patient claims to feel looser post manipulation      Procedures:  CMT:  99535 Chiropractic manipulative treatment 3-4 regions performed   Cervical: Diversified, C6, Supine  Thoracic: Diversified, T4, Prone  Pelvis: Diversified, PSIS Right , Side posture, Drop table with modified josé miguel technique, sacrum, prone    Modalities:  03911: MSTM:  To  left sacrotuberous ligament:   for 3 min  60192: Acupuncture, for 15 minutes:  Points:  Bl 10, 22, 23, 24, 25, 46, 47, 48, GB 20, GV 8, 9, 10  For 15 minutes    Therapeutic procedures:  None  Deferred to physical therapy    Response to Treatment  Reduction in symptoms as reported by patient    Prognosis: Good    Progress towards Goals: acute exacerbation     Recommendations:    Instructions: monitor symptoms  closely    Follow-up:  Return to care if symptoms persist.

## 2023-11-13 ENCOUNTER — THERAPY VISIT (OUTPATIENT)
Dept: CHIROPRACTIC MEDICINE | Facility: OTHER | Age: 63
End: 2023-11-13
Attending: CHIROPRACTOR
Payer: COMMERCIAL

## 2023-11-13 VITALS — OXYGEN SATURATION: 92 % | HEART RATE: 52 BPM | TEMPERATURE: 97.7 F | RESPIRATION RATE: 18 BRPM

## 2023-11-13 DIAGNOSIS — M99.04 SEGMENTAL AND SOMATIC DYSFUNCTION OF SACRAL REGION: Primary | ICD-10-CM

## 2023-11-13 DIAGNOSIS — M99.02 SEGMENTAL AND SOMATIC DYSFUNCTION OF THORACIC REGION: ICD-10-CM

## 2023-11-13 DIAGNOSIS — M54.50 CHRONIC LOW BACK PAIN, UNSPECIFIED BACK PAIN LATERALITY, UNSPECIFIED WHETHER SCIATICA PRESENT: ICD-10-CM

## 2023-11-13 DIAGNOSIS — M54.6 PAIN IN THORACIC SPINE: ICD-10-CM

## 2023-11-13 DIAGNOSIS — G89.29 CHRONIC LOW BACK PAIN, UNSPECIFIED BACK PAIN LATERALITY, UNSPECIFIED WHETHER SCIATICA PRESENT: ICD-10-CM

## 2023-11-13 DIAGNOSIS — M99.01 SEGMENTAL AND SOMATIC DYSFUNCTION OF CERVICAL REGION: ICD-10-CM

## 2023-11-13 DIAGNOSIS — M50.30 DEGENERATION OF CERVICAL INTERVERTEBRAL DISC: ICD-10-CM

## 2023-11-13 PROCEDURE — 97810 ACUP 1/> WO ESTIM 1ST 15 MIN: CPT | Performed by: CHIROPRACTOR

## 2023-11-13 PROCEDURE — 98941 CHIROPRACT MANJ 3-4 REGIONS: CPT | Mod: AT | Performed by: CHIROPRACTOR

## 2023-11-13 NOTE — PROGRESS NOTES
Neck is constantly stiff with decreased ROM. Having moderate frequent headaches. 7/10 W24 9/10. Using ice, heat, U/S, and stretches. These are providing an increase in ROM. Right lower back is constantly sore with intermittent lock up feeling and stabbing. 9/10 W24 10/10. Using ice, heat, U/S, and stretches. These are providing a decrease in pain.  Юлияmani Malhotrajyothi on 11/13/2023 at 2:28 PM    Reviewed by EW    Visit #:  4    Subjective:  Adin Madrid is a 63 year old male who is seen in f/u up for:        Segmental and somatic dysfunction of sacral region  Segmental and somatic dysfunction of thoracic region  Segmental and somatic dysfunction of cervical region  Degeneration of cervical intervertebral disc  Chronic low back pain, unspecified back pain laterality, unspecified whether sciatica present  Pain in thoracic spine.     Since last visit on 11/9/2023,  Adin Madrid reports: Symptoms showing some improvement since last encounter.  Reports home exercises provided our last encounter also beneficial.    (DVPRS) Pain Rating Score : 9-Can't bear the pain unable to do anything (W24 10/10) (11/13/23 1425)     Objective:  The following was observed:  Pulse 52   Temp 97.7  F (36.5  C) (Tympanic)   Resp 18   SpO2 92%      P: palpatory tenderness PSIS bilaterally, T10, T4, C4 on left:    A: static palpation demonstrates intersegmental asymmetry , cervical, thoracic, pelvis  R: motion palpation notes restricted motion, C4 , T4 , T10, and Sacrum   T: muscle spasm at level(s):  Moderate lumbar paraspinals bilaterally, mild-moderate quadratus lumborum bilaterally, thoracic paraspinals and upper trapezius mild/moderate bilaterally, mild spasms cervical paraspinals bilaterally left greater than right:      Segmental spinal dysfunction/restrictions found at:  :  C4 Left lateral flexion restricted and Extension restriction  T4 Right lateral flexion restricted and Extension restriction  T10 Left lateral flexion restricted and  Extension restriction  Sacrum Extension restriction.      Assessment: Patient is showing some signs of progress, he is showing decreased signs of spasm and less radiculopathy of the lower extremities.  In efforts to help further patient progress battlefield acupuncture was applied today.  Patient was provided with home care guidelines for ASP needle placement.  Continue to follow-up with patient if needed at this time.    Diagnoses:      1. Segmental and somatic dysfunction of sacral region    2. Segmental and somatic dysfunction of thoracic region    3. Segmental and somatic dysfunction of cervical region    4. Degeneration of cervical intervertebral disc    5. Chronic low back pain, unspecified back pain laterality, unspecified whether sciatica present    6. Pain in thoracic spine        Patient's condition:  Patient had restrictions pre-manipulation    Treatment effectiveness:  Post manipulation there is better intersegmental movement and Patient claims to feel looser post manipulation      Procedures:  CMT:  51758 Chiropractic manipulative treatment 3-4 regions performed   Cervical: Diversified, C4, Supine  Thoracic: Diversified, T4, T10, Prone  Pelvis: Diversified, Sacrum , Side posture    Modalities:  45594: Acupuncture, for 15 minutes:  Points:  BL 10 (L) GB 20(L) 28, 23, 24, 25, 26, 46, 47, 48  GV 5, 6, 8, 9  For 15 minutes  Titanium ASP needles placed at cingulate gyrus and thalamus points bilaterally    Therapeutic procedures:  None    Response to Treatment  Reduction in symptoms as reported by patient    Prognosis: Good    Progress towards Goals: Patient is making progress towards the goal.     Recommendations:    Instructions: Monitor symptoms closely    Follow-up:  Return to care if symptoms persist.

## 2023-11-21 ENCOUNTER — THERAPY VISIT (OUTPATIENT)
Dept: PHYSICAL THERAPY | Facility: OTHER | Age: 63
End: 2023-11-21
Attending: INTERNAL MEDICINE
Payer: COMMERCIAL

## 2023-11-21 DIAGNOSIS — M54.16 LUMBAR RADICULOPATHY: Primary | ICD-10-CM

## 2023-11-21 DIAGNOSIS — M54.12 CERVICAL RADICULOPATHY: ICD-10-CM

## 2023-11-21 PROCEDURE — 97140 MANUAL THERAPY 1/> REGIONS: CPT | Mod: GP,CQ

## 2023-11-28 ENCOUNTER — THERAPY VISIT (OUTPATIENT)
Dept: PHYSICAL THERAPY | Facility: OTHER | Age: 63
End: 2023-11-28
Attending: INTERNAL MEDICINE
Payer: COMMERCIAL

## 2023-11-28 DIAGNOSIS — M54.12 CERVICAL RADICULOPATHY: ICD-10-CM

## 2023-11-28 DIAGNOSIS — M54.16 LUMBAR RADICULOPATHY: Primary | ICD-10-CM

## 2023-11-28 PROCEDURE — 97110 THERAPEUTIC EXERCISES: CPT | Mod: GP

## 2023-11-28 PROCEDURE — 97140 MANUAL THERAPY 1/> REGIONS: CPT | Mod: GP

## 2023-11-30 ENCOUNTER — THERAPY VISIT (OUTPATIENT)
Dept: PHYSICAL THERAPY | Facility: OTHER | Age: 63
End: 2023-11-30
Attending: INTERNAL MEDICINE
Payer: COMMERCIAL

## 2023-11-30 DIAGNOSIS — M54.12 CERVICAL RADICULOPATHY: ICD-10-CM

## 2023-11-30 DIAGNOSIS — M54.16 LUMBAR RADICULOPATHY: Primary | ICD-10-CM

## 2023-11-30 PROCEDURE — 97140 MANUAL THERAPY 1/> REGIONS: CPT | Mod: GP

## 2023-12-04 ENCOUNTER — THERAPY VISIT (OUTPATIENT)
Dept: PHYSICAL THERAPY | Facility: OTHER | Age: 63
End: 2023-12-04
Attending: INTERNAL MEDICINE
Payer: COMMERCIAL

## 2023-12-04 DIAGNOSIS — M54.16 LUMBAR RADICULOPATHY: Primary | ICD-10-CM

## 2023-12-04 DIAGNOSIS — M54.12 CERVICAL RADICULOPATHY: ICD-10-CM

## 2023-12-04 DIAGNOSIS — M76.31 IT BAND SYNDROME, RIGHT: ICD-10-CM

## 2023-12-04 PROCEDURE — 97140 MANUAL THERAPY 1/> REGIONS: CPT | Mod: GP,CQ

## 2023-12-04 PROCEDURE — 97110 THERAPEUTIC EXERCISES: CPT | Mod: GP,CQ

## 2023-12-07 ENCOUNTER — THERAPY VISIT (OUTPATIENT)
Dept: PHYSICAL THERAPY | Facility: OTHER | Age: 63
End: 2023-12-07
Attending: INTERNAL MEDICINE
Payer: COMMERCIAL

## 2023-12-07 DIAGNOSIS — M54.12 CERVICAL RADICULOPATHY: ICD-10-CM

## 2023-12-07 DIAGNOSIS — M54.16 LUMBAR RADICULOPATHY: Primary | ICD-10-CM

## 2023-12-07 PROCEDURE — 97140 MANUAL THERAPY 1/> REGIONS: CPT | Mod: GP,CQ

## 2023-12-12 ENCOUNTER — THERAPY VISIT (OUTPATIENT)
Dept: PHYSICAL THERAPY | Facility: OTHER | Age: 63
End: 2023-12-12
Attending: INTERNAL MEDICINE
Payer: COMMERCIAL

## 2023-12-12 DIAGNOSIS — M54.16 LUMBAR RADICULOPATHY: Primary | ICD-10-CM

## 2023-12-12 DIAGNOSIS — M54.12 CERVICAL RADICULOPATHY: ICD-10-CM

## 2023-12-12 PROCEDURE — 97140 MANUAL THERAPY 1/> REGIONS: CPT | Mod: GP

## 2023-12-12 PROCEDURE — 97110 THERAPEUTIC EXERCISES: CPT | Mod: GP

## 2023-12-12 NOTE — PROGRESS NOTES
12/12/23 1549   Appointment Info   Signing clinician's name / credentials Devante Torre PT   Visits Used 9   Medical Diagnosis Cervical radiculopathy.  Lumbar radiculopathy. Right  IT band syndrome.   PT Tx Diagnosis Mobility impairment due to neck, low back and hip pain and muscle strength, length imbalance   Precautions/Limitations None   Other pertinent information 1/10   Progress Note/Certification   Onset of illness/injury or Date of Surgery 09/19/23  (Date of referral.  This is a chronic pain diagnosis)   Therapy Frequency 1-2 times per week   Predicted Duration 8 weeks   Progress Note Due Date 11/17/23   Supervision   PT Assistant Visit Number 4       Present No   GOALS   PT Goals 2;3   PT Goal 1   Goal Identifier Pain   Goal Description Patient will report that he is able to complete 80% of all tasks at work and at home without limitation due to neck or low back pain at least 4-5 of 7 days/week without limitation due to neck, back or hip pain.  Patient will report he is able to walk for up to 2 miles per episode 3-4 times per week as desired without limitation due to low back and/or hip pain.  Patient will report he is able to drive more safely with improved range of motion without limitation due to cervical pain.  These goals will be accomplished within 8 to 10 weeks   Goal Progress Reports that he continues to experience episodes of either back or neck pain after work tasks that include lifting, carrying.   Target Date 12/16/23   PT Goal 2   Goal Identifier Mobility   Goal Description Patient will demonstrate improved cervical mobility to at least 50 degrees of rotation bilaterally, lateral flexion in the cervical region increased to at least 35 degrees bilaterally and cervical extension increased to 40 degrees and flexion to 40 degrees.  Improved cervical mobility will allow the patient to drive more safely.  He also be able to complete tasks at work and at home which require  movement of the neck to this end range without limitation or restriction.  Patient will be able to complete all necessary tasks at work and at home without limitation due to limited cervical mobility in 8 to 10 weeks   Goal Progress Noted improvement in LE flexibility especially hamstrings which are now WFL. Cervical rotation mobility has improved to 328 deg R , 44 deg L. Lateral flexion has improved to 23 deg right and 25 deg left.   Target Date 12/16/23   PT Goal 3   Goal Identifier Home exercise program   Goal Description Patient will develop consistency with an effective home exercise program to restore and maintain functional cervical range of motion, core strength, hip mobility and lumbopelvic stability.  He will be able to complete this program at least 3 to 4 days/week.  Completion of this program will allow patient to self treat his chronic pain issues and allow increased freedom of motion and the ability to carry out all desired tasks with out limitation.  Patient will achieve this goal in 8 to 10 weeks   Goal Progress Adin is doing well with his current exercise program. Will need to push hip strengthening.   Target Date 12/16/23   Subjective Report   Subjective Report Reports that today pain is right side.   Objective Measures   Objective Measures Objective Measure 1;Objective Measure 2;Objective Measure 3   Objective Measure 1   Objective Measure Palpation   Details 2-3/4 tenderness of the right L3-4 and L4-5 facet joiunts and associated soft tissues.   Objective Measure 2   Objective Measure Lumbar/hip range of motion   Details Note reduced right rotation and lateral flexion. POS quadrant sign. NEG SLR   Objective Measure 3   Objective Measure Strength   Details Note significant weakness in the right hip EROT and abd compared to left.   PT Modalities   PT Modalities Mechanical Traction   Mechanical Traction   Treatment Detail Instruction in use of mechanical home traction device. [employer] Trac.    Traction -Type Home   Type Progressive static   Protocols joint segment w/guarding   Duration 15 min   Max poundage 24   Patient Response/Progress Tolerated well. Patient reports improved neck comfort after treatment   Treatment Interventions (PT)   Interventions Therapeutic Procedure/Exercise;Manual Therapy   Therapeutic Procedure/Exercise   Therapeutic Procedures: strength, endurance, ROM, flexibillity minutes (69145) 20   Ther Proc 1 Therapeutic exercise instruction   Ther Proc 1 - Details ADDED/ Instructed 1) SL clams with green band x 15 , SL hip abd with 1# x 10-15Hooklying: lower trunk rotation B x5, piriformis stretch B 3 x30 second hold. QL stretch B 3 x30 second hold, pelvic tilts x10, posterior pelvic tilt with marching B x10, posterior pelvic tilt with SLR lowering B x10 (SLR was too challenging).   Skilled Intervention Yes instruction and demonstration   Patient Response/Progress Patient was able to complete the instructed exercises with minimal to moderate verbal and tactile cues for performance   Manual Therapy   Manual Therapy: Mobilization, MFR, MLD, friction massage minutes (26421) 25   Manual Therapy 1 Mobilization   Manual Therapy 1 - Details extension mobs, UPA and CPA oscillations of right L3-4 and L4-5 facet joint. DTR of  B QL   Skilled Intervention Yes. Manual treatment   Education   Learner/Method Patient   Plan   Home program As above.  Patient's home exercise program will be reviewed and revised as indicated   Plan for next session continue to push hip and core strength. Focus on improving stabilit. Treat neck and back pain as needed   Total Session Time   Timed Code Treatment Minutes 45   Total Treatment Time (sum of timed and untimed services) 45         PLAN  Continue therapy per stated plan above    Beginning/End Dates of Progress Note Reporting Period:    to 12/12/2023    Referring Provider:  Adin Ruano

## 2023-12-14 ENCOUNTER — THERAPY VISIT (OUTPATIENT)
Dept: PHYSICAL THERAPY | Facility: OTHER | Age: 63
End: 2023-12-14
Attending: INTERNAL MEDICINE
Payer: COMMERCIAL

## 2023-12-14 DIAGNOSIS — M54.16 LUMBAR RADICULOPATHY: Primary | ICD-10-CM

## 2023-12-14 DIAGNOSIS — M54.12 CERVICAL RADICULOPATHY: ICD-10-CM

## 2023-12-14 PROCEDURE — 97140 MANUAL THERAPY 1/> REGIONS: CPT | Mod: GP,CQ

## 2023-12-14 PROCEDURE — 97110 THERAPEUTIC EXERCISES: CPT | Mod: GP,CQ

## 2023-12-19 ENCOUNTER — THERAPY VISIT (OUTPATIENT)
Dept: PHYSICAL THERAPY | Facility: OTHER | Age: 63
End: 2023-12-19
Attending: INTERNAL MEDICINE
Payer: COMMERCIAL

## 2023-12-19 DIAGNOSIS — M54.16 LUMBAR RADICULOPATHY: Primary | ICD-10-CM

## 2023-12-19 DIAGNOSIS — M54.12 CERVICAL RADICULOPATHY: ICD-10-CM

## 2023-12-19 PROCEDURE — 97110 THERAPEUTIC EXERCISES: CPT | Mod: GP

## 2023-12-28 ENCOUNTER — THERAPY VISIT (OUTPATIENT)
Dept: PHYSICAL THERAPY | Facility: OTHER | Age: 63
End: 2023-12-28
Attending: INTERNAL MEDICINE
Payer: COMMERCIAL

## 2023-12-28 DIAGNOSIS — M54.12 CERVICAL RADICULOPATHY: ICD-10-CM

## 2023-12-28 DIAGNOSIS — M54.16 LUMBAR RADICULOPATHY: Primary | ICD-10-CM

## 2023-12-28 PROCEDURE — 97140 MANUAL THERAPY 1/> REGIONS: CPT | Mod: GP

## 2023-12-28 NOTE — PROGRESS NOTES
12/28/23 1456   Appointment Info   Signing clinician's name / credentials Devante Torre PT   Visits Used 12   Medical Diagnosis Cervical radiculopathy.  Lumbar radiculopathy. Right  IT band syndrome.   PT Tx Diagnosis Mobility impairment due to neck, low back and hip pain and muscle strength, length imbalance   Precautions/Limitations None   Other pertinent information 4/10   Progress Note/Certification   Onset of illness/injury or Date of Surgery 09/19/23  (Date of referral.  This is a chronic pain diagnosis)   Therapy Frequency 1-2 times per week   Predicted Duration 8 weeks   Progress Note Due Date 11/17/23   Supervision   Assistant Supervision PTA visit observed, intervention appropriate, plan of care reviewed and remains appropriate   PT Assistant Visit Number 5       Present No   GOALS   PT Goals 2;3   PT Goal 1   Goal Identifier Pain   Goal Description Patient will report that he is able to complete 80% of all tasks at work and at home without limitation due to neck or low back pain at least 4-5 of 7 days/week without limitation due to neck, back or hip pain.  Patient will report he is able to walk for up to 2 miles per episode 3-4 times per week as desired without limitation due to low back and/or hip pain.  Patient will report he is able to drive more safely with improved range of motion without limitation due to cervical pain.  These goals will be accomplished within 8 to 10 weeks   Goal Progress Reports that he continues to experience episodes of either back or neck pain after work tasks that include lifting, carrying. Patients only limitation in gait is due to right lateral hip discomfort and residual left hip weakness. He will continue with his HEP to fully achieve his goals.   Target Date 12/16/23   Date Met 12/28/23   PT Goal 2   Goal Identifier Mobility   Goal Description Patient will demonstrate improved cervical mobility to at least 50 degrees of rotation bilaterally,  lateral flexion in the cervical region increased to at least 35 degrees bilaterally and cervical extension increased to 40 degrees and flexion to 40 degrees.  Improved cervical mobility will allow the patient to drive more safely.  He also be able to complete tasks at work and at home which require movement of the neck to this end range without limitation or restriction.  Patient will be able to complete all necessary tasks at work and at home without limitation due to limited cervical mobility in 8 to 10 weeks   Goal Progress Additional improvement in cervical rotation mobility. Now has improved to 44 deg R , 50 deg L. Lateral flexion has improved to 23 deg right and 25 deg left. Patients only limitation in gait is due to right lateral hip discomfort and residual left hip weakness. He will continue with his HEP to fully achieve his goals.   Target Date 12/16/23   Date Met 12/28/23   PT Goal 3   Goal Identifier Home exercise program   Goal Description Patient will develop consistency with an effective home exercise program to restore and maintain functional cervical range of motion, core strength, hip mobility and lumbopelvic stability.  He will be able to complete this program at least 3 to 4 days/week.  Completion of this program will allow patient to self treat his chronic pain issues and allow increased freedom of motion and the ability to carry out all desired tasks with out limitation.  Patient will achieve this goal in 8 to 10 weeks   Goal Progress Adin is doing well with his current exercise program. Will need to push hip strengthening.   Target Date 12/16/23   Date Met 12/28/23   Objective Measures   Objective Measures Objective Measure 1;Objective Measure 2;Objective Measure 3   Objective Measure 1   Objective Measure Palpation   Objective Measure 2   Objective Measure Lumbar/hip range of motion   Details Now negative quadrant sign. Equal motion left to right   Objective Measure 3   Objective Measure  Strength   Details Note persistent weakness in the right hip EROT and abd compared to left.   PT Modalities   PT Modalities Mechanical Traction   Mechanical Traction   Treatment Detail Instruction in use of mechanical home traction device. [employer].   Traction -Type Home   Type Progressive static   Protocols joint segment w/guarding   Duration 15 min   Max poundage 24   Patient Response/Progress Tolerated well. Patient reports improved neck comfort after treatment   Treatment Interventions (PT)   Interventions Therapeutic Procedure/Exercise;Manual Therapy   Therapeutic Procedure/Exercise   Ther Proc 1 Therapeutic exercise instruction   Ther Proc 1 - Details Reviewed abdominal sets in regard to breathing.  Continue the following bridges with green band around knees to facilitate hip abd, Bridges with ball squeeze to facilitate hip add. Planks on elbows, full planks, side planks all with glute contraction anf PPT. Continue the following; 1) SL clams with green band x 15 , SL hip abd 2-3 x10-15. Hooklying: lower trunk rotation B x10, piriformis stretch B 3 x30 second hold. QL stretch B 3 x30 second hold, pelvic tilts x10, posterior pelvic tilt with marching B x10, posterior pelvic tilt with SLR B x10. Continue  standing ITB stretch . Single knee to chest R 2 x30 second hold.   Skilled Intervention Yes instruction and demonstration   Patient Response/Progress Patient is independent with his HEP.   Manual Therapy   Manual Therapy: Mobilization, MFR, MLD, friction massage minutes (35353) 35   Manual Therapy 1 Mobilization   Manual Therapy 1 - Details SOR and passive cervical stretching with STM to levator, UT. Passive cervical rotation with overpressure. STM to L low back near SI joint.   Skilled Intervention Yes. Manual treatment   Education   Learner/Method Patient   Plan   Home program As above.  Patient's home exercise program will be reviewed and revised as indicated   Plan for next session Patient will be  discharged from clinical PT at this time. He will continue with his HEP and call with any questions   Total Session Time   Timed Code Treatment Minutes 35   Total Treatment Time (sum of timed and untimed services) 35         DISCHARGE  Reason for Discharge: Patient has made excellent progress towards all goals.    Equipment Issued: None    Discharge Plan: Patient to continue home program.    Referring Provider:  Adin Ruano

## 2024-02-15 ENCOUNTER — TRANSFERRED RECORDS (OUTPATIENT)
Dept: HEALTH INFORMATION MANAGEMENT | Facility: OTHER | Age: 64
End: 2024-02-15
Payer: COMMERCIAL

## 2024-02-15 LAB — RETINOPATHY: NEGATIVE

## 2024-03-01 ENCOUNTER — MYC MEDICAL ADVICE (OUTPATIENT)
Dept: FAMILY MEDICINE | Facility: OTHER | Age: 64
End: 2024-03-01
Payer: COMMERCIAL

## 2024-03-01 DIAGNOSIS — Z12.11 COLON CANCER SCREENING: Primary | ICD-10-CM

## 2024-03-12 ENCOUNTER — MYC MEDICAL ADVICE (OUTPATIENT)
Dept: PEDIATRICS | Facility: OTHER | Age: 64
End: 2024-03-12
Payer: COMMERCIAL

## 2024-03-12 DIAGNOSIS — F39 MOOD DISORDER (H): Primary | ICD-10-CM

## 2024-03-14 RX ORDER — PAROXETINE 20 MG/1
TABLET, FILM COATED ORAL
Qty: 21 TABLET | Refills: 0 | Status: SHIPPED | OUTPATIENT
Start: 2024-03-14 | End: 2024-07-18

## 2024-03-14 NOTE — TELEPHONE ENCOUNTER
ICD-10-CM    1. Mood disorder (H24)  F39 PARoxetine (PAXIL) 20 MG tablet          -- Okay to taper off, script sent.    Signed, Adin Ruano MD, FAAP, FACP  Internal Medicine & Pediatrics

## 2024-03-14 NOTE — TELEPHONE ENCOUNTER
Dr. Ruano,    Let me know if I should get weaning instructions from [facility].     Fariha Bull RN on 3/14/2024 at 4:33 PM

## 2024-03-15 ENCOUNTER — THERAPY VISIT (OUTPATIENT)
Dept: CHIROPRACTIC MEDICINE | Facility: OTHER | Age: 64
End: 2024-03-15
Attending: CHIROPRACTOR
Payer: OTHER MISCELLANEOUS

## 2024-03-15 VITALS
RESPIRATION RATE: 18 BRPM | DIASTOLIC BLOOD PRESSURE: 62 MMHG | OXYGEN SATURATION: 93 % | TEMPERATURE: 97.7 F | SYSTOLIC BLOOD PRESSURE: 105 MMHG

## 2024-03-15 DIAGNOSIS — M50.30 DEGENERATION OF CERVICAL INTERVERTEBRAL DISC: ICD-10-CM

## 2024-03-15 DIAGNOSIS — M99.03 SEGMENTAL AND SOMATIC DYSFUNCTION OF LUMBAR REGION: ICD-10-CM

## 2024-03-15 DIAGNOSIS — M54.50 CHRONIC LOW BACK PAIN, UNSPECIFIED BACK PAIN LATERALITY, UNSPECIFIED WHETHER SCIATICA PRESENT: ICD-10-CM

## 2024-03-15 DIAGNOSIS — M99.04 SEGMENTAL AND SOMATIC DYSFUNCTION OF SACRAL REGION: Primary | ICD-10-CM

## 2024-03-15 DIAGNOSIS — G89.29 CHRONIC LOW BACK PAIN, UNSPECIFIED BACK PAIN LATERALITY, UNSPECIFIED WHETHER SCIATICA PRESENT: ICD-10-CM

## 2024-03-15 DIAGNOSIS — M99.01 SEGMENTAL AND SOMATIC DYSFUNCTION OF CERVICAL REGION: ICD-10-CM

## 2024-03-15 PROCEDURE — 97810 ACUP 1/> WO ESTIM 1ST 15 MIN: CPT | Performed by: CHIROPRACTOR

## 2024-03-15 PROCEDURE — 98940 CHIROPRACT MANJ 1-2 REGIONS: CPT | Mod: AT | Performed by: CHIROPRACTOR

## 2024-03-15 PROCEDURE — 99212 OFFICE O/P EST SF 10 MIN: CPT | Mod: 25 | Performed by: CHIROPRACTOR

## 2024-03-15 NOTE — PROGRESS NOTES
Bilateral neck flared last night and is constantly aching and stiff. Stretches, hot packs, and tylenol provide temporary relief. Pain rated 6/10 W24 9/10.  Lower right back flared last night and is constantly stabbing and locking. Hot packs, stretches, and tylenol provide temporary relief. Pain rated 9/10 W24 10/10  Yony Nath DC on 3/15/2024 at 11:06 AM     Reviewed by EW    Visit #:  1  Re-assessment    Subjective:  Adin Madrid is a 64 year old male who is seen in f/u up for:        Segmental and somatic dysfunction of sacral region  Segmental and somatic dysfunction of lumbar region  Degeneration of cervical intervertebral disc  Segmental and somatic dysfunction of cervical region  Chronic low back pain, unspecified back pain laterality, unspecified whether sciatica present.     Since last visit on 11/13/2023,  Adin Madrid reports: Symptoms aggravated yesterday while at work.  No specific injury or overuse around time of onset.  Denies any radiating symptoms of the upper or lower extremities.  Patient has been doing his best with home exercises from physical therapy and has been doing well since then.  No red flags consistent with cauda equina.    Also having some neck and upper back concerns.      (DVPRS) Pain Rating Score : 9-Can't bear the pain unable to do anything (W24 10/10) (03/15/24 1102)     Objective:  The following was observed:  /62 (BP Location: Right arm)   Temp 97.7  F (36.5  C) (Tympanic)   Resp 18   SpO2 93%        3/15/2024    11:10 AM   Neck Disability Index (  Ahmet H. and Varun C. 1991. All rights reserved.; used with permission)   SECTION 1 - PAIN INTENSITY 2   SECTION 2 - PERSONAL CARE 2   SECTION 3 - LIFTING 0   SECTION 4 - READING 1   SECTION 5 - HEADACHES 0   SECTION 6 - CONCENTRATION 0   SECTION 7 - WORK 2   SECTION 8 - DRIVING 3   SECTION 9 - SLEEPING 2   SECTION 10 - RECREATION 2   Count 10   Sum 14   Raw Score: /50 14   Neck Disability Index Score: (%) 28 %       Cervical AROM: Mild restrictions with lateral flexion bilaterally, flexion and extension are unremarkable, rotation mild restriction    Cervical distraction: +      Oswestry (JOSELYN) Questionnaire        3/15/2024    11:07 AM   OSWESTRY DISABILITY INDEX   Count 9   Sum 24   Oswestry Score (%) 53.33 %       Thoracic/lumbar AROM: Moderate restriction with rotation greater on left compared to right producing low back pain.    Slumps: -right, -left    P: palpatory tenderness L5/S1, mild of the cervical spine paraspinal musculature bilaterally:    A: static palpation demonstrates intersegmental asymmetry , lumbar, pelvis  R: motion palpation notes restricted motion, C2 , C7 , L5 , and Sacrum   T: Spasms apparent lumbar paraspinals and quadratus lumborum bilaterally mild, hypertonicity throughout cervical paraspinal musculature bilaterally    Segmental spinal dysfunction/restrictions found at:  :  C2 Left lateral flexion restricted and Extension restriction  C7 Right lateral flexion restricted and Extension restriction  L5 Left lateral flexion restricted and Extension restriction  Sacrum Right lateral flexion restricted and Extension restriction.      Assessment: Acute flareup of chronic concerns.  Patient has been under care in the past and usually responds favorably with combination of chiropractic and acupuncture.  Patient has gone through a recent course of physical therapy successfully.  Has been doing stretches and exercises at home with some level of success.  Suspect additional visits will be beneficial for the patient within the next couple of weeks which will be done on as-needed basis as patient's work schedule can make it difficult for us to anticipate follow-up care.    Diagnoses:      1. Segmental and somatic dysfunction of sacral region    2. Segmental and somatic dysfunction of lumbar region    3. Degeneration of cervical intervertebral disc    4. Segmental and somatic dysfunction of cervical region    5.  Chronic low back pain, unspecified back pain laterality, unspecified whether sciatica present        Patient's condition:  Patient had restrictions pre-manipulation    Treatment effectiveness:  Post manipulation there is better intersegmental movement and Patient claims to feel looser post manipulation      Procedures:  E/M 60[address]    CMT:  29234 Chiropractic manipulative treatment 1-2 regions performed   Lumbar: Diversified, L5, Side posture  Pelvis: Diversified, Sacrum , Side posture    Modalities:  67931: Acupuncture, for 15 minutes:  Points:  Bl 10, 22, 23, 24, 25, 26, 27, 46, 47, 48, GB 20, SI 9  For 15 minutes  Manual therapy/mobilization and distraction of the cervical spine.    Therapeutic procedures:  None    Response to Treatment  Reduction in symptoms as reported by patient    Prognosis: Good    Progress towards Goals: Reduce pain by up to 50% within 4 weeks  Improve disability index scores 20-30% within 4 weeks  Improve pain-free AROM within 4 weeks     Recommendations:    Instructions:ice 20 minutes every other hour as needed and heat 15 minutes every other hour as needed    Follow-up:  Return to care if symptoms persist.

## 2024-03-20 ENCOUNTER — THERAPY VISIT (OUTPATIENT)
Dept: CHIROPRACTIC MEDICINE | Facility: OTHER | Age: 64
End: 2024-03-20
Attending: CHIROPRACTOR
Payer: OTHER MISCELLANEOUS

## 2024-03-20 VITALS — OXYGEN SATURATION: 96 % | TEMPERATURE: 97 F | RESPIRATION RATE: 18 BRPM

## 2024-03-20 DIAGNOSIS — M99.03 SEGMENTAL AND SOMATIC DYSFUNCTION OF LUMBAR REGION: ICD-10-CM

## 2024-03-20 DIAGNOSIS — M99.04 SEGMENTAL AND SOMATIC DYSFUNCTION OF SACRAL REGION: Primary | ICD-10-CM

## 2024-03-20 DIAGNOSIS — M50.30 DEGENERATION OF CERVICAL INTERVERTEBRAL DISC: ICD-10-CM

## 2024-03-20 DIAGNOSIS — M99.02 SEGMENTAL AND SOMATIC DYSFUNCTION OF THORACIC REGION: ICD-10-CM

## 2024-03-20 DIAGNOSIS — M54.50 RIGHT LOW BACK PAIN, UNSPECIFIED CHRONICITY, UNSPECIFIED WHETHER SCIATICA PRESENT: ICD-10-CM

## 2024-03-20 DIAGNOSIS — M99.01 SEGMENTAL AND SOMATIC DYSFUNCTION OF CERVICAL REGION: ICD-10-CM

## 2024-03-20 PROCEDURE — 97810 ACUP 1/> WO ESTIM 1ST 15 MIN: CPT | Performed by: CHIROPRACTOR

## 2024-03-20 PROCEDURE — 98941 CHIROPRACT MANJ 3-4 REGIONS: CPT | Mod: AT | Performed by: CHIROPRACTOR

## 2024-03-20 NOTE — PROGRESS NOTES
Right lower back is constantly stabbing with pain rated 7/10 W24 7/10. Uses hot packs, Tylenol, and stretches to help minimize pain and increase ROM.   Cale Coppola on 3/20/2024 at 11:12 AM     Reviewed by EW    Visit #:  2    Subjective:  Adin Madrid is a 64 year old male who is seen in f/u up for:        Segmental and somatic dysfunction of sacral region  Segmental and somatic dysfunction of lumbar region  Right low back pain, unspecified chronicity, unspecified whether sciatica present  Segmental and somatic dysfunction of cervical region  Segmental and somatic dysfunction of thoracic region  Degeneration of cervical intervertebral disc.     Since last visit on 3/15/2024,  Adin Madrid reports: Symptoms showing a little bit of improvement at this time.  Patient was able to go to retreat in [place] this weekend.  Back still sore but does feel that range of motion is improving.      (DVPRS) Pain Rating Score : 7-Focus of attention, prevents doing daily activities (W24 7/10) (03/20/24 1108)     Objective:  The following was observed:  Temp 97  F (36.1  C) (Tympanic)   Resp 18   SpO2 96%      P: palpatory tenderness right PSIS, C2, C7: Mild left side L5  A: static palpation demonstrates intersegmental asymmetry , cervical, thoracic, lumbar, pelvis  R: motion palpation notes restricted motion, C2 , C7 , T4 , L5 , and Sacrum   T: muscle spasm at level(s):  Marked of the lumbar paraspinals and quadratus lumborum bilaterally greater on right, cervical paraspinals bilaterally:      Segmental spinal dysfunction/restrictions found at:  :  C2 Right lateral flexion restricted and Extension restriction  C7 Left lateral flexion restricted and Extension restriction  T4 Right lateral flexion restricted and Extension restriction  L5 Left lateral flexion restricted and Extension restriction  Sacrum Right lateral flexion restricted and Extension restriction.      Assessment: Symptoms are showing some signs of progress.   Anticipate additional visit would be beneficial later this week if not next week.    Diagnoses:      1. Segmental and somatic dysfunction of sacral region    2. Segmental and somatic dysfunction of lumbar region    3. Right low back pain, unspecified chronicity, unspecified whether sciatica present    4. Segmental and somatic dysfunction of cervical region    5. Segmental and somatic dysfunction of thoracic region    6. Degeneration of cervical intervertebral disc        Patient's condition:  Patient had restrictions pre-manipulation    Treatment effectiveness:  Post manipulation there is better intersegmental movement and Patient claims to feel looser post manipulation      Procedures:  CMT:  72465 Chiropractic manipulative treatment 3-4 regions performed   Cervical: Modified [place] technique with light diversified to C2, C2, C7 , Supine  Thoracic: Diversified, T4, Prone  Lumbar: Diversified, L5, Side posture  Pelvis: Diversified, Sacrum , Side posture    Modalities:  08920: Acupuncture, for 15 minutes:  Points:  Bl 10, 22, 23, 24, 25, 46, 47, 48, GB 20, 28, SI 9, 15  For 15 minutes    Therapeutic procedures:  None    Response to Treatment  Reduction in symptoms as reported by patient    Prognosis: Good    Progress towards Goals: Patient is making progress towards the goal.     Recommendations:    Instructions: Monitor symptoms and continue with home exercises as tolerated    Follow-up:  Return to care if symptoms persist.

## 2024-03-22 ENCOUNTER — THERAPY VISIT (OUTPATIENT)
Dept: CHIROPRACTIC MEDICINE | Facility: OTHER | Age: 64
End: 2024-03-22
Attending: CHIROPRACTOR
Payer: OTHER MISCELLANEOUS

## 2024-03-22 VITALS — HEART RATE: 52 BPM | OXYGEN SATURATION: 97 % | TEMPERATURE: 96.3 F

## 2024-03-22 DIAGNOSIS — M99.03 SEGMENTAL AND SOMATIC DYSFUNCTION OF LUMBAR REGION: ICD-10-CM

## 2024-03-22 DIAGNOSIS — M99.04 SEGMENTAL AND SOMATIC DYSFUNCTION OF SACRAL REGION: Primary | ICD-10-CM

## 2024-03-22 DIAGNOSIS — M54.50 RIGHT LOW BACK PAIN, UNSPECIFIED CHRONICITY, UNSPECIFIED WHETHER SCIATICA PRESENT: ICD-10-CM

## 2024-03-22 DIAGNOSIS — M99.02 SEGMENTAL AND SOMATIC DYSFUNCTION OF THORACIC REGION: ICD-10-CM

## 2024-03-22 DIAGNOSIS — M50.30 DEGENERATION OF CERVICAL INTERVERTEBRAL DISC: ICD-10-CM

## 2024-03-22 DIAGNOSIS — M99.01 SEGMENTAL AND SOMATIC DYSFUNCTION OF CERVICAL REGION: ICD-10-CM

## 2024-03-22 PROCEDURE — 97810 ACUP 1/> WO ESTIM 1ST 15 MIN: CPT | Performed by: CHIROPRACTOR

## 2024-03-22 PROCEDURE — 98941 CHIROPRACT MANJ 3-4 REGIONS: CPT | Mod: AT | Performed by: CHIROPRACTOR

## 2024-03-22 NOTE — PROGRESS NOTES
Right side of neck is constantly limiting and irritating with pain rated 6/10 W24 7/10. Uses heat, stretches, and Tylenol to increase ROM and provide some pain relief.   Right lower back is constantly stabbing with pain rated 8/10 W24 9/10. Uses heat, stretches, and Tylenol to increase ROM and provide some pain relief.   Cale oCppola on 3/22/2024 at 10:23 AM     Reviewed by EW    Visit #:  3    Subjective:  Adin Madrid is a 64 year old male who is seen in f/u up for:        Segmental and somatic dysfunction of sacral region  Segmental and somatic dysfunction of lumbar region  Right low back pain, unspecified chronicity, unspecified whether sciatica present  Segmental and somatic dysfunction of cervical region  Segmental and somatic dysfunction of thoracic region  Degeneration of cervical intervertebral disc.     Since last visit on 3/20/2024,  Adin Madrid reports: Patient is showing signs of progress albeit slowly.  Patient reports that he was quite sore yesterday.  No radicular symptoms at this time.  Patient is curious about other treatment options at this time.      (DVPRS) Pain Rating Score : 8-Awful, hard to do anything (W24 9/10) (03/22/24 1021)     Objective:  The following was observed:  Pulse 52   Temp (!) 96.3  F (35.7  C) (Tympanic)   SpO2 97%      P: palpatory tenderness right PSIS:    A: static palpation demonstrates intersegmental asymmetry , cervical, thoracic, pelvis  R: motion palpation notes restricted motion, C2, T4, L5, and Sacrum  T: muscle spasm at level(s):  Lumbar paraspinals, quadratus lumborum bilaterally, suboccipitals bilaterally:      Segmental spinal dysfunction/restrictions found at:  :  C2 Right lateral flexion restricted and Extension restriction  T4 Right lateral flexion restricted and Extension restriction  L5 Left lateral flexion restricted and Extension restriction  Sacrum Right lateral flexion restricted and Extension restriction.      Assessment: Symptoms are showing  some signs of progress again albeit slowly.  Had brief discussion with patient today regarding other treatment options including but not limited to manual physical therapy with visceral therapist on staff, injections such as trigger point or spinal, medication regiment.  Patient's primary provider was consulted to determine other treatment options.  At this time we will continue with chiropractic acupuncture as this does seem to be providing some level of help.    Diagnoses:      1. Segmental and somatic dysfunction of sacral region    2. Segmental and somatic dysfunction of lumbar region    3. Right low back pain, unspecified chronicity, unspecified whether sciatica present    4. Segmental and somatic dysfunction of cervical region    5. Segmental and somatic dysfunction of thoracic region    6. Degeneration of cervical intervertebral disc        Patient's condition:  Patient had restrictions pre-manipulation    Treatment effectiveness:  Post manipulation there is better intersegmental movement and Patient claims to feel looser post manipulation      Procedures:  CMT:  38594 Chiropractic manipulative treatment 3-4 regions performed   Cervical: Diversified, C2, Supine  Thoracic: Diversified, T4, Prone  Lumbar: Diversified, L5, Prone  Pelvis: Diversified, Sacrum , Side posture    Modalities:  19973: Acupuncture, for 15 minutes:  Points:  Bl 10, 22, 23, 24, 25, 46, 47 48, GB 28, SI 9, GV 6  For 15 minutes    Therapeutic procedures:  None    Response to Treatment  Reduction in symptoms as reported by patient    Prognosis: Good    Progress towards Goals: Patient is making progress towards the goal.     Recommendations:    Instructions: monitor symptoms    Follow-up:  Return to care if symptoms persist.

## 2024-03-25 ENCOUNTER — THERAPY VISIT (OUTPATIENT)
Dept: CHIROPRACTIC MEDICINE | Facility: OTHER | Age: 64
End: 2024-03-25
Attending: CHIROPRACTOR
Payer: OTHER MISCELLANEOUS

## 2024-03-25 VITALS
TEMPERATURE: 97.1 F | HEART RATE: 53 BPM | SYSTOLIC BLOOD PRESSURE: 110 MMHG | OXYGEN SATURATION: 96 % | DIASTOLIC BLOOD PRESSURE: 75 MMHG

## 2024-03-25 DIAGNOSIS — M99.01 SEGMENTAL AND SOMATIC DYSFUNCTION OF CERVICAL REGION: ICD-10-CM

## 2024-03-25 DIAGNOSIS — M99.04 SEGMENTAL AND SOMATIC DYSFUNCTION OF SACRAL REGION: Primary | ICD-10-CM

## 2024-03-25 DIAGNOSIS — M99.03 SEGMENTAL AND SOMATIC DYSFUNCTION OF LUMBAR REGION: ICD-10-CM

## 2024-03-25 DIAGNOSIS — M54.50 RIGHT LOW BACK PAIN, UNSPECIFIED CHRONICITY, UNSPECIFIED WHETHER SCIATICA PRESENT: ICD-10-CM

## 2024-03-25 DIAGNOSIS — M99.02 SEGMENTAL AND SOMATIC DYSFUNCTION OF THORACIC REGION: ICD-10-CM

## 2024-03-25 PROCEDURE — 97810 ACUP 1/> WO ESTIM 1ST 15 MIN: CPT | Performed by: CHIROPRACTOR

## 2024-03-25 PROCEDURE — 98941 CHIROPRACT MANJ 3-4 REGIONS: CPT | Mod: AT | Performed by: CHIROPRACTOR

## 2024-03-25 NOTE — PROGRESS NOTES
Accident occurred on 3/14/2024. Right lower back is constantly sore with pain rated 6/10 W24 8/10. Uses Tylenol, Ibuprofen, ultrasound, and heat to relieve tightness and improve ROM.   Cale Avilesesperanza on 3/25/2024 at 1:57 PM     Reviewed by EW    Visit #:  4/12    Subjective:  Adin Madrid is a 64 year old male who is seen in f/u up for:        Segmental and somatic dysfunction of sacral region  Segmental and somatic dysfunction of lumbar region  Right low back pain, unspecified chronicity, unspecified whether sciatica present  Segmental and somatic dysfunction of thoracic region  Segmental and somatic dysfunction of cervical region.     Since last visit on 3/22/2024,  Adin Madrid reports: Overall symptoms are showing improvement at this time.  No exacerbation of symptoms was noted over the weekend.  Patient still feels that he is using at home treatment strategies more than he would usually do.    Informed today patient symptoms which were first seen by our office on 3/15/24 was due to work comp.  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  States this is when his back pain began.  Initially was understood that symptoms began at work but nothing specific was mentioned.    Patient has been treated by our office in the past for back and neck concerns.  Patient typically does respond favorably with chiropractic care and acupuncture.  Patient has been noting improvement albeit slowly comparatively to what we normally see you in his care.    (DVPRS) Pain Rating Score : 6-Hard to ignore, avoid usual activities (W24 8/10) (03/25/24 1350)     Objective:  The following was observed:  /75 (BP Location: Right arm)   Pulse 53   Temp 97.1  F (36.2  C) (Tympanic)   SpO2 96%        3/15/2024    11:10 AM   Neck Disability Index (  Ahmet H. and Varun C. 1991. All rights reserved.; used with permission)   SECTION 1 - PAIN INTENSITY 2   SECTION 2 -  PERSONAL CARE 2   SECTION 3 - LIFTING 0   SECTION 4 - READING 1   SECTION 5 - HEADACHES 0   SECTION 6 - CONCENTRATION 0   SECTION 7 - WORK 2   SECTION 8 - DRIVING 3   SECTION 9 - SLEEPING 2   SECTION 10 - RECREATION 2   Count 10   Sum 14   Raw Score: /50 14   Neck Disability Index Score: (%) 28 %     No change in score.      Oswestry (JOSELYN) Questionnaire        3/25/2024     1:58 PM   OSWESTRY DISABILITY INDEX   Count 9   Sum 22   Oswestry Score (%) 48.89 %       Showing improvement from 48.89% when initially assessed    Thoracic/lumbar AROM: Moderate restrictions with right rotation, mild restrictions with extension.  All other ranges of motion are generally unremarkable at this time    P: palpatory tenderness right PSIS: Intrascapular T-spine  A: static palpation demonstrates intersegmental asymmetry , cervical, thoracic, pelvis  R: motion palpation notes restricted motion, C2, T4, L5, and Sacrum  T: muscle spasm at level(s):  Lumbar paraspinals, quadratus lumborum bilaterally, suboccipitals bilaterally: Right rhomboids     Segmental spinal dysfunction/restrictions found at:  :  C2 Right lateral flexion restricted and Extension restriction  T4 Right lateral flexion restricted and Extension restriction  L5 Left lateral flexion restricted and Extension restriction  Sacrum Right lateral flexion restricted and Extension restriction.      Assessment: Patient informed me today that symptoms are related to her work comp related injury.  Paperwork has been given to our staff for proper processing.  Initially this was understood to be the case.  Patient did say that symptoms began at work no further details.    Care of patient began on 3/15/2024.  Care consisted of chiropractic adjustments with acupuncture as a passive modality.  This combination of treatments has shown to be effective for patient in the past.    Patient is showing signs of progress both subjectively and objectively.  Pain seems to be reducing in overall  intensity.  Oswestry score also appears to be improving.  Neck disability index however remains unchanged.  At this time suspect up to 12 chiropractic visits over the next 4-6 weeks.  I am hoping that patient's care will begin tapering this week.    Patient has been under our care before for low back and neck concerns.  Patient has responded favorably with course of treatment and recently has also gone through a course of physical therapy which also proved to be effective.  Patient does have home exercises.  We have discussed alternative courses of care including but not limited to medication, injections both trigger point and spinal, return to physical therapy.  At this time we will continue with chiropractic care and acupuncture.  Referrals will be made if patient chooses.    Diagnoses:      1. Segmental and somatic dysfunction of sacral region    2. Segmental and somatic dysfunction of lumbar region    3. Right low back pain, unspecified chronicity, unspecified whether sciatica present    4. Segmental and somatic dysfunction of thoracic region    5. Segmental and somatic dysfunction of cervical region        Patient's condition:  Patient had restrictions pre-manipulation and Patient is noticing a decrease in their symptoms    Treatment effectiveness:  Post manipulation there is better intersegmental movement, Patient claims to feel looser post manipulation, Patients symptoms are getting better, and Swelling is decreasing      Procedures:  CMT:  34080 Chiropractic manipulative treatment 3-4 regions performed   Cervical: Diversified, C2, Supine  Thoracic: Diversified, T4, Prone  Lumbar: Diversified, L5, Side posture  Pelvis: Diversified, Sacrum , Side posture    Modalities:  57000: Acupuncture, for 15 minutes:  Points:  Bl 10, 22, 23, 24, 25, 46, 47, 48, GB 28 SI 9  For 15 minutes    Therapeutic procedures:  None    Response to Treatment  Reduction in symptoms as reported by patient    Prognosis: Good    Progress  towards Goals: Patient is making progress towards the goal.     Recommendations:    Instructions: monitor symptoms    Follow-up:  Return to care in 3 days.

## 2024-03-28 ENCOUNTER — THERAPY VISIT (OUTPATIENT)
Dept: CHIROPRACTIC MEDICINE | Facility: OTHER | Age: 64
End: 2024-03-28
Attending: CHIROPRACTOR
Payer: OTHER MISCELLANEOUS

## 2024-03-28 VITALS — OXYGEN SATURATION: 95 % | HEART RATE: 74 BPM | RESPIRATION RATE: 16 BRPM | TEMPERATURE: 97.7 F

## 2024-03-28 DIAGNOSIS — M99.03 SEGMENTAL AND SOMATIC DYSFUNCTION OF LUMBAR REGION: ICD-10-CM

## 2024-03-28 DIAGNOSIS — M54.50 RIGHT LOW BACK PAIN, UNSPECIFIED CHRONICITY, UNSPECIFIED WHETHER SCIATICA PRESENT: ICD-10-CM

## 2024-03-28 DIAGNOSIS — M99.04 SEGMENTAL AND SOMATIC DYSFUNCTION OF SACRAL REGION: Primary | ICD-10-CM

## 2024-03-28 DIAGNOSIS — M99.02 SEGMENTAL AND SOMATIC DYSFUNCTION OF THORACIC REGION: ICD-10-CM

## 2024-03-28 DIAGNOSIS — M99.01 SEGMENTAL AND SOMATIC DYSFUNCTION OF CERVICAL REGION: ICD-10-CM

## 2024-03-28 DIAGNOSIS — M50.30 DEGENERATION OF CERVICAL INTERVERTEBRAL DISC: ICD-10-CM

## 2024-03-28 PROCEDURE — 98941 CHIROPRACT MANJ 3-4 REGIONS: CPT | Mod: AT | Performed by: CHIROPRACTOR

## 2024-03-28 PROCEDURE — 97810 ACUP 1/> WO ESTIM 1ST 15 MIN: CPT | Performed by: CHIROPRACTOR

## 2024-03-28 NOTE — PROGRESS NOTES
Right lower back is constantly burning with pain rated 5/10 W24 6/10. Uses Tylenol, stretches, ultrasound, and heat to improve ROM.   Cale Coppola on 3/28/2024 at 3:09 PM     Reviewed by EW    Visit #:  5/12    Subjective:  Adin Madrid is a 64 year old male who is seen in f/u up for:        Segmental and somatic dysfunction of sacral region  Segmental and somatic dysfunction of lumbar region  Right low back pain, unspecified chronicity, unspecified whether sciatica present  Segmental and somatic dysfunction of thoracic region  Segmental and somatic dysfunction of cervical region  Degeneration of cervical intervertebral disc.     Since last visit on 3/25/2024,  Adin Madrid reports: Symptoms are continuing to show signs of improvement.  Patient presents for continuation of treatment.  Home therapy as listed above continue to be beneficial.  No apparent exacerbations since our last encounter.    (DVPRS) Pain Rating Score : 5-Interrupts some activities (W24 6/10) (03/28/24 1506)     Objective:  The following was observed:  Pulse 74   Temp 97.7  F (36.5  C) (Tympanic)   Resp 16   SpO2 95%      P: palpatory tenderness T4, L5/S1 greater on right:    A: static palpation demonstrates intersegmental asymmetry , cervical, thoracic, lumbar, pelvis  R: motion palpation notes restricted motion, C2 , T4 , L5 , and Sacrum   T: muscle spasm at level(s):  Mild lumbar paraspinals and quadratus lumborum bilaterally greater on right, mild right rhomboids, hypertonicity suboccipitals bilaterally:      Segmental spinal dysfunction/restrictions found at:  :  C2 Right lateral flexion restricted and Extension restriction  T4 Right lateral flexion restricted and Extension restriction  L5 Right rotation restricted, Left lateral flexion restricted, and Extension restriction  Sacrum Right lateral flexion restricted and Extension restriction.      Assessment: Patient continuing to show excellent response to treatment.  At this point we  will plan to follow-up with patient in 1 week if symptoms continue.    Diagnoses:      1. Segmental and somatic dysfunction of sacral region    2. Segmental and somatic dysfunction of lumbar region    3. Right low back pain, unspecified chronicity, unspecified whether sciatica present    4. Segmental and somatic dysfunction of thoracic region    5. Segmental and somatic dysfunction of cervical region    6. Degeneration of cervical intervertebral disc        Patient's condition:  Patient had restrictions pre-manipulation    Treatment effectiveness:  Post manipulation there is better intersegmental movement and Patient claims to feel looser post manipulation      Procedures:  CMT:  60199 Chiropractic manipulative treatment 3-4 regions performed   Cervical: Diversified, C2, Supine  Thoracic: Diversified, T4, Prone  Lumbar: Diversified, L5, Side posture  Pelvis: Diversified, Sacrum , Side posture    Modalities:  10830: Acupuncture, for 15 minutes:  Points:  Bl 10, 22, 23, 24, 25, 26, 46, 47, 48, GB 28, SI 9  For 15 minutes    Therapeutic procedures:  None    Response to Treatment  Reduction in symptoms as reported by patient    Prognosis: Good    Progress towards Goals: Patient is making progress towards the goal.     Recommendations:    Instructions: monitor symptoms    Follow-up:  Return to care if symptoms persist.

## 2024-04-01 ENCOUNTER — TELEPHONE (OUTPATIENT)
Dept: PEDIATRICS | Facility: OTHER | Age: 64
End: 2024-04-01
Payer: COMMERCIAL

## 2024-04-01 NOTE — TELEPHONE ENCOUNTER
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 Progress has been slow with adjusting and acupuncture. He has been keeping up with his PT home exercise program.    We had a brief talk today about other treatment options to help, I.e following up with you for a medication regiment, discussing injections such as trigger point or spinal, also seeing Esperanza Hoang or Carlita Irene on staff for more manual physical therapy.    Wanted to see your thoughts on these options. Thanks!    Yony

## 2024-04-01 NOTE — TELEPHONE ENCOUNTER
If he desires my opinion, he should schedule an OV to discuss.    Signed, Adin Ruano MD, FAAP, FACP  Internal Medicine & Pediatrics

## 2024-04-02 ENCOUNTER — THERAPY VISIT (OUTPATIENT)
Dept: CHIROPRACTIC MEDICINE | Facility: OTHER | Age: 64
End: 2024-04-02
Attending: CHIROPRACTOR
Payer: OTHER MISCELLANEOUS

## 2024-04-02 VITALS — RESPIRATION RATE: 16 BRPM | OXYGEN SATURATION: 95 % | HEART RATE: 66 BPM | TEMPERATURE: 97.7 F

## 2024-04-02 DIAGNOSIS — M99.02 SEGMENTAL AND SOMATIC DYSFUNCTION OF THORACIC REGION: ICD-10-CM

## 2024-04-02 DIAGNOSIS — M99.04 SEGMENTAL AND SOMATIC DYSFUNCTION OF SACRAL REGION: Primary | ICD-10-CM

## 2024-04-02 DIAGNOSIS — M54.50 RIGHT LOW BACK PAIN, UNSPECIFIED CHRONICITY, UNSPECIFIED WHETHER SCIATICA PRESENT: ICD-10-CM

## 2024-04-02 DIAGNOSIS — M99.03 SEGMENTAL AND SOMATIC DYSFUNCTION OF LUMBAR REGION: ICD-10-CM

## 2024-04-02 DIAGNOSIS — M50.30 DEGENERATION OF CERVICAL INTERVERTEBRAL DISC: ICD-10-CM

## 2024-04-02 DIAGNOSIS — M99.01 SEGMENTAL AND SOMATIC DYSFUNCTION OF CERVICAL REGION: ICD-10-CM

## 2024-04-02 PROCEDURE — 97810 ACUP 1/> WO ESTIM 1ST 15 MIN: CPT | Performed by: CHIROPRACTOR

## 2024-04-02 PROCEDURE — 98941 CHIROPRACT MANJ 3-4 REGIONS: CPT | Mod: AT | Performed by: CHIROPRACTOR

## 2024-04-02 NOTE — PROGRESS NOTES
Right lower back is frequently sore and catches. 5/10 W24 6/10. Using heat, ice, U/S, and stretches which are providing a decrease in pain.   Юлия Van on 4/2/2024 at 2:59 PM    Reviewed by EW    Visit #:  6/12    Subjective:  Adin Madrid is a 64 year old male who is seen in f/u up for:        Segmental and somatic dysfunction of sacral region  Segmental and somatic dysfunction of lumbar region  Right low back pain, unspecified chronicity, unspecified whether sciatica present  Segmental and somatic dysfunction of thoracic region  Segmental and somatic dysfunction of cervical region  Degeneration of cervical intervertebral disc.     Since last visit on 3/28/2024,  Adin aMdrid reports: Symptoms continuing to show improvement.  Home treatments listed above seem to be more effective.  Patient very pleased with progress at this time.    (DVPRS) Pain Rating Score : 5-Interrupts some activities (W24 6/10) (04/02/24 1454)     Objective:  The following was observed:  Pulse 66   Temp 97.7  F (36.5  C) (Tympanic)   Resp 16   SpO2 95%      P: palpatory tenderness T4, L5/S1 greater on right:    A: static palpation demonstrates intersegmental asymmetry , cervical, thoracic, lumbar, pelvis  R: motion palpation notes restricted motion, C2 , T4 , L5 , and Sacrum   T: muscle spasm at level(s):  Mild lumbar paraspinals and quadratus lumborum bilaterally greater on right, mild right rhomboids, hypertonicity suboccipitals bilaterally:       Segmental spinal dysfunction/restrictions found at:  :  C2 Right lateral flexion restricted and Extension restriction  T4 Right lateral flexion restricted and Extension restriction  L5 Right rotation restricted, Left lateral flexion restricted, and Extension restriction  Sacrum Right lateral flexion restricted and Extension restriction.      Assessment: Patient continues to show excellent signs of progress.  Begin tapering patient care down to once a week barring acute exacerbation of  symptoms.    Diagnoses:      1. Segmental and somatic dysfunction of sacral region    2. Segmental and somatic dysfunction of lumbar region    3. Right low back pain, unspecified chronicity, unspecified whether sciatica present    4. Segmental and somatic dysfunction of thoracic region    5. Segmental and somatic dysfunction of cervical region    6. Degeneration of cervical intervertebral disc        Patient's condition:  Patient had restrictions pre-manipulation    Treatment effectiveness:  Post manipulation there is better intersegmental movement and Patient claims to feel looser post manipulation      Procedures:  CMT:  48778 Chiropractic manipulative treatment 3-4 regions performed   Cervical: Diversified, C2, Supine  Thoracic: Diversified, T4, Prone  Lumbar: Diversified, L5, Side posture  Pelvis: Diversified, Sacrum , Side posture     Modalities:  18634: Acupuncture, for 15 minutes:  Points:  Bl 10, 22, 23, 24, 25, 26, 46, 47, 48, GB 28, SI 9  For 15 minutes     Therapeutic procedures:  None     Response to Treatment  Reduction in symptoms as reported by patient     Prognosis: Good     Progress towards Goals: Patient is making progress towards the goal.            Recommendations:     Instructions: monitor symptoms    Follow-up:  Return to care in 1 week.

## 2024-04-08 ENCOUNTER — THERAPY VISIT (OUTPATIENT)
Dept: CHIROPRACTIC MEDICINE | Facility: OTHER | Age: 64
End: 2024-04-08
Attending: CHIROPRACTOR
Payer: OTHER MISCELLANEOUS

## 2024-04-08 VITALS — RESPIRATION RATE: 16 BRPM | TEMPERATURE: 97.8 F | HEART RATE: 55 BPM

## 2024-04-08 DIAGNOSIS — M99.02 SEGMENTAL AND SOMATIC DYSFUNCTION OF THORACIC REGION: ICD-10-CM

## 2024-04-08 DIAGNOSIS — M99.03 SEGMENTAL AND SOMATIC DYSFUNCTION OF LUMBAR REGION: ICD-10-CM

## 2024-04-08 DIAGNOSIS — M50.30 DEGENERATION OF CERVICAL INTERVERTEBRAL DISC: ICD-10-CM

## 2024-04-08 DIAGNOSIS — M99.01 SEGMENTAL AND SOMATIC DYSFUNCTION OF CERVICAL REGION: ICD-10-CM

## 2024-04-08 DIAGNOSIS — M54.50 RIGHT LOW BACK PAIN, UNSPECIFIED CHRONICITY, UNSPECIFIED WHETHER SCIATICA PRESENT: ICD-10-CM

## 2024-04-08 DIAGNOSIS — M99.04 SEGMENTAL AND SOMATIC DYSFUNCTION OF SACRAL REGION: Primary | ICD-10-CM

## 2024-04-08 PROCEDURE — 98941 CHIROPRACT MANJ 3-4 REGIONS: CPT | Mod: AT | Performed by: CHIROPRACTOR

## 2024-04-08 PROCEDURE — 97810 ACUP 1/> WO ESTIM 1ST 15 MIN: CPT | Performed by: CHIROPRACTOR

## 2024-04-08 NOTE — PROGRESS NOTES
Right lower back is constantly locking up with pain rated 6/10 W24 8/10. Uses hot packs, ultrasound, and stretching to help provide temporary pain relief as well as increase ROM.   Cale Coppola on 4/8/2024 at 3:26 PM     Reviewed by EW    Visit #:  7/12    Subjective:  Adin Madrid is a 64 year old male who is seen in f/u up for:        Segmental and somatic dysfunction of sacral region  Segmental and somatic dysfunction of lumbar region  Right low back pain, unspecified chronicity, unspecified whether sciatica present  Segmental and somatic dysfunction of thoracic region  Segmental and somatic dysfunction of cervical region  Degeneration of cervical intervertebral disc.     Since last visit on 4/2/2024,  Adin Madrid reports: Symptoms did well initially after our last encounter.  Aggravation of symptoms noted over the weekend.  No specific causes were noted by patient such as overuse or traumatic events.  No radiculopathy at this time.  Patient is having a catching sensation especially with bending motions.    (DVPRS) Pain Rating Score : 6-Hard to ignore, avoid usual activities (W24 8/10) (04/08/24 1522)     Objective:  The following was observed:  Pulse 55   Temp 97.8  F (36.6  C) (Tympanic)   Resp 16      P: palpatory tenderness C2 left, T4 on right, L5/S1:    A: static palpation demonstrates intersegmental asymmetry , cervical, thoracic, lumbar, pelvis  R: motion palpation notes restricted motion, C2 , T4 , L5 , and Sacrum   T: muscle spasm at level(s):  Lumbar paraspinals bilaterally, right quadratus lumborum, mild splenius capitis on left, right rhomboids:   mild edema noted right SI joint    Segmental spinal dysfunction/restrictions found at:  :  C2 Left lateral flexion restricted and Extension restriction  T4 Right lateral flexion restricted and Extension restriction  L5 Left rotation restricted, Right lateral flexion restricted, and Extension restriction  Sacrum Left lateral flexion restricted and  Extension restriction.      Assessment: Exacerbation of symptoms.  Patient has exhibited similar progression of symptoms with prior episodes of care.  Believe this to be a temporary setback and overall progression of symptoms.  Suspect possible disc irritation.  Because of this we did trial prone lumbar arching.  This seems to be tolerable.  Encouraged gentle prone cobra pose as well as child's pose stretching to help with symptoms.  At this time we will plan to follow-up with patient in about a week.  However if symptoms fail to improve or exacerbate further this week plan to follow-up with patient again this week.    Diagnoses:      1. Segmental and somatic dysfunction of sacral region    2. Segmental and somatic dysfunction of lumbar region    3. Right low back pain, unspecified chronicity, unspecified whether sciatica present    4. Segmental and somatic dysfunction of thoracic region    5. Segmental and somatic dysfunction of cervical region    6. Degeneration of cervical intervertebral disc        Patient's condition:  Patient had restrictions pre-manipulation    Treatment effectiveness:  Post manipulation there is better intersegmental movement and Patient claims to feel looser post manipulation      Procedures:  CMT:  04621 Chiropractic manipulative treatment 3-4 regions performed   Cervical: Diversified, C2, Supine  Thoracic: Diversified, T4, Prone  Lumbar: Diversified, L5, Side posture  Pelvis: Diversified, Sacrum , Side posture    Modalities:  18110: Acupuncture, for 15 minutes:  Points: Bl 10, 22, 23, 24, 25, 26, 46, 47, 48, GB 28, SI 9     Therapeutic procedures:  Child's pose stretching, Cobra pose/backbend exercise prone    Response to Treatment  Reduction in symptoms as reported by patient    Prognosis: Good    Progress towards Goals: Patient is making progress towards the goal.     Recommendations:    Instructions: Stretch as instructed and monitor symptoms closely.    Follow-up:  Return to care in 1  week, sooner if symptoms fail to improve or exacerbate..

## 2024-04-19 ENCOUNTER — THERAPY VISIT (OUTPATIENT)
Dept: CHIROPRACTIC MEDICINE | Facility: OTHER | Age: 64
End: 2024-04-19
Attending: CHIROPRACTOR
Payer: OTHER MISCELLANEOUS

## 2024-04-19 VITALS — TEMPERATURE: 96.6 F | HEART RATE: 68 BPM | OXYGEN SATURATION: 98 % | RESPIRATION RATE: 16 BRPM

## 2024-04-19 DIAGNOSIS — M99.03 SEGMENTAL AND SOMATIC DYSFUNCTION OF LUMBAR REGION: ICD-10-CM

## 2024-04-19 DIAGNOSIS — M99.04 SEGMENTAL AND SOMATIC DYSFUNCTION OF SACRAL REGION: Primary | ICD-10-CM

## 2024-04-19 DIAGNOSIS — M54.50 RIGHT LOW BACK PAIN, UNSPECIFIED CHRONICITY, UNSPECIFIED WHETHER SCIATICA PRESENT: ICD-10-CM

## 2024-04-19 DIAGNOSIS — M99.02 SEGMENTAL AND SOMATIC DYSFUNCTION OF THORACIC REGION: ICD-10-CM

## 2024-04-19 PROCEDURE — 98940 CHIROPRACT MANJ 1-2 REGIONS: CPT | Mod: AT | Performed by: CHIROPRACTOR

## 2024-04-19 NOTE — PROGRESS NOTES
Right lower back with a constant sore and stiffness. 5/10 W24 9/10. Using heat pack and ultrasound machine.   Nadia Fischer on 4/19/2024 at 11:01 AM    Reviewed by CC.    Visit #:  8/12    Subjective:  Adin Madrid is a 64 year old male who is seen in f/u up for:        Segmental and somatic dysfunction of sacral region  Segmental and somatic dysfunction of lumbar region  Right low back pain, unspecified chronicity, unspecified whether sciatica present  Segmental and somatic dysfunction of thoracic region.     Since last visit on 4/8/2024,  Adin Madrid reports: That pain continues to come and go.  Patient states that initially he had reduction in symptoms following last treatment but he is very busy at work.  He reports no work restrictions at this time.  Yesterday he was really sore due to increased activity at work.  He reports some swelling on the right and states that hot pack/ultrasound at home continues to help some.  With some hot packs, ultrasound at home helps, swelling on right.  Dr. Nath has been treating provider on this case and I am filling in in his absence.    (DVPRS) Pain Rating Score : 5-Interrupts some activities (W24 9/10) (04/19/24 1056)     Objective:  The following was observed:  Pulse 68   Temp (!) 96.6  F (35.9  C) (Tympanic)   Resp 16   SpO2 98%      P: palpatory tenderness lumbosacral joint right worse than left, T4-T5   A: static palpation demonstrates intersegmental asymmetry , thoracic, lumbar, pelvis  R: motion palpation notes restricted motion  T: hypertonicity at: Lumbar erector spine, Quad lumb, and T-spine paraspinal Bilaterally    Segmental spinal dysfunction/restrictions found at:  :  T4 Extension restriction  L5 Left rotation restricted, Right lateral flexion restricted, and Extension restriction  Sacrum Left lateral flexion restricted and Extension restriction.      Assessment: Symptoms continue to ebb and flow.  Recent increase due to elevated activity at work.  Could  consider work restrictions if possible.  Will recommend patient follow-up with Dr. Nath next week.    Diagnoses:      1. Segmental and somatic dysfunction of sacral region    2. Segmental and somatic dysfunction of lumbar region    3. Right low back pain, unspecified chronicity, unspecified whether sciatica present    4. Segmental and somatic dysfunction of thoracic region        Patient's condition:  Patient had restrictions pre-manipulation    Treatment effectiveness:  Post manipulation there is better intersegmental movement and Patient claims to feel looser post manipulation      Procedures:  CMT:  75277 Chiropractic manipulative treatment 1-2 regions performed   Thoracic: Diversified, T4, Prone  Lumbar: Diversified, L5, Side posture  Pelvis: Diversified, Sacrum , Side posture    Modalities:  None performed this visit    Therapeutic procedures:  None    Response to Treatment  Reduction in symptoms as reported by patient    Prognosis: Good    Progress towards Goals: Patient is making progress towards the goal.     Recommendations:    Instructions: Continue stretching and exercising as previously shown, monitor for symptoms    Follow-up: Return next week with Dr. Nath, sooner if needed

## 2024-04-23 ENCOUNTER — THERAPY VISIT (OUTPATIENT)
Dept: CHIROPRACTIC MEDICINE | Facility: OTHER | Age: 64
End: 2024-04-23
Attending: CHIROPRACTOR
Payer: OTHER MISCELLANEOUS

## 2024-04-23 VITALS — OXYGEN SATURATION: 98 % | TEMPERATURE: 96.5 F | HEART RATE: 59 BPM | RESPIRATION RATE: 16 BRPM

## 2024-04-23 DIAGNOSIS — M99.02 SEGMENTAL AND SOMATIC DYSFUNCTION OF THORACIC REGION: ICD-10-CM

## 2024-04-23 DIAGNOSIS — M99.04 SEGMENTAL AND SOMATIC DYSFUNCTION OF SACRAL REGION: Primary | ICD-10-CM

## 2024-04-23 DIAGNOSIS — M54.50 BILATERAL LOW BACK PAIN, UNSPECIFIED CHRONICITY, UNSPECIFIED WHETHER SCIATICA PRESENT: ICD-10-CM

## 2024-04-23 DIAGNOSIS — M99.05 SEGMENTAL AND SOMATIC DYSFUNCTION OF PELVIC REGION: ICD-10-CM

## 2024-04-23 PROCEDURE — 97810 ACUP 1/> WO ESTIM 1ST 15 MIN: CPT | Performed by: CHIROPRACTOR

## 2024-04-23 PROCEDURE — 98940 CHIROPRACT MANJ 1-2 REGIONS: CPT | Mod: AT | Performed by: CHIROPRACTOR

## 2024-04-23 NOTE — PROGRESS NOTES
Bilateral lower back is constantly aching, with frequent lock up feeling  6/10 W24 8/10. Using ice and U/S which is helping to reduce swelling and provide a decrease in pain.   Юлия Van on 4/23/2024 at 3:08 PM    Reviewed by EW    Visit #:  9/12    Subjective:  Adin Madrid is a 64 year old male who is seen in f/u up for:        Segmental and somatic dysfunction of sacral region  Segmental and somatic dysfunction of pelvic region  Segmental and somatic dysfunction of thoracic region  Bilateral low back pain, unspecified chronicity, unspecified whether sciatica present.     Since last visit on 4/19/2024,  Adin Madrid reports: After most recent appointment with  Symptoms improved for approximately 1-2 days before returning.  Patient has switched from using heat to cold.  While cold is uncomfortable patient does feel that this is an more beneficial with his symptoms.  Continuing to do home exercises from physical therapy which provides less than an hour of relief from symptoms.  Patient states that he is able to do activities although he does feel that he is still limited in its capacity to be able to perform some duties.  Finds that hip hinging, transitional motions painful/difficult.  Feels that pain is lower in the back today.  Notes more stress at work as of recently.    (DVPRS) Pain Rating Score : 6-Hard to ignore, avoid usual activities (W24 8/10) (04/23/24 1504)     Objective:  The following was observed:  Pulse 59   Temp (!) 96.5  F (35.8  C) (Tympanic)   Resp 16   SpO2 98%      P: palpatory tenderness PSIS bilaterally:    A: static palpation demonstrates intersegmental asymmetry , thoracic, pelvis  R: motion palpation notes restricted motion, T4 , Sacrum , PSIS Left , and PSIS Right   T: No palpable spasms, hypertonicity lumbar paraspinals, quadratus lumborum bilaterally.  No palpable edema noted around the PSIS    Segmental spinal dysfunction/restrictions found at:  :  Sacrum Left lateral flexion  restricted and Extension restriction  PSIS Left Flexion restriction  PSIS Right Extension restriction.  T4 extension restriction    Assessment: Segmental/somatic dysfunction continuing primarily of the SI joints and thoracic spine.    Assessment the cervical spine not suggestive of segmental/somatic dysfunction.  Patient still reports some pain and discomfort here.  Objectively patient is showing good signs of improvement.  Subjectively pain continues to ebb and flow.  Patient did note increased levels of stress lately which could be a major contributing factor for pain.  Prior imaging studies from roughly a year and a half ago show degenerative changes throughout the lumbar spine.  Consider reimaging patient if symptoms fail to improve or continue.  It is possible although unlikely that compression injury of the spine occurred.    Diagnoses:      1. Segmental and somatic dysfunction of sacral region    2. Segmental and somatic dysfunction of pelvic region    3. Segmental and somatic dysfunction of thoracic region    4. Bilateral low back pain, unspecified chronicity, unspecified whether sciatica present        Patient's condition:  Patient had restrictions pre-manipulation    Treatment effectiveness:  Post manipulation there is better intersegmental movement and Patient claims to feel looser post manipulation      Procedures:  CMT:  69519 Chiropractic manipulative treatment 1-2 regions performed   Thoracic: Diversified, T4, Prone  Pelvis: Diversified, Sacrum , PSIS Right , Side posture    Modalities:  98809: Acupuncture, for 15 minutes:  Points:  Bl 10, 22, 23, 24, 25, 26, 46, 47  For 15 minutes    Therapeutic procedures:  None    Response to Treatment  Reduction in symptoms as reported by patient    Prognosis: Good    Progress towards Goals: In progress     Recommendations:    Instructions: Monitor symptoms and perform activities as tolerated    Follow-up:  Return to care in within 1 week.

## 2024-05-04 ENCOUNTER — HEALTH MAINTENANCE LETTER (OUTPATIENT)
Age: 64
End: 2024-05-04

## 2024-05-08 ENCOUNTER — THERAPY VISIT (OUTPATIENT)
Dept: CHIROPRACTIC MEDICINE | Facility: OTHER | Age: 64
End: 2024-05-08
Attending: CHIROPRACTOR
Payer: OTHER MISCELLANEOUS

## 2024-05-08 ENCOUNTER — HOSPITAL ENCOUNTER (OUTPATIENT)
Dept: GENERAL RADIOLOGY | Facility: OTHER | Age: 64
Discharge: HOME | End: 2024-05-08
Attending: CHIROPRACTOR
Payer: OTHER MISCELLANEOUS

## 2024-05-08 VITALS — OXYGEN SATURATION: 94 % | RESPIRATION RATE: 16 BRPM | TEMPERATURE: 96.5 F | HEART RATE: 57 BPM

## 2024-05-08 DIAGNOSIS — M99.04 SEGMENTAL AND SOMATIC DYSFUNCTION OF SACRAL REGION: ICD-10-CM

## 2024-05-08 DIAGNOSIS — M54.50 BILATERAL LOW BACK PAIN, UNSPECIFIED CHRONICITY, UNSPECIFIED WHETHER SCIATICA PRESENT: Primary | ICD-10-CM

## 2024-05-08 DIAGNOSIS — M47.817 FACET ARTHRITIS OF LUMBOSACRAL REGION: ICD-10-CM

## 2024-05-08 DIAGNOSIS — M99.02 SEGMENTAL AND SOMATIC DYSFUNCTION OF THORACIC REGION: ICD-10-CM

## 2024-05-08 PROCEDURE — 98940 CHIROPRACT MANJ 1-2 REGIONS: CPT | Mod: AT | Performed by: CHIROPRACTOR

## 2024-05-08 PROCEDURE — 97810 ACUP 1/> WO ESTIM 1ST 15 MIN: CPT | Performed by: CHIROPRACTOR

## 2024-05-08 PROCEDURE — 72100 X-RAY EXAM L-S SPINE 2/3 VWS: CPT

## 2024-05-08 NOTE — PROGRESS NOTES
Bilateral lower back is constantly aching. 5/10 W24 7/10. Using ice and stretches which provide a decrease in pain.  Юлия Van on 5/8/2024 at 9:00 AM    Reviewed by EW    Visit #:  10/12    Subjective:  Adin Madrid is a 64 year old male who is seen in f/u up for:        Bilateral low back pain, unspecified chronicity, unspecified whether sciatica present  Segmental and somatic dysfunction of sacral region  Segmental and somatic dysfunction of thoracic region  Facet arthritis of lumbosacral region.     Since last visit on 4/23/2024,  Adin Madrid reports: Currently in a very busy season for work which makes follow-up care difficult to anticipate plan for.  Patient states that he is taking time off of work to be here today.    Overall back pain seems to be doing better.  Continues to ebb and flow.  Finds that when he stands for extended periods finding something to support his body weight with helps reduce back pain.  Patient has also been utilizing more ice which seems to be helping symptoms more, decreasing amount of ultrasound therapy that he does at home as well.  Continues to perform home exercises from physical therapy.    (DVPRS) Pain Rating Score : 5-Interrupts some activities (W24 7/10) (05/08/24 0857)     Objective:  The following was observed:  Pulse 57   Temp (!) 96.5  F (35.8  C) (Tympanic)   Resp 16   SpO2 94%    Oswestry (JOSELYN) Questionnaire        5/8/2024     9:01 AM   OSWESTRY DISABILITY INDEX   Count 9   Sum 15   Oswestry Score (%) 33.33 %       Improved from 48.89% when last assessed      Study Result    Narrative & Impression   PROCEDURE: XR LUMBAR SPINE 2-3 VIEWS 3/1/2022 3:43 PM     HISTORY: low back pain; Chronic low back pain, unspecified back pain  laterality, unspecified whether sciatica present; Chronic low back  pain, unspecified back pain laterality, unspecified whether sciatica  present     COMPARISONS: None.     TECHNIQUE: AP, lateral and coned-down     FINDINGS: AP views  unremarkable.     Lateral view: Mild-to-moderate disease at L3-4, L4-5 with mild disc  disease at the remaining levels. Moderate facet arthrosis at L4-5 and  L5-S1 and the coned-down view demonstrated moderate narrowing of the  neural foramina at L5-S1 and mild to moderate narrowing at L4-5.     No compression deformities.                                                                        IMPRESSION:   1. Mild-to-moderate disc disease at L3-4 and L4-5 with diffuse mild  disease.  2. Progressive facet arthrosis with moderate facet arthrosis at the  lower 2 levels.  3. Moderate narrowing of the neural foramen at L5-S1.  4. No compression deformities.     ALINE PATTERSON MD         SYSTEM ID:  [id number]       P: palpatory tenderness not reported by patient:    A: static palpation demonstrates intersegmental asymmetry , thoracic, pelvis  R: motion palpation notes restricted motion, T4 , T12 , and Sacrum   T: Hypertonicity lumbar paraspinals and quadratus lumborum bilaterally, cervical paraspinals bilaterally    Segmental spinal dysfunction/restrictions found at:  :  T4 Extension restriction  T12 Right lateral flexion restricted and Extension restriction  Sacrum Left lateral flexion restricted and Extension restriction.      Assessment: Patient is showing signs of progress towards subjectively and objectively.  Due to slower progress and past history of advanced degenerative changes of the lumbopelvic spine x-rays are being ordered.  These will be reviewed by myself and our radiologist and patient will be contacted with results.    Patient is beginning a very busy season with work which will make it very difficult for us to plan follow-up care.  Discussed physical therapy as another treatment option.  Also discussed possibility of following up with primary care provider if possible.    Diagnoses:      1. Bilateral low back pain, unspecified chronicity, unspecified whether sciatica present    2. Segmental and  somatic dysfunction of sacral region    3. Segmental and somatic dysfunction of thoracic region    4. Facet arthritis of lumbosacral region        Patient's condition:  Patient had restrictions pre-manipulation    Treatment effectiveness:  Post manipulation there is better intersegmental movement      Procedures:  CMT:  50270 Chiropractic manipulative treatment 1-2 regions performed   Thoracic: Diversified, T4, T12, Prone  Pelvis: Diversified, Sacrum , Side posture    Modalities:  89177: Acupuncture, for 15 minutes:  Points:  Bl 22, 23, 24, 25, 46, 47, 48, GB 28  For 15 minutes    Therapeutic procedures:  None    Response to Treatment  Tolerated treatment    Prognosis: Good    Progress towards Goals: Patient is making progress towards the goal.     Recommendations:    Instructions: monitor symptoms    Follow-up:  return to care when able to break free from work.

## 2024-05-16 LAB — NONINV COLON CA DNA+OCC BLD SCRN STL QL: NEGATIVE

## 2024-05-23 ENCOUNTER — THERAPY VISIT (OUTPATIENT)
Dept: CHIROPRACTIC MEDICINE | Facility: OTHER | Age: 64
End: 2024-05-23
Attending: INTERNAL MEDICINE
Payer: COMMERCIAL

## 2024-05-23 VITALS — OXYGEN SATURATION: 95 % | HEART RATE: 62 BPM | RESPIRATION RATE: 16 BRPM | TEMPERATURE: 96.4 F

## 2024-05-23 DIAGNOSIS — M99.01 SEGMENTAL AND SOMATIC DYSFUNCTION OF CERVICAL REGION: ICD-10-CM

## 2024-05-23 DIAGNOSIS — M50.30 DEGENERATION OF CERVICAL INTERVERTEBRAL DISC: ICD-10-CM

## 2024-05-23 DIAGNOSIS — M99.02 SEGMENTAL AND SOMATIC DYSFUNCTION OF THORACIC REGION: Primary | ICD-10-CM

## 2024-05-23 DIAGNOSIS — M94.0 SLIPPED RIB SYNDROME: ICD-10-CM

## 2024-05-23 PROCEDURE — 99212 OFFICE O/P EST SF 10 MIN: CPT | Mod: 25 | Performed by: CHIROPRACTOR

## 2024-05-23 PROCEDURE — 98940 CHIROPRACT MANJ 1-2 REGIONS: CPT | Mod: AT | Performed by: CHIROPRACTOR

## 2024-05-23 NOTE — PROGRESS NOTES
Flared about 5 days. Medial upper back is constantly aching. 7/10 W24 10/10. Using ice, heat, and stretches which are providing temporary relief.  Юлия Van on 5/23/2024 at 10:09 AM    Reviewed by EW    Visit #:  1   New Episode of care    Subjective:  Adin Madrid is a 64 year old male who is seen in f/u up for:        Segmental and somatic dysfunction of thoracic region  Segmental and somatic dysfunction of cervical region  Slipped rib syndrome  Degeneration of cervical intervertebral disc.     Since last visit on 5/8/2024,  Adin Madrid reports: About 4-5 days ago patient began noticing pain in his mid back.  Denied any traumatic events contributing to symptoms.  Denies any radiation of symptoms into the upper extremities, abdomen or chest.  Finds pain with deep breathing.    Patient had been working with our office previously for work comp related issue, that is not what the patient is being seen for at this time.    (DVPRS) Pain Rating Score : 7-Focus of attention, prevents doing daily activities (W24 10/10) (05/23/24 1005)     Objective:  The following was observed:  Pulse 62   Temp (!) 96.4  F (35.8  C) (Tympanic)   Resp 16   SpO2 95%    Oswestry (JOSELYN) Questionnaire        5/23/2024    10:09 AM   OSWESTRY DISABILITY INDEX   Count 9   Sum 18   Oswestry Score (%) 40 %       Thoracic AROM: severe limits with rotation and flexion. Pain noted with AROM    Kemps: positive in thoracic spine with extension    P: palpatory tenderness C2 right, C6 left, T3 left, T8 left, T12 left:    A: static palpation demonstrates intersegmental asymmetry , cervical, thoracic  R: motion palpation notes restricted motion, C2 , C6 , T3 , T8 , and T12   T: muscle spasm at level(s):  left rhomboids:  intercostals on left side T3-T9    Segmental spinal dysfunction/restrictions found at:  :  C2 Right lateral flexion restricted and Extension restriction  C6 Left lateral flexion restricted and Extension restriction  T3 Left lateral  flexion restricted and Extension restriction  T8 Left lateral flexion restricted and Extension restriction  T12 Left lateral flexion restricted and Extension restriction.      Assessment: Segmental/somatic dysfunction apparent of the cervical, thoracic regions.  Aberrant motion also noted of the posterior costal vertebral articulation on the left side at T3 and T8.  Chiropractic care does seem appropriate given today's exam findings and presentation of the patient.  Patient is working a very hectic job schedule at this time which does make follow-up care difficult to anticipate.  Follow-up with patient if needed.  Inform patient that he will likely be sore for up to 48 hours but that this should reduce.  Encourage patient to contact our office for any follow-up care needed.    Diagnoses:      1. Segmental and somatic dysfunction of thoracic region    2. Segmental and somatic dysfunction of cervical region    3. Slipped rib syndrome    4. Degeneration of cervical intervertebral disc        Patient's condition:  Patient had restrictions pre-manipulation    Treatment effectiveness:  Post manipulation there is better intersegmental movement      Procedures:  E/M 08710    CMT:  32231 Chiropractic manipulative treatment 1-2 regions performed   Cervical: gentle diversified, C2, C6, Supine  Thoracic: Diversified, T3, T8, T12, Prone    Modalities:  70216: MSTM:  To  left rhomboids:   for 5 min    Therapeutic procedures:  None    Response to Treatment  Reduction in symptoms as reported by patient    Prognosis: Good    Progress towards Goals: Reduce pain by up to 50% within 4-6 weeks  Improve pain free thoracic rotation  Reduce oswestry score by up to 20% within 4-6 weeks     Recommendations:    Instructions:ice 20 minutes every other hour as needed and rest    Follow-up:  Return to care if symptoms persist.

## 2024-05-29 ENCOUNTER — THERAPY VISIT (OUTPATIENT)
Dept: CHIROPRACTIC MEDICINE | Facility: OTHER | Age: 64
End: 2024-05-29
Attending: CHIROPRACTOR
Payer: COMMERCIAL

## 2024-05-29 VITALS — HEART RATE: 60 BPM | TEMPERATURE: 96.7 F | OXYGEN SATURATION: 97 % | RESPIRATION RATE: 18 BRPM

## 2024-05-29 DIAGNOSIS — M99.02 SEGMENTAL AND SOMATIC DYSFUNCTION OF THORACIC REGION: Primary | ICD-10-CM

## 2024-05-29 DIAGNOSIS — M99.01 SEGMENTAL AND SOMATIC DYSFUNCTION OF CERVICAL REGION: ICD-10-CM

## 2024-05-29 DIAGNOSIS — M50.30 DEGENERATION OF CERVICAL INTERVERTEBRAL DISC: ICD-10-CM

## 2024-05-29 DIAGNOSIS — M99.04 SEGMENTAL AND SOMATIC DYSFUNCTION OF SACRAL REGION: ICD-10-CM

## 2024-05-29 DIAGNOSIS — M47.817 FACET ARTHRITIS OF LUMBOSACRAL REGION: ICD-10-CM

## 2024-05-29 DIAGNOSIS — M94.0 SLIPPED RIB SYNDROME: ICD-10-CM

## 2024-05-29 PROCEDURE — 98940 CHIROPRACT MANJ 1-2 REGIONS: CPT | Mod: AT | Performed by: CHIROPRACTOR

## 2024-05-29 PROCEDURE — 97810 ACUP 1/> WO ESTIM 1ST 15 MIN: CPT | Performed by: CHIROPRACTOR

## 2024-05-29 NOTE — PROGRESS NOTES
Medial mid/upper back is constantly dull and sore. 7/10 W24 9/10. Using ice, heat, and taking Tylenol which are providing temporary relief.  Юлия Van on 5/29/2024 at 10:26 AM    Reviewed by EW    Visit #:  2    Subjective:  Adin Madrid is a 64 year old male who is seen in f/u up for:        Segmental and somatic dysfunction of thoracic region  Slipped rib syndrome  Segmental and somatic dysfunction of cervical region  Degeneration of cervical intervertebral disc  Segmental and somatic dysfunction of sacral region  Facet arthritis of lumbosacral region.     Since last visit on 5/23/2024,  Adin Madrid reports: After last encounter symptoms were aggravated for about 2 days.  Notes that upper back concerns are improving at this time although have not fully resolved patient presents for follow-up care.  Ice, heat, home care measures do seem to be providing some level of benefit.    Patient does feel some pain in his neck as well as his low back.        (DVPRS) Pain Rating Score : 7-Focus of attention, prevents doing daily activities (W24 9/10) (05/29/24 1023)     Objective:  The following was observed:  Pulse 60   Temp (!) 96.7  F (35.9  C) (Tympanic)   Resp 18   SpO2 97%      P: palpatory tendernessRhomboids and Traps L>>R  A: static palpation demonstrates intersegmental asymmetry , cervical, thoracic, pelvis  R: motion palpation notes restricted motion, C1 , C2 , T4 , T8 , and Sacrum   T: Spasms present rhomboids, trapezius and intercostal musculature on the left, hypertonicity noted suboccipitals bilaterally, quadratus lumborum on left    Segmental spinal dysfunction/restrictions found at:  :  C1 Left rotation restricted and Right lateral flexion restricted  C2 Left lateral flexion restricted and Extension restriction  T4 Left lateral flexion restricted and Extension restriction  T8 Left lateral flexion restricted and Extension restriction  Sacrum Left lateral flexion restricted and Extension  restriction.      Assessment: Symptoms showing some level of improvement.  Anticipate additional visits to be beneficial.  Patient's work schedule is very restrictive which is difficult to anticipate for follow-up care.  We did discuss inclusion of physical therapy as I believe this would be a continued benefit for at only mid back but low back concerns.  Patient has worked with physical therapy for extensive period of time this past winter with good results.  Acupuncture will be included today as this has been beneficial for the patient in the past.    Diagnoses:      1. Segmental and somatic dysfunction of thoracic region    2. Slipped rib syndrome    3. Segmental and somatic dysfunction of cervical region    4. Degeneration of cervical intervertebral disc    5. Segmental and somatic dysfunction of sacral region    6. Facet arthritis of lumbosacral region        Patient's condition:  Patient had restrictions pre-manipulation    Treatment effectiveness:  Post manipulation there is better intersegmental movement and Patient claims to feel looser post manipulation      Procedures:  CMT:  49769 Chiropractic manipulative treatment 1-2 regions performed   Cervical: Mobilization, C1 , C2, Supine  Thoracic: Diversified, T4, T8, Prone  Pelvis: Diversified, Sacrum , Side posture    Modalities:  90480: Acupuncture, for 15 minutes:  Points:  Bl 10, 13 (L), 15 (L), 17, (L), 23, 24, 25, 46, 47, SI 9 (L), 11(L)  For 15 minutes    Therapeutic procedures:  Cat backs with thoracic rotation into extension motions.    Response to Treatment  Reduction in symptoms as reported by patient    Prognosis: Guarded    Progress towards Goals: In progress     Recommendations:    Instructions:ice 20 minutes every other hour as needed, heat 15 minutes every other hour as needed, and stretch as instructed at visit    Follow-up:  Return to care if symptoms persist.

## 2024-06-19 DIAGNOSIS — E11.9 TYPE 2 DIABETES MELLITUS WITHOUT COMPLICATION, WITHOUT LONG-TERM CURRENT USE OF INSULIN (H): ICD-10-CM

## 2024-06-20 RX ORDER — LISINOPRIL 2.5 MG/1
2.5 TABLET ORAL DAILY
Qty: 90 TABLET | Refills: 0 | Status: SHIPPED | OUTPATIENT
Start: 2024-06-20 | End: 2024-07-18

## 2024-06-20 NOTE — TELEPHONE ENCOUNTER
[facility] sent Rx request for the following:      Requested Prescriptions   Pending Prescriptions Disp Refills    lisinopril (ZESTRIL) 2.5 MG tablet [Pharmacy Med Name: LISINOPRIL 2.5MG TABLETS] 90 tablet 3     Sig: TAKE 1 TABLET(2.5 MG) BY MOUTH DAILY       ACE Inhibitors (Including Combos) Protocol Passed - 6/20/2024  4:24 PM     Last Prescription Date:   9/19/23  Last Fill Qty/Refills:         90, R-3    Last Office Visit:              9/19/23   Future Office visit:            none   Estela Canales, RN on 6/20/2024 at 4:24 PM

## 2024-07-15 ASSESSMENT — ASTHMA QUESTIONNAIRES
QUESTION_2 LAST FOUR WEEKS HOW OFTEN HAVE YOU HAD SHORTNESS OF BREATH: NOT AT ALL
QUESTION_1 LAST FOUR WEEKS HOW MUCH OF THE TIME DID YOUR ASTHMA KEEP YOU FROM GETTING AS MUCH DONE AT WORK, SCHOOL OR AT HOME: NONE OF THE TIME
QUESTION_5 LAST FOUR WEEKS HOW WOULD YOU RATE YOUR ASTHMA CONTROL: COMPLETELY CONTROLLED
QUESTION_4 LAST FOUR WEEKS HOW OFTEN HAVE YOU USED YOUR RESCUE INHALER OR NEBULIZER MEDICATION (SUCH AS ALBUTEROL): NOT AT ALL
ACT_TOTALSCORE: 25
QUESTION_3 LAST FOUR WEEKS HOW OFTEN DID YOUR ASTHMA SYMPTOMS (WHEEZING, COUGHING, SHORTNESS OF BREATH, CHEST TIGHTNESS OR PAIN) WAKE YOU UP AT NIGHT OR EARLIER THAN USUAL IN THE MORNING: NOT AT ALL

## 2024-07-15 ASSESSMENT — ANXIETY QUESTIONNAIRES
GAD7 TOTAL SCORE: 15
5. BEING SO RESTLESS THAT IT IS HARD TO SIT STILL: MORE THAN HALF THE DAYS
2. NOT BEING ABLE TO STOP OR CONTROL WORRYING: MORE THAN HALF THE DAYS
1. FEELING NERVOUS, ANXIOUS, OR ON EDGE: NEARLY EVERY DAY
8. IF YOU CHECKED OFF ANY PROBLEMS, HOW DIFFICULT HAVE THESE MADE IT FOR YOU TO DO YOUR WORK, TAKE CARE OF THINGS AT HOME, OR GET ALONG WITH OTHER PEOPLE?: VERY DIFFICULT
7. FEELING AFRAID AS IF SOMETHING AWFUL MIGHT HAPPEN: NOT AT ALL
3. WORRYING TOO MUCH ABOUT DIFFERENT THINGS: MORE THAN HALF THE DAYS
6. BECOMING EASILY ANNOYED OR IRRITABLE: NEARLY EVERY DAY
4. TROUBLE RELAXING: NEARLY EVERY DAY
7. FEELING AFRAID AS IF SOMETHING AWFUL MIGHT HAPPEN: NOT AT ALL
IF YOU CHECKED OFF ANY PROBLEMS ON THIS QUESTIONNAIRE, HOW DIFFICULT HAVE THESE PROBLEMS MADE IT FOR YOU TO DO YOUR WORK, TAKE CARE OF THINGS AT HOME, OR GET ALONG WITH OTHER PEOPLE: VERY DIFFICULT

## 2024-07-16 ASSESSMENT — ASTHMA QUESTIONNAIRES: ACT_TOTALSCORE: 25

## 2024-07-18 ENCOUNTER — OFFICE VISIT (OUTPATIENT)
Dept: PEDIATRICS | Facility: OTHER | Age: 64
End: 2024-07-18
Attending: INTERNAL MEDICINE
Payer: COMMERCIAL

## 2024-07-18 VITALS
OXYGEN SATURATION: 95 % | HEIGHT: 69 IN | WEIGHT: 196.5 LBS | DIASTOLIC BLOOD PRESSURE: 70 MMHG | TEMPERATURE: 98.3 F | HEART RATE: 60 BPM | BODY MASS INDEX: 29.1 KG/M2 | RESPIRATION RATE: 16 BRPM | SYSTOLIC BLOOD PRESSURE: 102 MMHG

## 2024-07-18 DIAGNOSIS — R45.4 IRRITABILITY: ICD-10-CM

## 2024-07-18 DIAGNOSIS — F39 MOOD DISORDER (H): ICD-10-CM

## 2024-07-18 DIAGNOSIS — F41.9 ANXIETY: ICD-10-CM

## 2024-07-18 DIAGNOSIS — M54.2 CHRONIC NECK AND BACK PAIN: ICD-10-CM

## 2024-07-18 DIAGNOSIS — F43.9 STRESS AT HOME: ICD-10-CM

## 2024-07-18 DIAGNOSIS — I48.20 CHRONIC ATRIAL FIBRILLATION (H): ICD-10-CM

## 2024-07-18 DIAGNOSIS — H00.015 HORDEOLUM EXTERNUM OF LEFT LOWER EYELID: ICD-10-CM

## 2024-07-18 DIAGNOSIS — I50.32 CHRONIC HEART FAILURE WITH PRESERVED EJECTION FRACTION (H): ICD-10-CM

## 2024-07-18 DIAGNOSIS — F33.0 MAJOR DEPRESSIVE DISORDER, RECURRENT EPISODE, MILD (H): ICD-10-CM

## 2024-07-18 DIAGNOSIS — G47.33 OSA ON CPAP: ICD-10-CM

## 2024-07-18 DIAGNOSIS — M54.9 CHRONIC NECK AND BACK PAIN: ICD-10-CM

## 2024-07-18 DIAGNOSIS — E11.9 TYPE 2 DIABETES MELLITUS WITHOUT COMPLICATION, WITHOUT LONG-TERM CURRENT USE OF INSULIN (H): ICD-10-CM

## 2024-07-18 DIAGNOSIS — E78.2 MIXED HYPERLIPIDEMIA: ICD-10-CM

## 2024-07-18 DIAGNOSIS — F41.1 GAD (GENERALIZED ANXIETY DISORDER): ICD-10-CM

## 2024-07-18 DIAGNOSIS — Z00.00 ROUTINE GENERAL MEDICAL EXAMINATION AT A HEALTH CARE FACILITY: Primary | ICD-10-CM

## 2024-07-18 DIAGNOSIS — G89.29 CHRONIC NECK AND BACK PAIN: ICD-10-CM

## 2024-07-18 DIAGNOSIS — I48.91 ATRIAL FIBRILLATION STATUS POST CARDIOVERSION (H): ICD-10-CM

## 2024-07-18 PROBLEM — I50.9 CHF (CONGESTIVE HEART FAILURE) (H): Status: ACTIVE | Noted: 2024-07-18

## 2024-07-18 PROBLEM — I50.21 ACUTE SYSTOLIC CONGESTIVE HEART FAILURE (H): Status: RESOLVED | Noted: 2023-04-03 | Resolved: 2024-07-18

## 2024-07-18 LAB
ALT SERPL W P-5'-P-CCNC: 20 U/L (ref 0–70)
ANION GAP SERPL CALCULATED.3IONS-SCNC: 11 MMOL/L (ref 7–15)
BUN SERPL-MCNC: 29.1 MG/DL (ref 8–23)
CALCIUM SERPL-MCNC: 9.5 MG/DL (ref 8.8–10.4)
CHLORIDE SERPL-SCNC: 105 MMOL/L (ref 98–107)
CHOLEST SERPL-MCNC: 144 MG/DL
CREAT SERPL-MCNC: 1.08 MG/DL (ref 0.67–1.17)
EGFRCR SERPLBLD CKD-EPI 2021: 78 ML/MIN/1.73M2
FASTING STATUS PATIENT QL REPORTED: NO
FASTING STATUS PATIENT QL REPORTED: NO
GLUCOSE SERPL-MCNC: 99 MG/DL (ref 70–99)
HBA1C MFR BLD: 6 % (ref 4–6.2)
HCO3 SERPL-SCNC: 25 MMOL/L (ref 22–29)
HDLC SERPL-MCNC: 43 MG/DL
LDLC SERPL CALC-MCNC: 64 MG/DL
NONHDLC SERPL-MCNC: 101 MG/DL
POTASSIUM SERPL-SCNC: 4.2 MMOL/L (ref 3.4–5.3)
SODIUM SERPL-SCNC: 141 MMOL/L (ref 135–145)
TRIGL SERPL-MCNC: 187 MG/DL

## 2024-07-18 PROCEDURE — 99214 OFFICE O/P EST MOD 30 MIN: CPT | Mod: 25 | Performed by: INTERNAL MEDICINE

## 2024-07-18 PROCEDURE — 99207 PR FOOT EXAM NO CHARGE: CPT | Performed by: INTERNAL MEDICINE

## 2024-07-18 PROCEDURE — 82374 ASSAY BLOOD CARBON DIOXIDE: CPT | Mod: ZL | Performed by: INTERNAL MEDICINE

## 2024-07-18 PROCEDURE — 91320 SARSCV2 VAC 30MCG TRS-SUC IM: CPT | Performed by: INTERNAL MEDICINE

## 2024-07-18 PROCEDURE — 99396 PREV VISIT EST AGE 40-64: CPT | Mod: 25 | Performed by: INTERNAL MEDICINE

## 2024-07-18 PROCEDURE — 82565 ASSAY OF CREATININE: CPT | Mod: ZL | Performed by: INTERNAL MEDICINE

## 2024-07-18 PROCEDURE — 90480 ADMN SARSCOV2 VAC 1/ONLY CMP: CPT | Performed by: INTERNAL MEDICINE

## 2024-07-18 PROCEDURE — 83036 HEMOGLOBIN GLYCOSYLATED A1C: CPT | Mod: ZL | Performed by: INTERNAL MEDICINE

## 2024-07-18 PROCEDURE — 80061 LIPID PANEL: CPT | Mod: ZL | Performed by: INTERNAL MEDICINE

## 2024-07-18 PROCEDURE — 84460 ALANINE AMINO (ALT) (SGPT): CPT | Mod: ZL | Performed by: INTERNAL MEDICINE

## 2024-07-18 PROCEDURE — 36415 COLL VENOUS BLD VENIPUNCTURE: CPT | Mod: ZL | Performed by: INTERNAL MEDICINE

## 2024-07-18 RX ORDER — METOPROLOL SUCCINATE 25 MG/1
25 TABLET, EXTENDED RELEASE ORAL DAILY
Qty: 90 TABLET | Refills: 4 | Status: SHIPPED | OUTPATIENT
Start: 2024-07-18

## 2024-07-18 RX ORDER — ROSUVASTATIN CALCIUM 5 MG/1
5 TABLET, COATED ORAL DAILY
Qty: 90 TABLET | Refills: 4 | Status: SHIPPED | OUTPATIENT
Start: 2024-07-18

## 2024-07-18 RX ORDER — METFORMIN HCL 500 MG
500 TABLET, EXTENDED RELEASE 24 HR ORAL
Qty: 90 TABLET | Refills: 4 | Status: SHIPPED | OUTPATIENT
Start: 2024-07-18

## 2024-07-18 RX ORDER — LISINOPRIL 2.5 MG/1
2.5 TABLET ORAL DAILY
Qty: 90 TABLET | Refills: 4 | Status: SHIPPED | OUTPATIENT
Start: 2024-07-18

## 2024-07-18 RX ORDER — BUPROPION HYDROCHLORIDE 150 MG/1
150 TABLET ORAL EVERY MORNING
Qty: 90 TABLET | Refills: 4 | Status: SHIPPED | OUTPATIENT
Start: 2024-07-18

## 2024-07-18 RX ORDER — FLUOXETINE 10 MG/1
10 CAPSULE ORAL DAILY
Qty: 90 CAPSULE | Refills: 2 | Status: SHIPPED | OUTPATIENT
Start: 2024-07-18 | End: 2024-09-03

## 2024-07-18 RX ORDER — EMPAGLIFLOZIN 10 MG/1
10 TABLET, FILM COATED ORAL DAILY
Qty: 90 TABLET | Refills: 4 | Status: SHIPPED | OUTPATIENT
Start: 2024-07-18

## 2024-07-18 RX ORDER — APIXABAN 5 MG/1
5 TABLET, FILM COATED ORAL 2 TIMES DAILY
Qty: 180 TABLET | Refills: 4 | Status: SHIPPED | OUTPATIENT
Start: 2024-07-18

## 2024-07-18 SDOH — HEALTH STABILITY: PHYSICAL HEALTH: ON AVERAGE, HOW MANY DAYS PER WEEK DO YOU ENGAGE IN MODERATE TO STRENUOUS EXERCISE (LIKE A BRISK WALK)?: 5 DAYS

## 2024-07-18 SDOH — HEALTH STABILITY: PHYSICAL HEALTH: ON AVERAGE, HOW MANY MINUTES DO YOU ENGAGE IN EXERCISE AT THIS LEVEL?: 20 MIN

## 2024-07-18 ASSESSMENT — ANXIETY QUESTIONNAIRES
7. FEELING AFRAID AS IF SOMETHING AWFUL MIGHT HAPPEN: SEVERAL DAYS
GAD7 TOTAL SCORE: 16
GAD7 TOTAL SCORE: 16
6. BECOMING EASILY ANNOYED OR IRRITABLE: NEARLY EVERY DAY
3. WORRYING TOO MUCH ABOUT DIFFERENT THINGS: MORE THAN HALF THE DAYS
5. BEING SO RESTLESS THAT IT IS HARD TO SIT STILL: MORE THAN HALF THE DAYS
4. TROUBLE RELAXING: NEARLY EVERY DAY
7. FEELING AFRAID AS IF SOMETHING AWFUL MIGHT HAPPEN: SEVERAL DAYS
1. FEELING NERVOUS, ANXIOUS, OR ON EDGE: NEARLY EVERY DAY
2. NOT BEING ABLE TO STOP OR CONTROL WORRYING: MORE THAN HALF THE DAYS
GAD7 TOTAL SCORE: 16

## 2024-07-18 ASSESSMENT — PATIENT HEALTH QUESTIONNAIRE - PHQ9
10. IF YOU CHECKED OFF ANY PROBLEMS, HOW DIFFICULT HAVE THESE PROBLEMS MADE IT FOR YOU TO DO YOUR WORK, TAKE CARE OF THINGS AT HOME, OR GET ALONG WITH OTHER PEOPLE: VERY DIFFICULT
SUM OF ALL RESPONSES TO PHQ QUESTIONS 1-9: 7
SUM OF ALL RESPONSES TO PHQ QUESTIONS 1-9: 7

## 2024-07-18 ASSESSMENT — PAIN SCALES - GENERAL: PAINLEVEL: SEVERE PAIN (7)

## 2024-07-18 ASSESSMENT — SOCIAL DETERMINANTS OF HEALTH (SDOH): HOW OFTEN DO YOU GET TOGETHER WITH FRIENDS OR RELATIVES?: PATIENT DECLINED

## 2024-07-18 NOTE — PROGRESS NOTES
Preventive Care Visit  [facility]  Adin Ruano MD, Internal Medicine  Jul 18, 2024      1. Routine general medical examination at a health care facility  Prostate and  colon cancer screening are up-to-date    2. Chronic atrial fibrillation (H)  8. Atrial fibrillation status post cardioversion (H)  He follows with [facility] cardiology.  It is unclear if they want him to continue on his Eliquis now that he has been out of A-fib for quite a while.  I would defer to the cardiologist and asked him to contact their clinic.    3. Type 2 diabetes mellitus without complication, without long-term current use of insulin (H)  At goal, no change.  His cardiologist had indicated that there was no indication for ACE inhibitor however based on the HOPE trial I recommend continuing lisinopril for kidney protection regarding diabetes.  - lisinopril (ZESTRIL) 2.5 MG tablet; Take 1 tablet (2.5 mg) by mouth daily  Dispense: 90 tablet; Refill: 4  - metFORMIN (GLUCOPHAGE XR) 500 MG 24 hr tablet; Take 1 tablet (500 mg) by mouth daily (with dinner)  Dispense: 90 tablet; Refill: 4  - JARDIANCE 10 MG TABS tablet; Take 1 tablet (10 mg) by mouth daily  Dispense: 90 tablet; Refill: 4  - rosuvastatin (CRESTOR) 5 MG tablet; Take 1 tablet (5 mg) by mouth daily  Dispense: 90 tablet; Refill: 4  - FOOT EXAM    4. Chronic neck and back pain  He is having a significant amount of ongoing neck and back pain which has been present for years and have intermittent radicular features currently worse in the neck with some radiation into the upper arm.  He has not been able to improve upon his symptoms with the use of chiropractic or physical therapy.  I recommend a referral to the spine program here and see if he can improve on it with intensive physical therapy.  If not would order MRIs followed by 6 neurosurgery consultation.  - Occupational Medicine Referral; Future    5. Irritability  6. Stress at home  Several of his family  members including girlfriend, sister as well as boss have told him that he is more irritable lately.  He stopped his paroxetine due to erectile dysfunction.  We discussed options and decided on a trial of another SSRI and chose fluoxetine.  Adverse effects were discussed.  Close follow-up recommended.  - Adult Mental Health  Referral; Future  - FLUoxetine (PROZAC) 10 MG capsule; Take 1 capsule (10 mg) by mouth daily  Dispense: 90 capsule; Refill: 2    7. Hordeolum externum of left lower eyelid  Recommend warm compresses and moisturizing eyedrops.  If not improving would refer to Dr. Newman of ophthalmology.      9. Mood disorder (H24)  - Adult Mental [facility]  Referral; Future  - FLUoxetine (PROZAC) 10 MG capsule; Take 1 capsule (10 mg) by mouth daily  Dispense: 90 capsule; Refill: 2    10. MODESTA (generalized anxiety disorder)  - Adult Mental [facility]  Referral; Future  - FLUoxetine (PROZAC) 10 MG capsule; Take 1 capsule (10 mg) by mouth daily  Dispense: 90 capsule; Refill: 2    11. Anxiety  - Adult Mental [facility]  Referral; Future  - FLUoxetine (PROZAC) 10 MG capsule; Take 1 capsule (10 mg) by mouth daily  Dispense: 90 capsule; Refill: 2  - buPROPion (WELLBUTRIN XL) 150 MG 24 hr tablet; Take 1 tablet (150 mg) by mouth every morning  Dispense: 90 tablet; Refill: 4    12. Major depressive disorder, recurrent episode, mild (H24)  - [facility]; Future  - FLUoxetine (PROZAC) 10 MG capsule; Take 1 capsule (10 mg) by mouth daily  Dispense: 90 capsule; Refill: 2  - buPROPion (WELLBUTRIN XL) 150 MG 24 hr tablet; Take 1 tablet (150 mg) by mouth every morning  Dispense: 90 tablet; Refill: 4    13. MARIANGEL on CPAP  Mr. Madrid has been adherent with their use of CPAP/APAP/BiPAP as prescribed.  It has been effective in managing obstructive sleep apnea.  I recommend continued use.    14. Chronic heart failure with preserved ejection fraction (H)  Remarkably his ejection fraction  has returned to normal.  He follows with cardiology    15. Diabetes mellitus, type 2 (H)  - HEMOGLOBIN A1C; Future  - BASIC METABOLIC PANEL; Future  - HEMOGLOBIN A1C  - BASIC METABOLIC PANEL    16. Mixed hyperlipidemia  Continue statin due for lipid panel  - ALT; Future  - Lipid Profile; Future  - ALT  - Lipid Profile      Patient Instructions    -- Consult to [facility] Sp[facility] program   -- If not improving, or red flags would get MRI of cervical and lumbar     -- Continue bupropion   -- Start fluoxetine   -- See a therapist   -- Daily outdoor exercise   -- Gratitude list     -- Ask Cardiology about Eliquis   -- if they stop Eliquis, would start aspirin 81 mg     -- Max 4000 mg acetaminophen daily    Crisis Resources for Mental Health    Local:   -- dial 211: First Call for Help (https://dnbwekdzo757.[email or web address]/)    National:   -- dial or txt 988 (https://Plexisoft/)   -- txt HOME to 465603 to connect with a crisis counselor. (https://www.[email or web address]    More resources:  American Psychological Association: https://www.apa.org/topics/crisis-hotlines      Aspects of Diabetes we can improve:  Hemoglobin A1c Lab Results   Component Value Date    A1C 5.9 09/19/2023    A1C 6.9 03/27/2023    A1C 6.4 03/01/2022    Goal range is under 8. Best is 6.5 to 7   Blood Pressure 102/70 Goal to keep less than 140/90   Tobacco  reports that he has never smoked. He has never used smokeless tobacco. Goal to abstain from tobacco   Aspirin On eliquis Aspirin reduces risk of heart disease and stroke   ACE/ARB lisinopril These medications reduce risk of kidney disease   Cholesterol crestor Statins reduce risk of heart disease and stroke   Eye Exam annual Annual diabetic eye exam   Healthy weight Body mass index is 29.44 kg/m . Goal BMI under 30, best is under 25.      -- I'm trying to exercise daily (goal at least 20 min/day) with moderate aerobic activity   -- Eat healthy (resources from ADA at http://www.diabetes.org/)   -- I'm  taking good care of my feet. Consider seeing the Podiatrist   -- Check blood sugars as directed, record in log book and bring to every appointment   -- Goal sugar before breakfast: under 140   -- Goal sugar 2 hours after supper: under 170   -- Next diabetes lab draw: 6-12 months   -- Next diabetes office visit: 6-12 months      Back Pain    Modifiable Risk Factors  Maintain a healthy weight. Losing 5% can be very helpful.  A loss of 10 lbs, will result in 80 lbs of force lifted off of your back.  Core muscle strength. Keeping up with a physical therapy home exercise program, and/or regular yoga practice keeps your back healthy.  Don't use tobacco products.  People that smoke have more bulging discs, and they take longer to heal.  Try not to injure it. Be smart when doing activities that require bending, twisting and lifting such as gardening.     Treatments   -- Try Voltaren (diclofenac) gel. Apply topically to low back up to 4 times per day as needed for pain.   -- Okay to use NSAIDs by mouth, but shortest duration is best as they can hurt stomach and kidney  Example: Ibuprofen 600 mg (3 tablets) 3 times per day for 7 days, then as needed   -- Rest   -- Ice/heat whichever feels better   -- Topicals: Aspercreme/IcyHot, lidocaine, capsaicin   -- Schedule visit for physical therapy   -- Monitor for warning signs including foot drop, groin numbness, new incontinence or other concerns and return same day for evaluation   -- Otherwise, return if you are not improving over the next 4 weeks    Patient Education      Relieving Back Pain  Back pain is a common problem. You can strain back muscles by lifting too much weight or just by moving the wrong way. Back strain can be uncomfortable, even painful. And it can take weeks or months to improve. To help yourself feel better and prevent future back strains, try these tips.  Important: Don't give aspirin to children or teens without first discussing it with your child's  healthcare provider.  Ice    Ice reduces muscle pain and swelling. It helps most during the first 24 to 48 hours after an injury.  Wrap an ice pack or a bag of frozen peas in a thin towel. Never put ice directly on your skin.  Place the ice where your back hurts the most.  Don t ice for more than 20 minutes at a time.  You can use ice several times a day.  Medicines  Over-the-counter pain relievers include acetaminophen and anti-inflammatory medicines, which includes aspirin, naproxen, or ibuprofen. They can help ease discomfort. Some also reduce swelling.  Tell your healthcare provider about any medicines you are already taking.  Take medicines only as directed.  Manipulation and massage  Having manipulation by an osteopathic doctor or chiropractor may be helpful. Getting a massage also may help.   Heat  After the first 48 hours, heat can relax sore muscles and improve blood flow.  Try a warm bath or shower. Or use a heating pad set on low. To prevent a burn, keep a cloth between you and the heating pad.  Don t use a heating pad for more than 15 minutes at a time. Never sleep on a heating pad.  Date Last Reviewed: 6/1/2018 2000-2019 [employer] [address] [place] [address]. All rights reserved. This information is not intended as a substitute for professional medical care. Always follow your healthcare professional's instructions.      [place] counselors/therapists   Telephone Kids? Address   [facility] [place] [phone] Yes, 12+ [address]  [email or web address]   [facility]  (Many counselors) [phone] Yes [address]   [email or web address]   [facility]  (Many counselors) [phone] Yes [address]   [email or web address]   [facility]  (Many counselors) [phone] Yes [address]  [email or web address]   [facility]  Gerardo Shin [phone] Yes [facility]  [address]  [email or web address]   [facility]  Sheldon Alecia [phone] Yes [address]   [address]  [email or web address]   Genia Ohara [phone]  [address]  [email or web address]   [facility]  Vielka Mondragon  And others... [phone]  [address] [facility] [address]  [email or web address]   Marcela Matos [phone]  [address]   Phyllis Jasmina [phone]  [address] [place] [address]   Martha Maynard [phone] Yes [address]   Soraya Jeramy [phone]  [address] [place]    Doug Nguyen [phone]  [address] [place] [address]   [facility]  Sofie Washington [phone]  [address] [place] [address]    Ruth Zazueta [phone]  [address] [place] [address] [address]  ([facility])  [email or web address]   [facility]  Aubrey Gudino [phone]  [address]  [email or web address]  [email or web address]   [facility]  Michele Rinne Cindy Thomas [phone]     [facility]: Tammy [phone] Yes AINSLEY Munoz  [address]  [email or web address]   [facility]: [place] [phone] Yes AINSLEY Morales  [address] [facility]  [email or web address]              Signed, Adin Ruano MD, FAAP, FACP  Internal Medicine & Pediatrics      Subjective   Adin is a 64 year old, presenting for the following:  Physical, Recheck Medication (Wellbutrin  decrease dose and Metoprolol increase to 25 mg, stopping Lisinopril, discuss Crestor), and Anxiety (Irritable, lack of interest, heat sensitivity)         Health Care Directive  Patient does not have a Health Care Directive or Living Will: Patient states has Advance Directive and will bring in a copy to clinic.    History of Present Illness       Back Pain:  He presents for  follow up of back pain. Patient's back pain is a chronic problem.  Location of back pain:  Right lower back, left lower back, right side of neck and left side of neck  Description of back pain: burning, gnawing and sharp  Back pain spreads: left shoulder and right side of neck    Since patient first noticed back pain, pain is: always present, but gets better and worse  Does back pain interfere with his job:  Yes       Mental Health Follow-up:  Patient presents to follow-up on Depression & Anxiety.Patient's depression since last visit has been:  Good  The patient is having other symptoms associated with depression.  Patient's anxiety since last visit has been:  Worse  The patient is having other symptoms associated with anxiety.  Any significant life events: job concerns, health concerns and other  Patient is not feeling anxious or having panic attacks.  Patient has no concerns about alcohol or drug use.    Reason for visit:  Check my meds to get off some and also some anxiety symptoms??    He eats 0-1 servings of fruits and vegetables daily.He consumes 0 sweetened beverage(s) daily.He exercises with enough effort to increase his heart rate 10 to 19 minutes per day.  He exercises with enough effort to increase his heart rate 5 days per week.   He is taking medications regularly.                7/18/2024   General Health   How would you rate your overall physical health? (!) FAIR   Feel stress (tense, anxious, or unable to sleep) Very much      (!) STRESS CONCERN      7/18/2024   Nutrition   Three or more servings of calcium each day? (!) NO   Diet: Low salt   How many servings of fruit and vegetables per day? (!) 0-1   How many sweetened beverages each day? 0-1            7/18/2024   Exercise   Days per week of moderate/strenous exercise 5 days   Average minutes spent exercising at this level 20 min            7/18/2024   Social Factors   Frequency of gathering with friends or relatives Patient declined   Worry food  "won't last until get money to buy more No   Food not last or not have enough money for food? No   Do you have housing? (Housing is defined as stable permanent housing and does not include staying ouside in a car, in a tent, in an abandoned building, in an overnight shelter, or couch-surfing.) Yes   Are you worried about losing your housing? No   Lack of transportation? No   Unable to get utilities (heat,electricity)? No            7/18/2024   Fall Risk   Fallen 2 or more times in the past year? No   Trouble with walking or balance? No             7/18/2024   Dental   Dentist two times every year? Yes            7/18/2024   TB Screening   Were you born outside of the [place]? No          Today's PHQ-9 Score:       7/18/2024     8:52 AM   PHQ-9 SCORE   PHQ-9 Total Score MyChart 7 (Mild depression)   PHQ-9 Total Score 7         7/18/2024   Substance Use   Alcohol more than 3/day or more than 7/wk Not Applicable   Do you use any other substances recreationally? No        Social History     Tobacco Use    Smoking status: Never    Smokeless tobacco: Never   Vaping Use    Vaping status: Never Used   Substance Use Topics    Alcohol use: No    Drug use: No     Comment: Drug use: No           7/18/2024   STI Screening   New sexual partner(s) since last STI/HIV test? (!) YES       Last PSA:   Prostate Specific Antigen Screen   Date Value Ref Range Status   09/19/2023 1.01 0.00 - 4.50 ng/mL Final     PSA Tumor Marker   Date Value Ref Range Status   03/27/2023 1.54 0.00 - 4.50 ng/mL Final   03/01/2022 1.07 0.00 - 4.00 ug/L Final     ASCVD Risk   The ASCVD Risk score (Manpreet MARTINEZ, et al., 2019) failed to calculate for the following reasons:    The patient has a prior MI or stroke diagnosis           Reviewed and updated as needed this visit by Provider                             Objective    Exam  /70   Pulse 60   Temp 98.3  F (36.8  C) (Tympanic)   Resp 16   Ht 1.74 m (5' 8.5\")   Wt 89.1 kg (196 lb 8 oz)   SpO2 " "95%   BMI 29.44 kg/m     Estimated body mass index is 29.44 kg/m  as calculated from the following:    Height as of this encounter: 1.74 m (5' 8.5\").    Weight as of this encounter: 89.1 kg (196 lb 8 oz).    Physical Exam  Gen: Alert, NAD.  Neck: No carotid bruits  CV: RRR no m/r/g  Pulm: CTAB, no w/r/r. No increased work of breathing  Msk: No LE edema  Neuro: Grossly intact  Skin: No concerning lesions.  Psychiatric: Normal affect and insight. Does not appear anxious or depressed.    Foot Exam:  7/18/2024  Intact to monofilament bilaterally.  Skin intact without erythema.    Foot/Ankle Musculoskeletal Exam          Signed Electronically by: Adin Ruano MD    "

## 2024-07-18 NOTE — NURSING NOTE
"Chief Complaint   Patient presents with    Physical    Recheck Medication     Wellbutrin  decrease dose and Metoprolol increase to 25 mg, stopping Lisinopril, discuss Crestor    Anxiety     Irritable, lack of interest, heat sensitivity       Initial /70   Pulse 60   Temp 98.3  F (36.8  C) (Tympanic)   Resp 16   Ht 1.74 m (5' 8.5\")   Wt 89.1 kg (196 lb 8 oz)   SpO2 95%   BMI 29.44 kg/m   Estimated body mass index is 29.44 kg/m  as calculated from the following:    Height as of this encounter: 1.74 m (5' 8.5\").    Weight as of this encounter: 89.1 kg (196 lb 8 oz).  Medication Review: complete    The next two questions are to help us understand your food security.  If you are feeling you need any assistance in this area, we have resources available to support you today.          7/18/2024   SDOH- Food Insecurity   Within the past 12 months, did you worry that your food would run out before you got money to buy more? N   Within the past 12 months, did the food you bought just not last and you didn t have money to get more? N            Health Care Directive:  Patient does not have a Health Care Directive or Living Will: Patient states has Advance Directive and will bring in a copy to clinic.    Norma J. Gosselin, LPN      "

## 2024-07-18 NOTE — PATIENT INSTRUCTIONS
-- Consult to [facility]   -- If not improving, or red flags would get MRI of cervical and lumbar     -- Continue bupropion   -- Start fluoxetine   -- See a therapist   -- Daily outdoor exercise   -- Gratitude list     -- Ask Cardiology about Eliquis   -- if they stop Eliquis, would start aspirin 81 mg     -- Max 4000 mg acetaminophen daily    Crisis Resources for Mental Health    Local:   -- dial 211: First Call for Help ([email or web address]    National:   -- dial or txt 988 (https://XIHA/)   -- txt HOME to 242978 to connect with a crisis counselor. ([email or web address]    More resources:  American Psychological Association: https://www.apa.org/topics/crisis-hotlines      Aspects of Diabetes we can improve:  Hemoglobin A1c Lab Results   Component Value Date    A1C 5.9 09/19/2023    A1C 6.9 03/27/2023    A1C 6.4 03/01/2022    Goal range is under 8. Best is 6.5 to 7   Blood Pressure 102/70 Goal to keep less than 140/90   Tobacco  reports that he has never smoked. He has never used smokeless tobacco. Goal to abstain from tobacco   Aspirin On eliquis Aspirin reduces risk of heart disease and stroke   ACE/ARB lisinopril These medications reduce risk of kidney disease   Cholesterol crestor Statins reduce risk of heart disease and stroke   Eye Exam annual Annual diabetic eye exam   Healthy weight Body mass index is 29.44 kg/m . Goal BMI under 30, best is under 25.      -- I'm trying to exercise daily (goal at least 20 min/day) with moderate aerobic activity   -- Eat healthy (resources from ADA at http://www.diabetes.org/)   -- I'm taking good care of my feet. Consider seeing the Podiatrist   -- Check blood sugars as directed, record in log book and bring to every appointment   -- Goal sugar before breakfast: under 140   -- Goal sugar 2 hours after supper: under 170   -- Next diabetes lab draw: 6-12 months   -- Next diabetes office visit: 6-12 months      Back  Pain    Modifiable Risk Factors  Maintain a healthy weight. Losing 5% can be very helpful.  A loss of 10 lbs, will result in 80 lbs of force lifted off of your back.  Core muscle strength. Keeping up with a physical therapy home exercise program, and/or regular yoga practice keeps your back healthy.  Don't use tobacco products.  People that smoke have more bulging discs, and they take longer to heal.  Try not to injure it. Be smart when doing activities that require bending, twisting and lifting such as gardening.     Treatments   -- Try Voltaren (diclofenac) gel. Apply topically to low back up to 4 times per day as needed for pain.   -- Okay to use NSAIDs by mouth, but shortest duration is best as they can hurt stomach and kidney  Example: Ibuprofen 600 mg (3 tablets) 3 times per day for 7 days, then as needed   -- Rest   -- Ice/heat whichever feels better   -- Topicals: Aspercreme/IcyHot, lidocaine, capsaicin   -- Schedule visit for physical therapy   -- Monitor for warning signs including foot drop, groin numbness, new incontinence or other concerns and return same day for evaluation   -- Otherwise, return if you are not improving over the next 4 weeks    Patient Education       Relieving Back Pain  Back pain is a common problem. You can strain back muscles by lifting too much weight or just by moving the wrong way. Back strain can be uncomfortable, even painful. And it can take weeks or months to improve. To help yourself feel better and prevent future back strains, try these tips.  Important: Don't give aspirin to children or teens without first discussing it with your child's healthcare provider.  Ice    Ice reduces muscle pain and swelling. It helps most during the first 24 to 48 hours after an injury.  Wrap an ice pack or a bag of frozen peas in a thin towel. Never put ice directly on your skin.  Place the ice where your back hurts the most.  Don t ice for more than 20 minutes at a time.  You can use ice  several times a day.  Medicines  Over-the-counter pain relievers include acetaminophen and anti-inflammatory medicines, which includes aspirin, naproxen, or ibuprofen. They can help ease discomfort. Some also reduce swelling.  Tell your healthcare provider about any medicines you are already taking.  Take medicines only as directed.  Manipulation and massage  Having manipulation by an osteopathic doctor or chiropractor may be helpful. Getting a massage also may help.   Heat  After the first 48 hours, heat can relax sore muscles and improve blood flow.  Try a warm bath or shower. Or use a heating pad set on low. To prevent a burn, keep a cloth between you and the heating pad.  Don t use a heating pad for more than 15 minutes at a time. Never sleep on a heating pad.  Date Last Reviewed: 6/1/2018 2000-2019 [employer]. [address] [place] [address], [place] [address]. All rights reserved. This information is not intended as a substitute for professional medical care. Always follow your healthcare professional's instructions.      [facility] counselors/therapists   Telephone Kids? Address   [facility] [phone] Yes, 12+ [address]  [email or web address]  (Many counselors) [phone] Yes [address]   [email or web address]   [facility]  (Many counselors) [phone] Yes [address] [place]   [email or web address]   [facility]  (Many counselors) [phone] Yes [address]  [email or web address]   [facility]  Gerardo Shin [phone] Yes [facility]  [address]  [email or web address]   Alecia Alston [phone] Yes [address]   [address]  [email or web address]   Genia Ohara [phone]  [address]  [email or web address]   [facility]  Ihsan  Jeramy Mondragon  And others... [phone]  [address]  [email or web address]   Marcela Matos [phone]  [address]   Phyllis Freedman [phone]  [address]   Martha Maynard [phone] Yes [address]   Soraya Deshpande [phone]  [address] [place]   Doug Nguyen [phone]  [address] [facility] [address]   [facility]  Sofie Washington [phone]  [address] [place] [address]    Ruth Zazueta [phone]  [address] [place] [address] [address]  ([facility])  [email or web address]   [facility]  Aubrey Gudino [phone]  [address]  [email or web address]   [facility]  Michele Rinne Cindy Thomas [phone]     [facility]: Tammy [phone] Yes AINSLEY Munoz  [address]  [email or web address]   [facility]: [place] [phone] Yes AINSLEY Morales  [address] [facility]  [email or web address]       Patient Education   Preventive Care Advice   This is general advice given by our system to help you stay healthy. However, your care team may have specific advice just for you. Please talk to your care team about your preventive care needs.  Nutrition  Eat 5 or more servings of fruits and vegetables each day.  Try wheat bread, brown rice and whole grain pasta (instead of white bread, rice, and pasta).  Get enough calcium and vitamin D. Check the label on foods and aim for 100% of the RDA (recommended daily allowance).  Lifestyle  Exercise at least 150 minutes each week  (30 minutes a day, 5 days a week).  Do muscle strengthening activities 2 days a week. These help control your weight and prevent disease.  No smoking.  Wear sunscreen to prevent skin cancer.  Have a dental exam and cleaning every 6 months.  Yearly exams  See your health care team every year to talk about:  Any changes in your  health.  Any medicines your care team has prescribed.  Preventive care, family planning, and ways to prevent chronic diseases.  Shots (vaccines)   HPV shots (up to age 26), if you've never had them before.  Hepatitis B shots (up to age 59), if you've never had them before.  COVID-19 shot: Get this shot when it's due.  Flu shot: Get a flu shot every year.  Tetanus shot: Get a tetanus shot every 10 years.  Pneumococcal, hepatitis A, and RSV shots: Ask your care team if you need these based on your risk.  Shingles shot (for age 50 and up)  General health tests  Diabetes screening:  Starting at age 35, Get screened for diabetes at least every 3 years.  If you are younger than age 35, ask your care team if you should be screened for diabetes.  Cholesterol test: At age 39, start having a cholesterol test every 5 years, or more often if advised.  Bone density scan (DEXA): At age 50, ask your care team if you should have this scan for osteoporosis (brittle bones).  Hepatitis C: Get tested at least once in your life.  STIs (sexually transmitted infections)  Before age 24: Ask your care team if you should be screened for STIs.  After age 24: Get screened for STIs if you're at risk. You are at risk for STIs (including HIV) if:  You are sexually active with more than one person.  You don't use condoms every time.  You or a partner was diagnosed with a sexually transmitted infection.  If you are at risk for HIV, ask about PrEP medicine to prevent HIV.  Get tested for HIV at least once in your life, whether you are at risk for HIV or not.  Cancer screening tests  Cervical cancer screening: If you have a cervix, begin getting regular cervical cancer screening tests starting at age 21.  Breast cancer scan (mammogram): If you've ever had breasts, begin having regular mammograms starting at age 40. This is a scan to check for breast cancer.  Colon cancer screening: It is important to start screening for colon cancer at age 45.  Have  a colonoscopy test every 10 years (or more often if you're at risk) Or, ask your provider about stool tests like a FIT test every year or Cologuard test every 3 years.  To learn more about your testing options, visit:   .  For help making a decision, visit:   [email or web address].  Prostate cancer screening test: If you have a prostate, ask your care team if a prostate cancer screening test (PSA) at age 55 is right for you.  Lung cancer screening: If you are a current or former smoker ages 50 to 80, ask your care team if ongoing lung cancer screenings are right for you.  For informational purposes only. Not to replace the advice of your health care provider. Copyright   2023 [employer]. All rights reserved. Clinically reviewed by the [facility] Transitions Program. [employer] 057180 - REV 01/24.  Learning About Stress  What is stress?     Stress is your body's response to a hard situation. Your body can have a physical, emotional, or mental response. Stress is a fact of life for most people, and it affects everyone differently. What causes stress for you may not be stressful for someone else.  A lot of things can cause stress. You may feel stress when you go on a job interview, take a test, or run a race. This kind of short-term stress is normal and even useful. It can help you if you need to work hard or react quickly. For example, stress can help you finish an important job on time.  Long-term stress is caused by ongoing stressful situations or events. Examples of long-term stress include long-term health problems, ongoing problems at work, or conflicts in your family. Long-term stress can harm your health.  How does stress affect your health?  When you are stressed, your body responds as though you are in danger. It makes hormones that speed up your heart, make you breathe faster, and give you a burst of energy. This is called the fight-or-flight stress response. If the stress is over  quickly, your body goes back to normal and no harm is done.  But if stress happens too often or lasts too long, it can have bad effects. Long-term stress can make you more likely to get sick, and it can make symptoms of some diseases worse. If you tense up when you are stressed, you may develop neck, shoulder, or low back pain. Stress is linked to high blood pressure and heart disease.  Stress also harms your emotional health. It can make you bernard, tense, or depressed. Your relationships may suffer, and you may not do well at work or school.  What can you do to manage stress?  You can try these things to help manage stress:   Do something active. Exercise or activity can help reduce stress. Walking is a great way to get started. Even everyday activities such as housecleaning or yard work can help.  Try yoga or christine chi. These techniques combine exercise and meditation. You may need some training at first to learn them.  Do something you enjoy. For example, listen to music or go to a movie. Practice your hobby or do volunteer work.  Meditate. This can help you relax, because you are not worrying about what happened before or what may happen in the future.  Do guided imagery. Imagine yourself in any setting that helps you feel calm. You can use online videos, books, or a teacher to guide you.  Do breathing exercises. For example:  From a standing position, bend forward from the waist with your knees slightly bent. Let your arms dangle close to the floor.  Breathe in slowly and deeply as you return to a standing position. Roll up slowly and lift your head last.  Hold your breath for just a few seconds in the standing position.  Breathe out slowly and bend forward from the waist.  Let your feelings out. Talk, laugh, cry, and express anger when you need to. Talking with supportive friends or family, a counselor, or a teresa leader about your feelings is a healthy way to relieve stress. Avoid discussing your feelings with  "people who make you feel worse.  Write. It may help to write about things that are bothering you. This helps you find out how much stress you feel and what is causing it. When you know this, you can find better ways to cope.  What can you do to prevent stress?  You might try some of these things to help prevent stress:  Manage your time. This helps you find time to do the things you want and need to do.  Get enough sleep. Your body recovers from the stresses of the day while you are sleeping.  Get support. Your family, friends, and community can make a difference in how you experience stress.  Limit your news feed. Avoid or limit time on social media or news that may make you feel stressed.  Do something active. Exercise or activity can help reduce stress. Walking is a great way to get started.  Where can you learn more?  Go to https://www.Wrightspeed.Blackboard/patiented  Enter N032 in the search box to learn more about \"Learning About Stress.\"  Current as of: October 24, 2023               Content Version: 14.0    4877-9432 [employer].   Care instructions adapted under license by your healthcare professional. If you have questions about a medical condition or this instruction, always ask your healthcare professional. [employer] disclaims any warranty or liability for your use of this information.      Learning About Depression Screening  What is depression screening?  Depression screening is a way to see if you have depression symptoms. It may be done by a doctor or counselor. It's often part of a routine checkup. That's because your mental health is just as important as your physical health.  Depression is a mental health condition that affects how you feel, think, and act. You may:  Have less energy.  Lose interest in your daily activities.  Feel sad and grouchy for a long time.  Depression is very common. It affects people of all ages.  Many things can lead to depression. Some people become " "depressed after they have a stroke or find out they have a major illness like cancer or heart disease. The death of a loved one or a breakup may lead to depression. It can run in families. Most experts believe that a combination of inherited genes and stressful life events can cause it.  What happens during screening?  You may be asked to fill out a form about your depression symptoms. You and the doctor will discuss your answers. The doctor may ask you more questions to learn more about how you think, act, and feel.  What happens after screening?  If you have symptoms of depression, your doctor will talk to you about your options.  Doctors usually treat depression with medicines or counseling. Often, combining the two works best. Many people don't get help because they think that they'll get over the depression on their own. But people with depression may not get better unless they get treatment.  The cause of depression is not well understood. There may be many factors involved. But if you have depression, it's not your fault.  A serious symptom of depression is thinking about death or suicide. If you or someone you care about talks about this or about feeling hopeless, get help right away.  It's important to know that depression can be treated. Medicine, counseling, and self-care may help.  Where can you learn more?  Go to https://www.Retidoc.net/patiented  Enter T185 in the search box to learn more about \"Learning About Depression Screening.\"  Current as of: June 24, 2023               Content Version: 14.0    2752-8588 [employer].   Care instructions adapted under license by your healthcare professional. If you have questions about a medical condition or this instruction, always ask your healthcare professional. [employer] disclaims any warranty or liability for your use of this information.      Safer Sex: Care Instructions  Overview  Safer sex is a way to reduce your risk of " getting a sexually transmitted infection (STI). It can also help prevent pregnancy.  Several products can help you practice safer sex and reduce your chance of STIs. One of the best is a condom. There are internal and external condoms. You can use a special rubber sheet (dental dam) for protection during oral sex. Disposable gloves can keep your hands from touching blood, semen, or other body fluids that can carry infections.  Remember that birth control methods such as diaphragms, IUDs, foams, and birth control pills do not stop you from getting STIs.  Follow-up care is a key part of your treatment and safety. Be sure to make and go to all appointments, and call your doctor if you are having problems. It's also a good idea to know your test results and keep a list of the medicines you take.  How can you care for yourself at home?  Think about getting vaccinated to help prevent hepatitis A, hepatitis B, and human papillomavirus (HPV). They can be spread through sex.  Use a condom every time you have sex. Use an external condom, which goes on the penis. Or use an internal condom, which goes into the vagina or anus.  Make sure you use the right size external condom. A condom that's too small can break easily. A condom that's too big can slip off during sex.  Use a new condom each time you have sex. Be careful not to poke a hole in the condom when you open the wrapper.  Don't use an internal condom and an external condom at the same time.  Never use petroleum jelly (such as Vaseline), grease, hand lotion, baby oil, or anything with oil in it. These products can make holes in the condom.  After intercourse, hold the edge of the condom as you remove it. This will help keep semen from spilling out of the condom.  Do not have sex with anyone who has symptoms of an STI, such as sores on the genitals or mouth.  Do not drink a lot of alcohol or use drugs before sex.  Limit your sex partners. Sex with one partner who has sex  "only with you can reduce your risk of getting an STI.  Don't share sex toys. But if you do share them, use a condom and clean the sex toys between each use.  Talk to any partners before you have sex. Talk about what you feel comfortable with and whether you have any boundaries with sex. And find out if your partner or partners may be at risk for any STI. Keep in mind that a person may be able to spread an STI even if they do not have symptoms. You and any partners may want to get tested for STIs.  Where can you learn more?  Go to https://www.[employer].net/patiented  Enter B608 in the search box to learn more about \"Safer Sex: Care Instructions.\"  Current as of: November 27, 2023               Content Version: 14.0    4043-4323 [employer].   Care instructions adapted under license by your healthcare professional. If you have questions about a medical condition or this instruction, always ask your healthcare professional. Healthwise, [employer] disclaims any warranty or liability for your use of this information.         "

## 2024-07-19 ASSESSMENT — ANXIETY QUESTIONNAIRES: GAD7 TOTAL SCORE: 16

## 2024-07-24 ASSESSMENT — PATIENT HEALTH QUESTIONNAIRE - PHQ9
SUM OF ALL RESPONSES TO PHQ QUESTIONS 1-9: 6
SUM OF ALL RESPONSES TO PHQ QUESTIONS 1-9: 6
10. IF YOU CHECKED OFF ANY PROBLEMS, HOW DIFFICULT HAVE THESE PROBLEMS MADE IT FOR YOU TO DO YOUR WORK, TAKE CARE OF THINGS AT HOME, OR GET ALONG WITH OTHER PEOPLE: VERY DIFFICULT

## 2024-07-25 ENCOUNTER — OFFICE VISIT (OUTPATIENT)
Dept: PSYCHOLOGY | Facility: OTHER | Age: 64
End: 2024-07-25
Attending: SOCIAL WORKER
Payer: COMMERCIAL

## 2024-07-25 DIAGNOSIS — R45.4 IRRITABILITY: ICD-10-CM

## 2024-07-25 DIAGNOSIS — F33.0 MAJOR DEPRESSIVE DISORDER, RECURRENT EPISODE, MILD (H): ICD-10-CM

## 2024-07-25 DIAGNOSIS — F41.9 ANXIETY: ICD-10-CM

## 2024-07-25 DIAGNOSIS — F39 MOOD DISORDER (H): ICD-10-CM

## 2024-07-25 DIAGNOSIS — F41.1 GAD (GENERALIZED ANXIETY DISORDER): ICD-10-CM

## 2024-07-25 DIAGNOSIS — F43.9 STRESS AT HOME: ICD-10-CM

## 2024-07-25 PROCEDURE — 90834 PSYTX W PT 45 MINUTES: CPT | Performed by: SOCIAL WORKER

## 2024-07-25 ASSESSMENT — COLUMBIA-SUICIDE SEVERITY RATING SCALE - C-SSRS
2. HAVE YOU ACTUALLY HAD ANY THOUGHTS OF KILLING YOURSELF?: NO
TOTAL  NUMBER OF INTERRUPTED ATTEMPTS LIFETIME: NO
6. HAVE YOU EVER DONE ANYTHING, STARTED TO DO ANYTHING, OR PREPARED TO DO ANYTHING TO END YOUR LIFE?: NO
TOTAL  NUMBER OF ABORTED OR SELF INTERRUPTED ATTEMPTS LIFETIME: NO
ATTEMPT LIFETIME: NO
1. HAVE YOU WISHED YOU WERE DEAD OR WISHED YOU COULD GO TO SLEEP AND NOT WAKE UP?: NO

## 2024-07-25 NOTE — PROGRESS NOTES
[facility]   Mental Health & Addiction Services     Progress Note - Initial Visit    Patient  Name:  Adin Madrid Date: 2024           Service Type: Individual     Visit Start Time: 1400  Visit End Time: 1450    Visit #:  1    Attendees: Client    Service Modality:  In-person       DATA:   Interactive Complexity: No   Crisis: No     Presenting Concerns/  Current Stressors:   From [place], [place], works for [employer], 33 years. Changed his position, having indecision  , irritable, anxiety, perfectionism trait. Had a role with [employer] program, then switched [employer], noted some challenging people to work with now. Back pain issues. After graduation, [place] on farm[place].  15 years, no kids. Single living on his own. 1 dog, Will. Likes shooting, hunting, gun smithing. Cars. Some loss of interest.     [employer], care giver for parents, living, mother on hospice. Sense humor. Hard-workers, beef farm. Strict, firm. [place]. All sisters; 3 older 1 younger. The live out of state. Second to oldest past away; suicide, depression for her. Remembers her, her kids are around. Was in 30's when she . History of significant medical, heart, near death situation, required life saving interventions.     Went back to college and did well. Started working for [employer], with [employer] science. Does not want to be there further at work. Not right.     Quit drinking in 90's, on his own. Addictive personality. No smoking.     Called Dr. Ruano, air lifted 2023. Feeling better, takes medications as prescribed. Parents at the [facility]. Stressed with their age. Sister's helping out.     1928.     Divorce , afterwards realizing it's over. Emotional hurt. [place] decision. Death of sister. Lost dog Naye, years, ex-wives dog, had her put down, regrets, 5 years ago,  chihuahua. , care giving. Living in home he used to  share with wife.     1981 graduation from high school. Quiet  in HS. [place], [place].     Would like to get back to enjoy working, no longer fun.     History and hobbies: Long range shooting, shiny car, work let people do it. Efficiency is big. 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 Sold bulls     ASSESSMENT:  Mental Status Assessment:  Appearance:   Appropriate   Eye Contact:   Good   Psychomotor Behavior: Normal   Attitude:   Cooperative  Attentive  Orientation:   All  Speech   Rate / Production: Normal/ Responsive Emotional Talkative   Volume:  Normal   Mood:    Anxious  Depressed  Normal  Affect:    Appropriate  Tearful  Thought Content:  Clear   Thought Form:  Coherent   Insight:    Good     Assessments completed prior to this visit:  The following assessments were completed by patient for this visit:  PHQ9:       3/1/2022     2:52 PM 3/23/2023    11:40 AM 4/3/2023     9:37 AM 4/13/2023    11:00 AM 9/19/2023     2:51 PM 7/18/2024     8:52 AM 7/24/2024     3:28 PM   PHQ-9 SCORE   PHQ-9 Total Score MyChart 8 (Mild depression) 5 (Mild depression) 14 (Moderate depression) 9 (Mild depression) 7 (Mild depression) 7 (Mild depression) 6 (Mild depression)   PHQ-9 Total Score 8 5 14 9 7 7 6     GAD7:       2/1/2018    10:00 AM 10/23/2019     8:34 AM 3/1/2022     2:54 PM 3/27/2023     3:45 PM 9/13/2023    12:06 PM 7/15/2024    12:58 PM 7/18/2024     8:50 AM   MODESTA-7 SCORE   Total Score   8 (mild anxiety) 7 (mild anxiety) 7 (mild anxiety) 15 (severe anxiety) 16 (severe anxiety)   Total Score 17 11 8 7 7 15 16     PROMIS 10-Global Health (only subscores and total score):       7/24/2024     4:00 PM   PROMIS-10 Scores Only   Global Mental Health Score 8   Global Physical Health Score 12   PROMIS TOTAL - SUBSCORES 20         Safety Issues and Plan for Safety and Risk Management:   [place] Suicide Severity Rating Scale (Lifetime/Recent)      7/25/2024     1:00 PM   [place] Suicide Severity Rating (Lifetime/Recent)   Q1  Wish to be Dead (Lifetime) N   Q2 Non-Specific Active Suicidal Thoughts (Lifetime) N   Actual Attempt (Lifetime) N   Has subject engaged in non-suicidal self-injurious behavior? (Lifetime) N   Interrupted Attempts (Lifetime) N   Aborted or Self-Interrupted Attempt (Lifetime) N   Preparatory Acts or Behavior (Lifetime) N   Calculated C-SSRS Risk Score (Lifetime/Recent) No Risk Indicated     Patient denies current fears or concerns for personal safety.  Patient denies current or recent suicidal ideation or behaviors.  Patient denies current or recent homicidal ideation or behaviors.  Patient denies current or recent self injurious behavior or ideation.  Patient denies other safety concerns.  Recommended that patient call 911 or go to the local ED should there be a change in any of these risk factors.  Patient reports there are firearms in the house. The firearms are secured in a locked space.     Diagnostic Criteria:  Major Depressive Disorder  A) Single episode - symptoms have been present during the same 2-week period and represent a change from previous functioning 5 or more symptoms (required for diagnosis)   - Depressed mood. Note: In children and adolescents, can be irritable mood.     - Diminished interest or pleasure in all, or almost all, activities.    - Increased sleep.    - Psychomotor activity agitation.    - Fatigue or loss of energy.    - Feelings of worthlessness or inappropriate and excessive guilt.    - Diminished ability to think or concentrate, or indecisiveness.   B) The symptoms cause clinically significant distress or impairment in social, occupational, or other important areas of functioning  C) The episode is not attributable to the physiological effects of a substance or to another medical condition  D) The occurence of major depressive episode is not better explained by other thought / psychotic disorders  E) There has never been a manic episode or hypomanic episode      DSM5 Diagnoses:  (Sustained by DSM5 Criteria Listed Above)  Diagnoses: 296.21 (F32.0) Major Depressive Disorder, Single Episode, Mild _ and With anxious distress  Psychosocial & Contextual Factors: Lives alone with dog, past difficult marriage and divorce, difficult death of sister, hard working, transition difficulties noted with job and medical trauma.   Intervention:   Gathered initial life situation, needs, some history and intake.  Collateral Reports Completed:  Not Applicable      PLAN: (Homework, other):  1. Provider will continue Diagnostic Assessment.  Patient was given the following to do until next session: Maintain healthy daily routine, consider time-off work, note life adjustments he's having to face and history of stress/trauma in his life.     2. Provider recommended the following referrals: n/a at this time.      3.  Suicide Risk and Safety Concerns were assessed for Adin Madrid.    Patient meets the following risk assessment and triage: Patient denied any current/recent/lifetime history of suicidal ideation and/or behaviors.  No safety plan indicated at this time.       Victor Manuel Villarreal, [facility]  July 25, 2024

## 2024-08-26 ENCOUNTER — OFFICE VISIT (OUTPATIENT)
Dept: FAMILY MEDICINE | Facility: OTHER | Age: 64
End: 2024-08-26
Attending: CHIROPRACTOR
Payer: COMMERCIAL

## 2024-08-26 VITALS
OXYGEN SATURATION: 97 % | HEIGHT: 71 IN | RESPIRATION RATE: 16 BRPM | BODY MASS INDEX: 28 KG/M2 | HEART RATE: 62 BPM | SYSTOLIC BLOOD PRESSURE: 108 MMHG | DIASTOLIC BLOOD PRESSURE: 69 MMHG | WEIGHT: 200 LBS

## 2024-08-26 DIAGNOSIS — G89.29 CHRONIC NECK PAIN: Primary | ICD-10-CM

## 2024-08-26 DIAGNOSIS — M99.04 SEGMENTAL DYSFUNCTION OF SACRAL REGION: ICD-10-CM

## 2024-08-26 DIAGNOSIS — M99.01 SEGMENTAL DYSFUNCTION OF CERVICAL REGION: ICD-10-CM

## 2024-08-26 DIAGNOSIS — M54.2 CHRONIC NECK PAIN: Primary | ICD-10-CM

## 2024-08-26 DIAGNOSIS — M99.02 SEGMENTAL DYSFUNCTION OF THORACIC REGION: ICD-10-CM

## 2024-08-26 PROCEDURE — 99215 OFFICE O/P EST HI 40 MIN: CPT | Performed by: CHIROPRACTOR

## 2024-08-26 ASSESSMENT — PATIENT HEALTH QUESTIONNAIRE - PHQ9
10. IF YOU CHECKED OFF ANY PROBLEMS, HOW DIFFICULT HAVE THESE PROBLEMS MADE IT FOR YOU TO DO YOUR WORK, TAKE CARE OF THINGS AT HOME, OR GET ALONG WITH OTHER PEOPLE: VERY DIFFICULT
SUM OF ALL RESPONSES TO PHQ QUESTIONS 1-9: 5
SUM OF ALL RESPONSES TO PHQ QUESTIONS 1-9: 5

## 2024-08-26 ASSESSMENT — PAIN SCALES - GENERAL: PAINLEVEL: SEVERE PAIN (6)

## 2024-09-02 ASSESSMENT — ANXIETY QUESTIONNAIRES
GAD7 TOTAL SCORE: 12
8. IF YOU CHECKED OFF ANY PROBLEMS, HOW DIFFICULT HAVE THESE MADE IT FOR YOU TO DO YOUR WORK, TAKE CARE OF THINGS AT HOME, OR GET ALONG WITH OTHER PEOPLE?: VERY DIFFICULT
GAD7 TOTAL SCORE: 12
GAD7 TOTAL SCORE: 12
7. FEELING AFRAID AS IF SOMETHING AWFUL MIGHT HAPPEN: SEVERAL DAYS

## 2024-09-03 ENCOUNTER — OFFICE VISIT (OUTPATIENT)
Dept: PEDIATRICS | Facility: OTHER | Age: 64
End: 2024-09-03
Attending: INTERNAL MEDICINE
Payer: COMMERCIAL

## 2024-09-03 VITALS
RESPIRATION RATE: 16 BRPM | HEART RATE: 66 BPM | DIASTOLIC BLOOD PRESSURE: 62 MMHG | SYSTOLIC BLOOD PRESSURE: 94 MMHG | WEIGHT: 199.8 LBS | BODY MASS INDEX: 27.97 KG/M2 | HEIGHT: 71 IN | TEMPERATURE: 98.3 F

## 2024-09-03 DIAGNOSIS — H93.13 TINNITUS, BILATERAL: ICD-10-CM

## 2024-09-03 DIAGNOSIS — F41.9 ANXIETY: ICD-10-CM

## 2024-09-03 DIAGNOSIS — F39 MOOD DISORDER (H): ICD-10-CM

## 2024-09-03 DIAGNOSIS — F43.9 STRESS AT HOME: ICD-10-CM

## 2024-09-03 DIAGNOSIS — H91.90 HEARING LOSS, UNSPECIFIED HEARING LOSS TYPE, UNSPECIFIED LATERALITY: Primary | ICD-10-CM

## 2024-09-03 DIAGNOSIS — F41.1 GAD (GENERALIZED ANXIETY DISORDER): ICD-10-CM

## 2024-09-03 DIAGNOSIS — R45.4 IRRITABILITY: ICD-10-CM

## 2024-09-03 DIAGNOSIS — F33.0 MAJOR DEPRESSIVE DISORDER, RECURRENT EPISODE, MILD (H): ICD-10-CM

## 2024-09-03 PROCEDURE — 99214 OFFICE O/P EST MOD 30 MIN: CPT | Performed by: INTERNAL MEDICINE

## 2024-09-03 PROCEDURE — G2211 COMPLEX E/M VISIT ADD ON: HCPCS | Performed by: INTERNAL MEDICINE

## 2024-09-03 ASSESSMENT — PAIN SCALES - GENERAL
PAINLEVEL: SEVERE PAIN (6)
PAINLEVEL: SEVERE PAIN (6)

## 2024-09-03 NOTE — NURSING NOTE
"Chief Complaint   Patient presents with    Anxiety    Depression and anxiety follow up medication.      Initial BP 94/62   Pulse 66   Temp 98.3  F (36.8  C) (Tympanic)   Resp 16   Ht 1.791 m (5' 10.5\")   Wt 90.6 kg (199 lb 12.8 oz)   BMI 28.26 kg/m   Estimated body mass index is 28.26 kg/m  as calculated from the following:    Height as of this encounter: 1.791 m (5' 10.5\").    Weight as of this encounter: 90.6 kg (199 lb 12.8 oz).  Medication Review: complete    The next two questions are to help us understand your food security.  If you are feeling you need any assistance in this area, we have resources available to support you today.          7/18/2024   SDOH- Food Insecurity   Within the past 12 months, did you worry that your food would run out before you got money to buy more? N   Within the past 12 months, did the food you bought just not last and you didn t have money to get more? N            Health Care Directive:  Patient does not have a Health Care Directive or Living Will: Discussed advance care planning with patient; however, patient declined at this time.    Norma J. Gosselin, LPN      "

## 2024-09-03 NOTE — PROGRESS NOTES
"Assessment & Plan   1. Hearing loss, unspecified hearing loss type, unspecified laterality  2. Tinnitus, bilateral  Recommend an audiology referral with Naina Alva    3. Major depressive disorder, recurrent episode, mild (H24)  4. Irritability  5. Stress at home  6. Mood disorder (H24)  7. MODESTA (generalized anxiety disorder)  8. Anxiety  We again had a lengthy discussion today with regards to mood disorder.  He is not having any adverse effect from the fluoxetine and may be finding some benefit.  We discussed options and decided to increase the dose.  I encouraged him to continue seeing his therapist.  Crisis resources provided.  Close follow-up recommended  - FLUoxetine (PROZAC) 20 MG capsule; Take 1 capsule (20 mg) by mouth daily.  Dispense: 90 capsule; Refill: 4      Patient Instructions    -- Continue with therapy   -- Continue bupropion   -- Increase fluoxetine to 20 mg    Crisis Resources for Mental Health    Local:   -- dial 211: First Call for Help (https://[employer].net/)    National:   -- dial or txt 988 (https://BigRoad/)   -- txt HOME to 490336 to connect with a crisis counselor. (https://www.crisistextline.org/)    More resources:  American Psychological Association: https://www.apa.org/topics/crisis-hotlines        Return in about 2 months (around 11/3/2024), or if symptoms worsen or fail to improve, for mood.    Signed, Adin Ruano MD, FAAP, FACP  Internal Medicine & Pediatrics    Subjective   Adinrobinson Madrid is a 64 year old male who presents for Anxiety and Depression.  He also believes he needs an audiology referral as he is experiencing hearing loss and tinnitus.  He feels an adequate, unsettled, lack of motivation and no interest in activities    Objective   Vitals: BP 94/62   Pulse 66   Temp 98.3  F (36.8  C) (Tympanic)   Resp 16   Ht 1.791 m (5' 10.5\")   Wt 90.6 kg (199 lb 12.8 oz)   BMI 28.26 kg/m          "

## 2024-09-03 NOTE — PATIENT INSTRUCTIONS
-- Continue with therapy   -- Continue bupropion   -- Increase fluoxetine to 20 mg    Crisis Resources for Mental Health    Local:   -- dial 211: First Call for Help (https://xpitmhzxs229.net/)    National:   -- dial or txt 988 (https://[employer]line.org/)   -- txt HOME to 926531 to connect with a crisis counselor. (https://www.crisistextline.org/)    More resources:  American Psychological Association: https://www.apa.org/topics/crisis-hotlines

## 2024-09-05 ENCOUNTER — THERAPY VISIT (OUTPATIENT)
Dept: CHIROPRACTIC MEDICINE | Facility: OTHER | Age: 64
End: 2024-09-05
Attending: CHIROPRACTOR
Payer: COMMERCIAL

## 2024-09-05 VITALS
RESPIRATION RATE: 16 BRPM | SYSTOLIC BLOOD PRESSURE: 108 MMHG | TEMPERATURE: 98.2 F | OXYGEN SATURATION: 96 % | DIASTOLIC BLOOD PRESSURE: 70 MMHG

## 2024-09-05 DIAGNOSIS — M99.02 SEGMENTAL DYSFUNCTION OF THORACIC REGION: ICD-10-CM

## 2024-09-05 DIAGNOSIS — M54.2 CHRONIC NECK PAIN: Primary | ICD-10-CM

## 2024-09-05 DIAGNOSIS — M99.04 SEGMENTAL DYSFUNCTION OF SACRAL REGION: ICD-10-CM

## 2024-09-05 DIAGNOSIS — G89.29 CHRONIC NECK PAIN: Primary | ICD-10-CM

## 2024-09-05 DIAGNOSIS — M99.01 SEGMENTAL DYSFUNCTION OF CERVICAL REGION: ICD-10-CM

## 2024-09-05 PROCEDURE — 99212 OFFICE O/P EST SF 10 MIN: CPT | Mod: 25 | Performed by: CHIROPRACTOR

## 2024-09-05 PROCEDURE — 98941 CHIROPRACT MANJ 3-4 REGIONS: CPT | Mod: AT | Performed by: CHIROPRACTOR

## 2024-09-05 PROCEDURE — 97012 MECHANICAL TRACTION THERAPY: CPT | Performed by: CHIROPRACTOR

## 2024-09-05 NOTE — PROGRESS NOTES
Bilateral neck is intermittently sore with pain rated 7/10 W24 8/10. Pain radiates into the left arm causing numbness. Patient uses hot and cold packs to provide temporary pain relief.     Upper medial back is intermittently stabbing with pain rated 4/10 W24 8/10. Uses hot and cold packs as well as stretches to provide temporary pain relief.     Cale Coppola CNA on 9/5/2024 at 2:55 PM     Reviewed by EW    Visit #:  1/10    Subjective:  Adin Madrid is a 64 year old male who is seen in f/u up for:        Chronic neck pain  Segmental dysfunction of cervical region  Segmental dysfunction of thoracic region  Segmental dysfunction of sacral region.     Since last visit on 5/29/2024,  Adin Madrid reports: Neck and back concerns have continued to be present.  Patient now reports that he has been experiencing radicular symptoms into the left arm.  Denies any red flags or weaknesses of the upper and/or lower extremities.  No changes in bowel bladder habits.    Was recently assessed by primary provider and referred to spine therapy  Dr. Don GASPAR director of occupational medicine at [facility].  Dr. Ochoa provided specific listings for spinal somatic dysfunction which she wishes to be addressed in addition to spinal traction.    (DVPRS) Pain Rating Score : 4-Distracts me, can do usual activities (W24 8/10) (09/05/24 1452)     Objective:  The following was observed:  /70 (BP Location: Right arm)   Temp 98.2  F (36.8  C) (Tympanic)   Resp 16   SpO2 96%        9/5/2024     2:58 PM   Neck Disability Index (  Ahmet H. and Varun C. 1991. All rights reserved.; used with permission)   SECTION 1 - PAIN INTENSITY 3   SECTION 2 - PERSONAL CARE 2   SECTION 3 - LIFTING 2   SECTION 4 - READING 2   SECTION 5 - HEADACHES 0   SECTION 6 - CONCENTRATION 1   SECTION 7 - WORK 2   SECTION 8 - DRIVING 2   SECTION 9 - SLEEPING 1   SECTION 10 - RECREATION 3   Count 10   Sum 18   Raw Score: /50 18   Neck  Disability Index Score: (%) 36 %      Cervical AROM: Restrictions are noted with lateral flexion and rotation, pain noted with flexion by patient    Cervical distraction: +    Oswestry (JOSELYN) Questionnaire        9/5/2024     2:56 PM   OSWESTRY DISABILITY INDEX   Count 9   Sum 24   Oswestry Score (%) 53.33 %       Thoracic/lumbar AROM: generally unremarkable, slight restriction with left rotation    Slumps: negative bilaterally    Kenneth STarT back:     P: palpatory tenderness C5 on left, T7:    A: static palpation demonstrates intersegmental asymmetry , cervical, thoracic, lumbar, pelvis  R: motion palpation notes restricted motion, C5 , T7 , L5 , and Sacrum   T: Spasms are noted intrascapular T-spine paraspinals, rhomboids bilaterally    Segmental spinal dysfunction/restrictions found at:  :  C5 Left rotation restricted, Left lateral flexion restricted, and Extension restriction  T7 Left lateral flexion restricted and Extension restriction  Sacrum Right lateral flexion restricted and Extension restriction.      Assessment: Chronic concerns.  Patient has been under our care extensively with chiropractic treatments, acupuncture.  These results have been positive according to the patient but do seem to be temporary.  It was recommended that we begin at least 10 sessions of care.  Physical therapy course of treatment will begin after about 4 weeks.  Patient's work schedule is quite hectic and follow-up care can be difficult to plan.  We will read Dr. Woods, chiropractor with [facility], and on patient's case if and when patient needs to be seen by him if we are unavailable.    Treatment plan to include traction therapy specifically for the cervical spine, chiropractic adjustments, consider acupuncture for patient as this has been beneficial in the past.    Diagnoses:      1. Chronic neck pain    2. Segmental dysfunction of cervical region    3. Segmental dysfunction of thoracic region    4. Segmental dysfunction of  sacral region        Patient's condition:  Patient had restrictions pre-manipulation    Treatment effectiveness:  Post manipulation there is better intersegmental movement, patient notes increased numbness and tingling of the left arm post chiropractic adjustments and traction therapy      Procedures:  E/M 58199    CMT:  13333 Chiropractic manipulative treatment 3-4 regions performed   Cervical: Diversified, C5 , Supine  Thoracic: Diversified, T7, Prone  Pelvis: Diversified, Sacrum , Side posture    Modalities:  69885: Mechanical traction, cervical spine, 8/3, intermittent pull, 16 minutes  Patient did tolerate this poundage.  Patient did feel pulling sensation in between the shoulder blades as well as slight increase of numbness and tingling in the left arm    Therapeutic procedures:  None  Deferred to physical therapy    Response to Treatment  Increased numbness and tingling of the left arm, tolerated treatment well    Prognosis: Good    Progress towards Goals: Decrease neck and back pain symptoms by at least 25% within 4 weeks prior to beginning physical therapy  Improve pain-free ranges of motion  Improve disability index scores by at least 10% within 4 weeks     Recommendations:    Instructions: Monitor symptoms closely    Follow-up:  Return to care in 5 days.

## 2024-09-06 ENCOUNTER — MYC MEDICAL ADVICE (OUTPATIENT)
Dept: PEDIATRICS | Facility: OTHER | Age: 64
End: 2024-09-06
Payer: COMMERCIAL

## 2024-10-01 ENCOUNTER — THERAPY VISIT (OUTPATIENT)
Dept: PHYSICAL THERAPY | Facility: OTHER | Age: 64
End: 2024-10-01
Attending: CHIROPRACTOR
Payer: COMMERCIAL

## 2024-10-01 DIAGNOSIS — M99.02 SOMATIC DYSFUNCTION OF THORACIC REGION: ICD-10-CM

## 2024-10-01 DIAGNOSIS — M99.04 SOMATIC DYSFUNCTION OF SACRAL REGION: ICD-10-CM

## 2024-10-01 DIAGNOSIS — M54.2 CHRONIC NECK PAIN: Primary | ICD-10-CM

## 2024-10-01 DIAGNOSIS — G89.29 CHRONIC NECK PAIN: Primary | ICD-10-CM

## 2024-10-01 DIAGNOSIS — M99.01 SOMATIC DYSFUNCTION OF CERVICAL REGION: ICD-10-CM

## 2024-10-01 PROCEDURE — 97161 PT EVAL LOW COMPLEX 20 MIN: CPT | Mod: GP,PO

## 2024-10-01 PROCEDURE — 97140 MANUAL THERAPY 1/> REGIONS: CPT | Mod: GP,PO

## 2024-10-01 NOTE — PROGRESS NOTES
PHYSICAL THERAPY EVALUATION  Type of Visit: Evaluation        Fall Risk Screen:  Fall screen completed by: PT  Have you fallen 2 or more times in the past year?: No  Have you fallen and had an injury in the past year?: No  Is patient a fall risk?: No    Subjective       Presenting condition or subjective complaint: Patient is a 64 year old male referred to physical therapy, specifically the spine therapy program, for neck, mid, and low back pain. Patient reports that more of his pain is consistently in the cervical spine with radicular symptoms down his left upper extremity. Patient notes that it primarily sticks to his palm and second third and fourth digits. This will typically get worse the harder he works. Does have some low back pain but he has been dealing with this for quite some time and is not as severe as the cervical spine. Has been through physical therapy and chiropractic in the past. Feels that his best relief comes from traction. Due to busy nature of work and upcoming plans, patient notes that he can be difficult at times to get into treatment. Patient realizes that a lot of his discomfort comes from how hard he works at home and at work.  Date of onset: 08/26/24    Relevant medical history: Depression; Hearing problems; Heart problems; Neck injury   Dates & types of surgery:  See chart    Prior diagnostic imaging/testing results: X-ray     Prior therapy history for the same diagnosis, illness or injury: Yes Would have to check my records    Prior Level of Function  Transfers: Independent  Ambulation: Independent  ADL: Independent    Living Environment  Social support: Alone   Type of home: House   Stairs to enter the home: Yes 5 Is there a railing: Yes     Ramp: No   Stairs inside the home: Yes 10 Is there a railing: Yes     Help at home: None  Equipment owned:   None    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  Hobbies/Interests: Hot rods and hunting    Patient goals for therapy: be more  active    Pain assessment: Location: Neck/low back /Rating: severe     Objective   LUMBAR SPINE EVALUATION  PAIN: Pain Quality: Aching, Dull, Sharp, and Shooting  Pain Frequency: constant  Pain is Exacerbated By: lifting, reaching, turning head repeatedly   Pain is Relieved By: otc medications and stretch  INTEGUMENTARY (edema, incisions): WNL  POSTURE: Protracted shoulders, forward head position  GAIT:   Weightbearing Status: WBAT  Assistive Device(s): None  Gait Deviations: WNL  BALANCE/PROPRIOCEPTION: WNL  WEIGHTBEARING ALIGNMENT: WNL  NON-WEIGHTBEARING ALIGNMENT: WNL   ROM:   (Degrees) Left AROM Left PROM  Right AROM Right PROM   Hip Flexion  100 degrees without increase in pain  100 degrees without increase in pain   Hip Internal Rotation  Min limited  Min limited   Hip External Rotation  Min limited  Min limited   Knee Flexion WFL  WFL    Knee Extension WFL  WFL    Lumbar Rotation Min limited Min limited   Lumbar Flexion    Lumbar Extension WFL with mild discomfort     PELVIC/SI SCREEN: WFL  STRENGTH: WNL  MYOTOMES: WNL  DERMATOMES: WNL  NEURAL TENSION:  mild tension noted with sciatic tensioning  LUMBAR/HIP Special Tests: Did not assess on this date due to time.   PELVIS/SI SPECIAL TESTS: WNL  PALPATION: Discomfort noted in lumbar paraspinals and gluteus medius with palpation on this date    CERVICAL SPINE EVALUATION  POSTURE: See above  GAIT:   Weightbearing Status: WBAT  Assistive Device(s): None  Gait Deviations: WNL  BALANCE/PROPRIOCEPTION: WNL  WEIGHTBEARING ALIGNMENT: WNL  ROM:   (Degrees) Left AROM Right AROM    Cervical Flexion WFL with pulling in posterior neck    Cervical Extension 5 degrees before onset of pain    Cervical Side bend 15 degrees 15 degrees    Cervical Rotation 40 degrees 55 degrees     Left AROM Left PROM Right AROM Right PROM   Shoulder Flexion WFL  WFL    Shoulder Extension WFL  WFL    Shoulder Abduction WFL  WFL    Shoulder IR Able to reach back of belt line  Able to reach back of  belt line    Shoulder ER Able to reach back of head  Able to reach back of head      MYOTOMES: WNL  DERMATOMES:  Positive findings on median nerve distribution  NEURAL TENSION: Positive median nerve tensioning  FLEXIBILITY:  Overall stiffness secondary to pain    SPECIAL TESTS:    Left Right   Compression Positive Positive   Distraction Negative  Negative      PALPATION:  Discomfort noted with palpation to cervical paraspinals, suboccipital muscles, rhomboids, upper trap/levator scap and pec muscles. More tension noted on left when compared to right  SPINAL SEGMENTAL CONCLUSIONS: Stiffness noted with central PA glides to lower cervical spine C4-C7    Assessment & Plan   CLINICAL IMPRESSIONS  Medical Diagnosis: Chronic neck pain (M54.2, G89.29), Segmental dysfunction of cervical region (M99.01)    Segmental dysfunction of thoracic region (M99.02)    Segmental dysfunction of sacral region (M99.04)    Treatment Diagnosis: Impaired mobility, decreased strength and endurance, neck pain with radicular symptoms, chronic back pain   Impression/Assessment: Patient is a 64 year old male with neck, mid back, thoracic complaints.  The following significant findings have been identified: Pain, Decreased ROM/flexibility, Decreased joint mobility, Decreased strength, Decreased proprioception, Impaired sensation, Impaired gait, Impaired muscle performance, Decreased activity tolerance, and Impaired posture. These impairments interfere with their ability to perform self care tasks, work tasks, recreational activities, household chores, driving , household mobility, and community mobility as compared to previous level of function.     Clinical Decision Making (Complexity):  Clinical Presentation: Stable/Uncomplicated  Clinical Presentation Rationale: based on medical and personal factors listed in PT evaluation  Clinical Decision Making (Complexity): Low complexity    PLAN OF CARE  Treatment Interventions:  Modalities: Cryotherapy,  E-stim, Hot Pack, Mechanical Traction, Ultrasound, Vasoneumatic Device  Interventions: Gait Training, Manual Therapy, Neuromuscular Re-education, Therapeutic Activity, Therapeutic Exercise, Aquatic Therapy    Long Term Goals     PT Goal 1  Goal Identifier: Ambulation  Goal Description: patient will be able to ambulate on even/uneven ground for up to 30 minutes with no increase in low back pain in order to improve his overall function at work and mobility in the community  Rationale: to maximize safety and independence within the community  Target Date: 11/27/24  PT Goal 2  Goal Identifier: Work  Goal Description: 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 with no increase in neck pain or radicular symptoms down his left upper extremity in order to improve his overall function at work  Rationale: to maximize safety and independence within the community  Target Date: 11/27/24  PT Goal 3  Goal Identifier: Lifting  Goal Description: Patient will with a lift/carry up to 50 pounds with no increase in low back, neck, and arm pain in order to improve his overall function at home and at work  Rationale: to maximize safety and independence within the home  Target Date: 11/27/24  PT Goal 4  Goal Identifier: Housework  Goal Description: Patient will be able to complete all indoor and outdoor housework, such as dishes, laundry, lawn care, and snow removal, with no increase in low back, neck, or arm pain in order to improve his overall function at home  Rationale: to maximize safety and independence within the home  Target Date: 11/27/24      Frequency of Treatment: 1-2 times per week  Duration of Treatment: 8 weeks  Education Assessment:   Learner/Method: Patient;No Barriers to Learning    Risks and benefits of evaluation/treatment have been explained.   Patient/Family/caregiver agrees with Plan of Care.     Evaluation Time:     PT Eval, Low Complexity Minutes (99363): 25       Signing  Clinician: Harman Jorge PT

## 2024-10-03 ENCOUNTER — THERAPY VISIT (OUTPATIENT)
Dept: PHYSICAL THERAPY | Facility: OTHER | Age: 64
End: 2024-10-03
Attending: CHIROPRACTOR
Payer: COMMERCIAL

## 2024-10-03 DIAGNOSIS — M99.04 SOMATIC DYSFUNCTION OF SACRAL REGION: ICD-10-CM

## 2024-10-03 DIAGNOSIS — M54.2 CHRONIC NECK PAIN: Primary | ICD-10-CM

## 2024-10-03 DIAGNOSIS — M99.01 SOMATIC DYSFUNCTION OF CERVICAL REGION: ICD-10-CM

## 2024-10-03 DIAGNOSIS — G89.29 CHRONIC NECK PAIN: Primary | ICD-10-CM

## 2024-10-03 DIAGNOSIS — M99.02 SOMATIC DYSFUNCTION OF THORACIC REGION: ICD-10-CM

## 2024-10-03 PROCEDURE — 97012 MECHANICAL TRACTION THERAPY: CPT | Mod: GP,PO

## 2024-10-03 PROCEDURE — 97110 THERAPEUTIC EXERCISES: CPT | Mod: GP,PO

## 2024-10-08 ENCOUNTER — THERAPY VISIT (OUTPATIENT)
Dept: PHYSICAL THERAPY | Facility: OTHER | Age: 64
End: 2024-10-08
Attending: CHIROPRACTOR
Payer: COMMERCIAL

## 2024-10-08 DIAGNOSIS — M99.04 SOMATIC DYSFUNCTION OF SACRAL REGION: ICD-10-CM

## 2024-10-08 DIAGNOSIS — M54.2 CHRONIC NECK PAIN: Primary | ICD-10-CM

## 2024-10-08 DIAGNOSIS — M99.01 SOMATIC DYSFUNCTION OF CERVICAL REGION: ICD-10-CM

## 2024-10-08 DIAGNOSIS — M99.02 SOMATIC DYSFUNCTION OF THORACIC REGION: ICD-10-CM

## 2024-10-08 DIAGNOSIS — G89.29 CHRONIC NECK PAIN: Primary | ICD-10-CM

## 2024-10-08 PROCEDURE — 97140 MANUAL THERAPY 1/> REGIONS: CPT | Mod: GP,PO

## 2024-10-08 PROCEDURE — 97012 MECHANICAL TRACTION THERAPY: CPT | Mod: GP,PO,XU

## 2024-10-08 PROCEDURE — 97110 THERAPEUTIC EXERCISES: CPT | Mod: GP,PO

## 2024-10-10 ENCOUNTER — THERAPY VISIT (OUTPATIENT)
Dept: PHYSICAL THERAPY | Facility: OTHER | Age: 64
End: 2024-10-10
Attending: CHIROPRACTOR
Payer: COMMERCIAL

## 2024-10-10 DIAGNOSIS — M54.2 CHRONIC NECK PAIN: Primary | ICD-10-CM

## 2024-10-10 DIAGNOSIS — M99.04 SOMATIC DYSFUNCTION OF SACRAL REGION: ICD-10-CM

## 2024-10-10 DIAGNOSIS — G89.29 CHRONIC NECK PAIN: Primary | ICD-10-CM

## 2024-10-10 DIAGNOSIS — M99.02 SOMATIC DYSFUNCTION OF THORACIC REGION: ICD-10-CM

## 2024-10-10 DIAGNOSIS — M99.01 SOMATIC DYSFUNCTION OF CERVICAL REGION: ICD-10-CM

## 2024-10-10 PROCEDURE — 97012 MECHANICAL TRACTION THERAPY: CPT | Mod: GP,PO,XU

## 2024-10-10 PROCEDURE — 97110 THERAPEUTIC EXERCISES: CPT | Mod: GP,PO

## 2024-10-10 PROCEDURE — 97140 MANUAL THERAPY 1/> REGIONS: CPT | Mod: GP,PO

## 2024-10-15 ENCOUNTER — THERAPY VISIT (OUTPATIENT)
Dept: PHYSICAL THERAPY | Facility: OTHER | Age: 64
End: 2024-10-15
Attending: CHIROPRACTOR
Payer: COMMERCIAL

## 2024-10-15 DIAGNOSIS — M99.01 SOMATIC DYSFUNCTION OF CERVICAL REGION: ICD-10-CM

## 2024-10-15 DIAGNOSIS — M99.02 SOMATIC DYSFUNCTION OF THORACIC REGION: ICD-10-CM

## 2024-10-15 DIAGNOSIS — G89.29 CHRONIC NECK PAIN: Primary | ICD-10-CM

## 2024-10-15 DIAGNOSIS — M54.2 CHRONIC NECK PAIN: Primary | ICD-10-CM

## 2024-10-15 DIAGNOSIS — M99.04 SOMATIC DYSFUNCTION OF SACRAL REGION: ICD-10-CM

## 2024-10-15 PROCEDURE — 97140 MANUAL THERAPY 1/> REGIONS: CPT | Mod: GP,PO

## 2024-10-15 PROCEDURE — 97012 MECHANICAL TRACTION THERAPY: CPT | Mod: GP,PO,XU

## 2024-10-15 PROCEDURE — 97110 THERAPEUTIC EXERCISES: CPT | Mod: GP,PO

## 2024-10-22 ENCOUNTER — THERAPY VISIT (OUTPATIENT)
Dept: PHYSICAL THERAPY | Facility: OTHER | Age: 64
End: 2024-10-22
Attending: CHIROPRACTOR
Payer: COMMERCIAL

## 2024-10-22 DIAGNOSIS — M99.01 SOMATIC DYSFUNCTION OF CERVICAL REGION: ICD-10-CM

## 2024-10-22 DIAGNOSIS — M99.04 SOMATIC DYSFUNCTION OF SACRAL REGION: ICD-10-CM

## 2024-10-22 DIAGNOSIS — G89.29 CHRONIC NECK PAIN: Primary | ICD-10-CM

## 2024-10-22 DIAGNOSIS — M99.02 SOMATIC DYSFUNCTION OF THORACIC REGION: ICD-10-CM

## 2024-10-22 DIAGNOSIS — M54.2 CHRONIC NECK PAIN: Primary | ICD-10-CM

## 2024-10-22 PROCEDURE — 97110 THERAPEUTIC EXERCISES: CPT | Mod: GP,PO

## 2024-10-22 PROCEDURE — 97140 MANUAL THERAPY 1/> REGIONS: CPT | Mod: GP,PO

## 2024-10-22 PROCEDURE — 97012 MECHANICAL TRACTION THERAPY: CPT | Mod: GP,PO

## 2024-10-24 ENCOUNTER — THERAPY VISIT (OUTPATIENT)
Dept: PHYSICAL THERAPY | Facility: OTHER | Age: 64
End: 2024-10-24
Attending: CHIROPRACTOR
Payer: COMMERCIAL

## 2024-10-24 DIAGNOSIS — M99.02 SOMATIC DYSFUNCTION OF THORACIC REGION: ICD-10-CM

## 2024-10-24 DIAGNOSIS — M99.04 SOMATIC DYSFUNCTION OF SACRAL REGION: ICD-10-CM

## 2024-10-24 DIAGNOSIS — G89.29 CHRONIC NECK PAIN: Primary | ICD-10-CM

## 2024-10-24 DIAGNOSIS — M99.01 SOMATIC DYSFUNCTION OF CERVICAL REGION: ICD-10-CM

## 2024-10-24 DIAGNOSIS — M54.2 CHRONIC NECK PAIN: Primary | ICD-10-CM

## 2024-10-24 PROCEDURE — 97110 THERAPEUTIC EXERCISES: CPT | Mod: GP,PO

## 2024-10-24 PROCEDURE — 97140 MANUAL THERAPY 1/> REGIONS: CPT | Mod: GP,PO

## 2024-10-24 PROCEDURE — 97012 MECHANICAL TRACTION THERAPY: CPT | Mod: GP,PO

## 2024-10-29 ENCOUNTER — THERAPY VISIT (OUTPATIENT)
Dept: PHYSICAL THERAPY | Facility: OTHER | Age: 64
End: 2024-10-29
Attending: CHIROPRACTOR
Payer: COMMERCIAL

## 2024-10-29 DIAGNOSIS — M99.04 SOMATIC DYSFUNCTION OF SACRAL REGION: ICD-10-CM

## 2024-10-29 DIAGNOSIS — G89.29 CHRONIC NECK PAIN: Primary | ICD-10-CM

## 2024-10-29 DIAGNOSIS — M99.02 SOMATIC DYSFUNCTION OF THORACIC REGION: ICD-10-CM

## 2024-10-29 DIAGNOSIS — M54.2 CHRONIC NECK PAIN: Primary | ICD-10-CM

## 2024-10-29 DIAGNOSIS — M99.01 SOMATIC DYSFUNCTION OF CERVICAL REGION: ICD-10-CM

## 2024-10-29 PROCEDURE — 97012 MECHANICAL TRACTION THERAPY: CPT | Mod: GP,PO,XU

## 2024-10-29 PROCEDURE — 97140 MANUAL THERAPY 1/> REGIONS: CPT | Mod: GP,PO

## 2024-10-31 ENCOUNTER — OFFICE VISIT (OUTPATIENT)
Dept: FAMILY MEDICINE | Facility: OTHER | Age: 64
End: 2024-10-31
Attending: CHIROPRACTOR
Payer: COMMERCIAL

## 2024-10-31 VITALS
DIASTOLIC BLOOD PRESSURE: 77 MMHG | SYSTOLIC BLOOD PRESSURE: 124 MMHG | OXYGEN SATURATION: 98 % | HEART RATE: 58 BPM | WEIGHT: 200.2 LBS | HEIGHT: 71 IN | RESPIRATION RATE: 16 BRPM | BODY MASS INDEX: 28.03 KG/M2 | TEMPERATURE: 97 F

## 2024-10-31 DIAGNOSIS — G89.29 CHRONIC BILATERAL LOW BACK PAIN WITH LEFT-SIDED SCIATICA: ICD-10-CM

## 2024-10-31 DIAGNOSIS — M54.2 CHRONIC NECK PAIN: Primary | ICD-10-CM

## 2024-10-31 DIAGNOSIS — G89.29 CHRONIC NECK PAIN: Primary | ICD-10-CM

## 2024-10-31 DIAGNOSIS — M54.42 CHRONIC BILATERAL LOW BACK PAIN WITH LEFT-SIDED SCIATICA: ICD-10-CM

## 2024-10-31 DIAGNOSIS — M54.12 CERVICAL RADICULOPATHY: ICD-10-CM

## 2024-10-31 PROCEDURE — 99214 OFFICE O/P EST MOD 30 MIN: CPT | Performed by: CHIROPRACTOR

## 2024-10-31 ASSESSMENT — PAIN SCALES - GENERAL: PAINLEVEL_OUTOF10: SEVERE PAIN (6)

## 2024-10-31 NOTE — PROGRESS NOTES
CHIEF COMPLAINT:   Chief Complaint   Patient presents with    Follow Up     4 week spine program       HISTORY OF PRESENTING INJURY     Adin reports no significant changes, noting 6/10 pain, in both cervical spine and lumbar spine.  There continues to be left leg radiculopathy and tingling in his right hand.  He has undergone a series of traction treatments with some short term benefits.    Oswestry (JOSELYN) Questionnaire        10/31/2024    10:12 AM   OSWESTRY DISABILITY INDEX   Count 10    Sum 19    Oswestry Score (%) 38 %        Patient-reported          PAST MEDICAL HISTORY:  Past Medical History:   Diagnosis Date    Encounter for general adult medical examination without abnormal findings     9/20/04,Satisfactory    Gastro-esophageal reflux disease without esophagitis     resolved    It band syndrome, right 10/16/2023    Metatarsalgia     resolved.    Strain of muscle and tendon of unspecified wall of thorax, initial encounter     No Comments Provided       PAST SURGICAL HISTORY:  Past Surgical History:   Procedure Laterality Date    COLONOSCOPY  01/08/2014 1/2014,Melanosis coli - normal; follow up 10 years    VASECTOMY      01/06       ALLERGIES:  Allergies   Allergen Reactions    Paxil [Paroxetine] Other (See Comments)     ED       CURRENT MEDICATIONS:  Current Outpatient Medications   Medication Sig Dispense Refill    acetaminophen (TYLENOL) 325 MG tablet Take 650 mg by mouth      blood glucose (NO BRAND SPECIFIED) test strip Use to test blood sugar 3 times daily or as directed. To accompany: Blood Glucose Monitor Brands: per insurance. 100 strip 6    blood glucose calibration (NO BRAND SPECIFIED) solution To accompany: Blood Glucose Monitor Brands: per insurance. 1 each 3    blood glucose monitoring (NO BRAND SPECIFIED) meter device kit Use to test blood sugar 3 times daily or as directed. Preferred blood glucose meter OR supplies to accompany: Blood Glucose Monitor Brands: per insurance. 1 kit 0     "buPROPion (WELLBUTRIN XL) 150 MG 24 hr tablet Take 1 tablet (150 mg) by mouth every morning 90 tablet 4    diphenhydrAMINE-acetaminophen (TYLENOL PM)  MG tablet Take 0.5 tablets by mouth nightly as needed for sleep      ELIQUIS ANTICOAGULANT 5 MG tablet Take 1 tablet (5 mg) by mouth 2 times daily 180 tablet 4    FLUoxetine (PROZAC) 20 MG capsule Take 1 capsule (20 mg) by mouth daily. 90 capsule 4    JARDIANCE 10 MG TABS tablet Take 1 tablet (10 mg) by mouth daily 90 tablet 4    lisinopril (ZESTRIL) 2.5 MG tablet Take 1 tablet (2.5 mg) by mouth daily 90 tablet 4    metFORMIN (GLUCOPHAGE XR) 500 MG 24 hr tablet Take 1 tablet (500 mg) by mouth daily (with dinner) 90 tablet 4    metoprolol succinate ER (TOPROL XL) 25 MG 24 hr tablet Take 1 tablet (25 mg) by mouth daily 90 tablet 4    rosuvastatin (CRESTOR) 5 MG tablet Take 1 tablet (5 mg) by mouth daily 90 tablet 4    thin (NO BRAND SPECIFIED) lancets Use with lanceting device. To accompany: Blood Glucose Monitor Brands: per insurance. 100 each 6       SOCIAL HISTORY:  Unremarkable    FAMILY HISTORY:  Family History   Problem Relation Age of Onset    Diabetes Mother         Diabetes    Other - See Comments Mother         early dementia    Hyperlipidemia Father         Hyperlipidemia,High cholesterol    Other - See Comments Father         Stroke    Colon Cancer No family hx of     Prostate Cancer No family hx of        REVIEW OF SYSTEMS:    Unremarkable      PHYSICAL EXAM:   /77 (BP Location: Right arm, Patient Position: Sitting, Cuff Size: Adult Large)   Pulse 58   Temp 97  F (36.1  C) (Temporal)   Resp 16   Ht 1.791 m (5' 10.5\")   Wt 90.8 kg (200 lb 3.2 oz)   SpO2 98%   BMI 28.32 kg/m   Body mass index is 28.32 kg/m . General Appearance: No acute distress.      IMPRESSION/PLAN:    We are going to proceed with MR imaging of the cervical and lumbar spine based on chronic findings, radiculopathy, and no significant gains from conservative treatment. " Stop physical therapy at this time and revisit with me in one week to go over imaging findings and management options.       Total time spent today in chart review/preparation, face to face evaluation, and documentation: 38 minutes.        Jake Ochoa DC, LUCILLE, CICE  Director - Occupational Medicine Department  Diplomate of the American Board of Forensic Professionals  Board Certified - American Board of Independent Medical Examiners    1:52 PM 10/31/2024

## 2024-11-05 ASSESSMENT — ANXIETY QUESTIONNAIRES
GAD7 TOTAL SCORE: 9
3. WORRYING TOO MUCH ABOUT DIFFERENT THINGS: SEVERAL DAYS
GAD7 TOTAL SCORE: 9
1. FEELING NERVOUS, ANXIOUS, OR ON EDGE: MORE THAN HALF THE DAYS
5. BEING SO RESTLESS THAT IT IS HARD TO SIT STILL: SEVERAL DAYS
GAD7 TOTAL SCORE: 9
6. BECOMING EASILY ANNOYED OR IRRITABLE: SEVERAL DAYS
IF YOU CHECKED OFF ANY PROBLEMS ON THIS QUESTIONNAIRE, HOW DIFFICULT HAVE THESE PROBLEMS MADE IT FOR YOU TO DO YOUR WORK, TAKE CARE OF THINGS AT HOME, OR GET ALONG WITH OTHER PEOPLE: VERY DIFFICULT
8. IF YOU CHECKED OFF ANY PROBLEMS, HOW DIFFICULT HAVE THESE MADE IT FOR YOU TO DO YOUR WORK, TAKE CARE OF THINGS AT HOME, OR GET ALONG WITH OTHER PEOPLE?: VERY DIFFICULT
4. TROUBLE RELAXING: SEVERAL DAYS
2. NOT BEING ABLE TO STOP OR CONTROL WORRYING: MORE THAN HALF THE DAYS
7. FEELING AFRAID AS IF SOMETHING AWFUL MIGHT HAPPEN: SEVERAL DAYS
7. FEELING AFRAID AS IF SOMETHING AWFUL MIGHT HAPPEN: SEVERAL DAYS

## 2024-11-06 ENCOUNTER — OFFICE VISIT (OUTPATIENT)
Dept: PSYCHOLOGY | Facility: OTHER | Age: 64
End: 2024-11-06
Attending: SOCIAL WORKER
Payer: COMMERCIAL

## 2024-11-06 DIAGNOSIS — F33.0 MAJOR DEPRESSIVE DISORDER, RECURRENT EPISODE, MILD (H): ICD-10-CM

## 2024-11-06 DIAGNOSIS — F41.1 GAD (GENERALIZED ANXIETY DISORDER): Primary | ICD-10-CM

## 2024-11-06 PROCEDURE — 90834 PSYTX W PT 45 MINUTES: CPT | Performed by: SOCIAL WORKER

## 2024-11-06 ASSESSMENT — COLUMBIA-SUICIDE SEVERITY RATING SCALE - C-SSRS
6. HAVE YOU EVER DONE ANYTHING, STARTED TO DO ANYTHING, OR PREPARED TO DO ANYTHING TO END YOUR LIFE?: NO
1. HAVE YOU WISHED YOU WERE DEAD OR WISHED YOU COULD GO TO SLEEP AND NOT WAKE UP?: NO
TOTAL  NUMBER OF ABORTED OR SELF INTERRUPTED ATTEMPTS LIFETIME: NO
TOTAL  NUMBER OF INTERRUPTED ATTEMPTS LIFETIME: NO
ATTEMPT LIFETIME: NO
2. HAVE YOU ACTUALLY HAD ANY THOUGHTS OF KILLING YOURSELF?: NO

## 2024-11-06 NOTE — PROGRESS NOTES
[facility]   Mental Health & Addiction Services     Progress Note     Patient  Name:  Adin Madrid  Date: 11/6/2024    Service Type:  Individual     Visit Start Time: 1600   Visit End Time: 1650    Visit #:   2    Attendees:  Client    Service Modality:   In-person       DATA:   Interactive Complexity: No   Crisis: No     Presenting Concerns/  Current Stressors:    This session worked on diagnostic assessment with family and life history. Also started psycho-education with EMDR. Adin shared family history while growing-up and beyond. Noted psycho-physiological  activation with medical beeping sounds when he brought father into ER recently. History with anxiety and medical trauma in life described/shared.     ASSESSMENT:  Mental Status Assessment:  Appearance:   Appropriate   Eye Contact:   Good   Psychomotor Behavior: Normal   Attitude:   Cooperative  Attentive  Orientation:   All  Speech   Rate / Production: Normal/ Responsive   Volume:  Normal   Mood:    Anxious  Normal  Affect:    Appropriate   Thought Content:  Clear   Thought Form:  Coherent   Insight:    Good     Assessments completed prior to this visit:  The following assessments were completed by patient for this visit:  PHQ9:       4/13/2023    11:00 AM 9/19/2023     2:51 PM 7/18/2024     8:52 AM 7/24/2024     3:28 PM 8/26/2024    10:50 AM 9/2/2024     5:56 PM 11/5/2024     1:08 PM   PHQ-9 SCORE   PHQ-9 Total Score MyChart 9 (Mild depression) 7 (Mild depression) 7 (Mild depression) 6 (Mild depression) 5 (Mild depression) 5 (Mild depression) 7 (Mild depression)   PHQ-9 Total Score 9 7 7 6 5 5 7        Patient-reported     GAD7:       3/1/2022     2:54 PM 3/27/2023     3:45 PM 9/13/2023    12:06 PM 7/15/2024    12:58 PM 7/18/2024     8:50 AM 9/2/2024     5:57 PM 11/5/2024     1:12 PM   MODESTA-7 SCORE   Total Score 8 (mild anxiety) 7 (mild anxiety) 7 (mild anxiety) 15 (severe anxiety) 16 (severe anxiety) 12 (moderate anxiety) 9 (mild  anxiety)   Total Score 8 7 7 15 16 12 9        Patient-reported     PROMIS 10-Global Health (only subscores and total score):       7/24/2024     4:00 PM 11/5/2024     1:15 PM   PROMIS-10 Scores Only   Global Mental Health Score 8 9    Global Physical Health Score 12 11    PROMIS TOTAL - SUBSCORES 20 20        Patient-reported         Safety Issues and Plan for Safety and Risk Management:   Emmons Suicide Severity Rating Scale (Lifetime/Recent)      7/25/2024     1:00 PM   Emmons Suicide Severity Rating (Lifetime/Recent)   Q1 Wish to be Dead (Lifetime) N   Q2 Non-Specific Active Suicidal Thoughts (Lifetime) N   Actual Attempt (Lifetime) N   Has subject engaged in non-suicidal self-injurious behavior? (Lifetime) N   Interrupted Attempts (Lifetime) N   Aborted or Self-Interrupted Attempt (Lifetime) N   Preparatory Acts or Behavior (Lifetime) N   Calculated C-SSRS Risk Score (Lifetime/Recent) No Risk Indicated     Patient denies current fears or concerns for personal safety.  Patient denies current or recent suicidal ideation or behaviors.  Patient denies current or recent homicidal ideation or behaviors.  Patient denies current or recent self injurious behavior or ideation.  Patient denies other safety concerns.  Recommended that patient call 911 or go to the local ED should there be a change in any of these risk factors.  Patient reports there are firearms in the house. The firearms are secured in a locked space.     Diagnostic Criteria:  Major Depressive Disorder  A) Single episode - symptoms have been present during the same 2-week period and represent a change from previous functioning 5 or more symptoms (required for diagnosis)   - Depressed mood. Note: In children and adolescents, can be irritable mood.     - Diminished interest or pleasure in all, or almost all, activities.    - Increased sleep.    - Psychomotor activity agitation.    - Fatigue or loss of energy.    - Feelings of worthlessness or  inappropriate and excessive guilt.    - Diminished ability to think or concentrate, or indecisiveness.   B) The symptoms cause clinically significant distress or impairment in social, occupational, or other important areas of functioning  C) The episode is not attributable to the physiological effects of a substance or to another medical condition  D) The occurence of major depressive episode is not better explained by other thought / psychotic disorders  E) There has never been a manic episode or hypomanic episode      DSM5 Diagnoses: (Sustained by DSM5 Criteria Listed Above)  Diagnoses: 296.21 (F32.0) Major Depressive Disorder, Single Episode, Mild _ and With anxious distress  Psychosocial & Contextual Factors: Lives alone with dog, past difficult marriage and divorce, difficult death of sister, hard working, transition difficulties noted with job and medical trauma.   Intervention:   History taking, EMDR Phase 1 as well. Diagnostic assessment work.  Collateral Reports Completed:  Not Applicable      PLAN: (Homework, other):  1. Provider will complete the Diagnostic Assessment.  Patient was given the following to do until next session: Maintain healthy daily routine, tune into physiological/pain messages from body, consider time-off work, note life adjustments he's having to face and history of stress/trauma in his life.     2. Provider recommended the following referrals: n/a at this time.      3.  Suicide Risk and Safety Concerns were assessed for Adin Madrid.    Patient meets the following risk assessment and triage: Patient denied any current/recent/lifetime history of suicidal ideation and/or behaviors.  No safety plan indicated at this time.       Victor Manuel Villarreal, [facility]  11/6/2024

## 2024-11-08 ENCOUNTER — HOSPITAL ENCOUNTER (OUTPATIENT)
Dept: MRI IMAGING | Facility: OTHER | Age: 64
Discharge: HOME | End: 2024-11-08
Attending: CHIROPRACTOR
Payer: COMMERCIAL

## 2024-11-08 DIAGNOSIS — M54.42 CHRONIC BILATERAL LOW BACK PAIN WITH LEFT-SIDED SCIATICA: ICD-10-CM

## 2024-11-08 DIAGNOSIS — M54.2 CHRONIC NECK PAIN: ICD-10-CM

## 2024-11-08 DIAGNOSIS — M54.12 CERVICAL RADICULOPATHY: ICD-10-CM

## 2024-11-08 DIAGNOSIS — G89.29 CHRONIC BILATERAL LOW BACK PAIN WITH LEFT-SIDED SCIATICA: ICD-10-CM

## 2024-11-08 DIAGNOSIS — G89.29 CHRONIC NECK PAIN: ICD-10-CM

## 2024-11-08 PROCEDURE — 72141 MRI NECK SPINE W/O DYE: CPT

## 2024-11-08 PROCEDURE — 72148 MRI LUMBAR SPINE W/O DYE: CPT

## 2024-11-11 DIAGNOSIS — G89.29 CHRONIC NECK PAIN: Primary | ICD-10-CM

## 2024-11-11 DIAGNOSIS — M54.42 CHRONIC BILATERAL LOW BACK PAIN WITH LEFT-SIDED SCIATICA: ICD-10-CM

## 2024-11-11 DIAGNOSIS — M54.12 CERVICAL RADICULOPATHY: ICD-10-CM

## 2024-11-11 DIAGNOSIS — G89.29 CHRONIC BILATERAL LOW BACK PAIN WITH LEFT-SIDED SCIATICA: ICD-10-CM

## 2024-11-11 DIAGNOSIS — M54.2 CHRONIC NECK PAIN: Primary | ICD-10-CM

## 2024-11-24 ENCOUNTER — MYC MEDICAL ADVICE (OUTPATIENT)
Dept: FAMILY MEDICINE | Facility: OTHER | Age: 64
End: 2024-11-24
Payer: COMMERCIAL

## 2024-11-25 ENCOUNTER — OFFICE VISIT (OUTPATIENT)
Dept: PSYCHOLOGY | Facility: OTHER | Age: 64
End: 2024-11-25
Attending: SOCIAL WORKER
Payer: COMMERCIAL

## 2024-11-25 DIAGNOSIS — F39 MOOD DISORDER (H): ICD-10-CM

## 2024-11-25 DIAGNOSIS — F41.1 GAD (GENERALIZED ANXIETY DISORDER): Primary | ICD-10-CM

## 2024-11-25 DIAGNOSIS — M54.42 CHRONIC BILATERAL LOW BACK PAIN WITH LEFT-SIDED SCIATICA: Primary | ICD-10-CM

## 2024-11-25 DIAGNOSIS — F33.0 MAJOR DEPRESSIVE DISORDER, RECURRENT EPISODE, MILD (H): ICD-10-CM

## 2024-11-25 DIAGNOSIS — G89.29 CHRONIC BILATERAL LOW BACK PAIN WITH LEFT-SIDED SCIATICA: Primary | ICD-10-CM

## 2024-11-25 NOTE — PROGRESS NOTES
"[facility]         PATIENT'S NAME: Adin Madrid  PREFERRED NAME: Adin  PRONOUNS: he/him  MRN: [id number]  : 1960  ADDRESS: [address] [address] [address]  ACCT. NUMBER:  [id number]  DATE OF SERVICE: 24  START TIME: 1300  END TIME: 1350  PREFERRED PHONE: [phone]  May we leave a program related message: Yes  EMERGENCY CONTACT: was obtained for Duane, father.  SERVICE MODALITY:  In-person    [place] ADULT Mental Health DIAGNOSTIC ASSESSMENT    Identifying Information: Patient (Adin) is a 64 year-old,  male new to this clinician. Patient was referred for an assessment by self and primary care clinic. Patient attended the session alone.    Chief Complaint: The reason for seeking services at this time is: \"Something is not right  anxiety, struggle making decisions\". Shared feeling a lack of interest, low motivation and excessive sleeping. Adin has been caring for his elder parents in an assisted living home, going through a lot of changes, needing full time care more, the situation is emotional for him, needing more care than possible for him. Reported feeling a down mood more often. Mother is struggling with dementia and both parents are now wheelchair bound. Adin's sleep has been impacted, lower and lack of energy, outlook more negative, interest drop. Overwhelmed feeling was described. Has to work very hard to adapt with the changes and challenges at his work. The problem(s) began around 26. Patient has attempted to resolve these concerns in the past through medication consult and intervention.    Social/Family History: Adin and his family are from [place], [place]. 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 Adin is having a changed in his usual position with the [employer]. Shared he's having issues with indecision, irritable, anxiety and has perfectionism traits. Described how he " had a specific  role with [employer] program, then switched [employer], noted some challenging people to work with now. Adin also shared impairing and chronic back pain issues.     Adin attended public school in [place], [place] while growing up. School work was not easy, learning was more difficult at the time. Learning issues, complicated by stigma he felt. Adin noted having a sense humor that's been less and less lately. Adin grew-up with a hard-working family, they rand a beef farm and his father was also a full time . Described his family of origin as: strict, firm and loving. Adin had all sisters; 3 older and 1 younger. Sisters: Anne, Paige (), Shahrzad, Adin and Shruthi. Good relationships with siblings, Shahrzad has been helpful with consulting on parents needs. Anne and Shruthi live out of state. Second to oldest sister, Paige  by suicide and had a history of depression in her 30's.     [place] graduating [place] [employer] [place] a  for 10 years. Adin was  for 15 years,  and no kids. Currently is single living on his own has 1 dog, Will. Likes shooting, hunting, gun smithing. Working on classic car.     Other Data: Divorce was in 2010. Noted emotionally hard at the time of divorce at it was a mutual decision. Death and suicide of sister was a difficult time in Adin's life. Lost his dog Naye, needed euthanized. Adin is living in the home he used to share with his wife Lore. Described himself as a workaholic as part of his personality.  graduation from high school. Reported he was quiet and more reserved  in high school. Adin has a 1965 blue Ku,  [place], he used to enjoy working on. Would like to get back to enjoy working his job. Adin was raised by both biological parents. Parents were always and remain living together. Patient described their current relationships with family of  origin as mostly good and currently stressful with parent's health decline.     The patient describes their cultural background as , farming family, rural, education was valued as well as hard work.  Cultural influences and impact on patient's life structure, values, norms, and healthcare: noted stigma felt with mental health, asking for help and when not able to work to his expectation levels. Contextual influences on patient's health include: Contextual Factors: Individual Factors with a recent health crisis and intervention for cardiac issue and Family Factors with caring for elder parents. These factors will be addressed in the Preliminary Treatment plan. Patient identified their preferred language to be English. Patient reported they do not need the assistance of an  or other support involved in therapy.     Patient reported experienced some delays in developmental tasks, such as learning, dyslexia, math, avoided, awkwardness within learning environments. Patient's highest education level was associate degree / vocational certificate.  Patient identified the following learning problems: concentration and reading. Modifications will not be used to assist communication in therapy. Patient reports they are able to understand written materials.    Patient reported the following relationship history one prior marriage, no children. Patient's current relationship status is has a significant other via dating relationship. Patient identified their sexual orientation as heterosexual.  Patient reported having no children. Patient identified pets; friends as part of their support system. Patient identified the quality of these relationships as good. Patient's current living/housing situation involves staying in his own home/apartment. The immediate members of family and household include Will, 14, dog. Housing is stable. Patient is currently employed full time and plans to retire in spring time 2026.  Patient reports their finances are obtained through employment. Patient does not identify finances as a current stressor. Patient reported that they have not been involved with the legal system. Patient does not report being under probation/ parole/ jurisdiction.     Patient's Strengths and Limitations: Patient identified the following strengths or resources that will help them succeed in treatment: commitment to health and well being, family support, insight, intelligence, motivation, sense of humor, strong social skills, and work ethic. Things that may interfere with the patient's success in treatment include: few friends, physical health concerns, and time and work issues.     Assessments:  PHQ9:       4/13/2023    11:00 AM 9/19/2023     2:51 PM 7/18/2024     8:52 AM 7/24/2024     3:28 PM 8/26/2024    10:50 AM 9/2/2024     5:56 PM 11/5/2024     1:08 PM   PHQ-9 SCORE   PHQ-9 Total Score MyChart 9 (Mild depression) 7 (Mild depression) 7 (Mild depression) 6 (Mild depression) 5 (Mild depression) 5 (Mild depression) 7 (Mild depression)   PHQ-9 Total Score 9 7 7 6 5 5 7        Patient-reported     GAD7:       3/1/2022     2:54 PM 3/27/2023     3:45 PM 9/13/2023    12:06 PM 7/15/2024    12:58 PM 7/18/2024     8:50 AM 9/2/2024     5:57 PM 11/5/2024     1:12 PM   MODESTA-7 SCORE   Total Score 8 (mild anxiety) 7 (mild anxiety) 7 (mild anxiety) 15 (severe anxiety) 16 (severe anxiety) 12 (moderate anxiety) 9 (mild anxiety)   Total Score 8 7 7 15 16 12 9        Patient-reported     CAGE-AID:       7/24/2024     4:01 PM   CAGE-AID Total Score   Total Score 0   Total Score MyChart 0 (A total score of 2 or greater is considered clinically significant)     PROMIS 10-Global Health (only subscores and total score):       7/24/2024     4:00 PM 11/5/2024     1:15 PM   PROMIS-10 Scores Only   Global Mental Health Score 8 9    Global Physical Health Score 12 11    PROMIS TOTAL - SUBSCORES 20 20        Patient-reported     [place] Suicide  Severity Rating Scale (Lifetime/Recent)      7/25/2024     1:00 PM 11/6/2024     3:00 PM   [place] Suicide Severity Rating (Lifetime/Recent)   Q1 Wish to be Dead (Lifetime) N N   Q2 Non-Specific Active Suicidal Thoughts (Lifetime) N N   Actual Attempt (Lifetime) N N   Has subject engaged in non-suicidal self-injurious behavior? (Lifetime) N N   Interrupted Attempts (Lifetime) N N   Aborted or Self-Interrupted Attempt (Lifetime) N N   Preparatory Acts or Behavior (Lifetime) N N   Calculated C-SSRS Risk Score (Lifetime/Recent) No Risk Indicated No Risk Indicated       Personal and Family Medical History: Called Dr. Ruano, air lifted April 2023. Feeling better, takes medications as prescribed. Eye opener, spotty memories, changed life style, , got realized how brief and quick life can go by. [place]'s. Monitor beep trigger. DBT skills   Parents at the [employer]. Stressed with their age. Sister's helping out. Patient does report a family history of mental health concerns with sisters suicide. Patient reports family history includes Diabetes in his mother; Hyperlipidemia in his father; Other - See Comments in his father and mother.    Patient does report Mental Health Diagnosis and/or Treatment. Patient reported the following previous diagnoses which include(s): Major Depression, Recurrent, Mild. MODESTA and Mood Disorder. Patient reported symptoms began in 2023. Patient has not received mental health services in the past; other than medication through PCP.  Psychiatric Hospitalizations: None. Patient denies a history of civil commitment. Currently, patient is receiving other mental health services with medication management. These include primary care provider at [facility].       Patient has had a physical exam to rule out medical causes for current symptoms.  Date of last physical exam was within the past year. Client was encouraged to follow up with PCP if symptoms were to develop. The patient has a [facility]  Provider, who is named Adin Ruano. Patient reports the following current medical concerns: cardiac condition with past AFIB . Patient reports pain concerns including : back pain.  Patient's pain provider is Dr. Ruano.  There are not significant appetite / nutritional concerns / weight changes. Patient does report a history of head injury / trauma / cognitive impairment; with  April 2023 cardiac emergency.     Patient reports current meds as:   Current Outpatient Medications   Medication Sig Dispense Refill    acetaminophen (TYLENOL) 325 MG tablet Take 650 mg by mouth      blood glucose (NO BRAND SPECIFIED) test strip Use to test blood sugar 3 times daily or as directed. To accompany: Blood Glucose Monitor Brands: per insurance. 100 strip 6    blood glucose calibration (NO BRAND SPECIFIED) solution To accompany: Blood Glucose Monitor Brands: per insurance. 1 each 3    blood glucose monitoring (NO BRAND SPECIFIED) meter device kit Use to test blood sugar 3 times daily or as directed. Preferred blood glucose meter OR supplies to accompany: Blood Glucose Monitor Brands: per insurance. 1 kit 0    buPROPion (WELLBUTRIN XL) 150 MG 24 hr tablet Take 1 tablet (150 mg) by mouth every morning 90 tablet 4    diphenhydrAMINE-acetaminophen (TYLENOL PM)  MG tablet Take 0.5 tablets by mouth nightly as needed for sleep      ELIQUIS ANTICOAGULANT 5 MG tablet Take 1 tablet (5 mg) by mouth 2 times daily 180 tablet 4    FLUoxetine (PROZAC) 20 MG capsule Take 1 capsule (20 mg) by mouth daily. 90 capsule 4    JARDIANCE 10 MG TABS tablet Take 1 tablet (10 mg) by mouth daily 90 tablet 4    lisinopril (ZESTRIL) 2.5 MG tablet Take 1 tablet (2.5 mg) by mouth daily 90 tablet 4    metFORMIN (GLUCOPHAGE XR) 500 MG 24 hr tablet Take 1 tablet (500 mg) by mouth daily (with dinner) 90 tablet 4    metoprolol succinate ER (TOPROL XL) 25 MG 24 hr tablet Take 1 tablet (25 mg) by mouth daily 90 tablet 4    rosuvastatin (CRESTOR) 5 MG  tablet Take 1 tablet (5 mg) by mouth daily 90 tablet 4    thin (NO BRAND SPECIFIED) lancets Use with lanceting device. To accompany: Blood Glucose Monitor Brands: per insurance. 100 each 6     No current facility-administered medications for this visit.       Medication Adherence:  Patient reports taking.  taking psychiatric medications as prescribed.    Patient Allergies:    Allergies   Allergen Reactions    Paxil [Paroxetine] Other (See Comments)     ED       Medical History:    Past Medical History:   Diagnosis Date    Encounter for general adult medical examination without abnormal findings     9/20/04,Satisfactory    Gastro-esophageal reflux disease without esophagitis     resolved    It band syndrome, right 10/16/2023    Metatarsalgia     resolved.    Strain of muscle and tendon of unspecified wall of thorax, initial encounter     No Comments Provided         Current Mental Status Exam:   Appearance:  Appropriate    Eye Contact:  Good   Psychomotor:  Normal  Restless       Gait / station:  Slow with back pain  Attitude / Demeanor: Cooperative  Interested Friendly Attentive  Speech      Rate / Production: Normal/ Responsive      Volume:  Normal  volume      Language:  intact, no problems, and good  Mood:   Anxious  Depressed  Elevated  Irritable  Normal  Affect:   Appropriate  Worrisome    Thought Content: Clear  Rumination   Thought Process: Coherent       Associations: No loosening of associations  Insight:   Good   Judgment:  Intact   Orientation:  All  Attention/concentration: Good    Back pain. Needs to take care of back. Had AFIB, worried about return.     Struggles physically and mentally with job changes; has decided to retire in April 2026.     Substance Use:   Patient did not report a family history of substance use concerns; see medical history section for details.  Patient has not received chemical dependency treatment in the past.  Patient has not ever been to detox.      Patient is not currently  "receiving any chemical dependency treatment. Patient reported the following problems as a result of their substance use:  relationship problems.    Patient reports using alcohol during the 1980's and 90's Patient first started drinking at age 20.  Patient reported date of last use was in the mid-1990's. Quit drinking in 90's, on his own. \"Addictive personality\" trait was shared. Caffeine 3 times per day and drinks 1 at a time. Patient started using caffeine at age 20.    Substance Use: No symptoms at this time.   Based on the CAGE score of 0 and clinical interview there  are not indications of drug or alcohol abuse.    Significant Losses / Trauma / Abuse / Neglect Issues:  Patient did not did not serve in the . There are indications or report of significant loss, trauma, abuse or neglect issues related to: death of sister, loss of marriage relationship, major changes in employment environment, major medical problems, and divorce / relational changes, with parents in ailing health.    Safety Assessment:   Patient denies current homicidal ideation and behaviors.  Patient denies current self-injurious ideation and behaviors.    Patient denied risk behaviors associated with substance use.   Patient denies any high risk behaviors associated with mental health symptoms.  Patient reports the following current concerns for their personal safety: None.  Patient reports there are firearms in the house; yes, they are secured.    History of Safety Concerns:  Patient denied a history of homicidal ideation.     Patient denied a history of personal safety concerns.    Patient denied a history of assaultive behaviors.    Patient denied a history of sexual assault behaviors.     Patient denied a history of risk behaviors associated with substance use.  Patient denies any history of high risk behaviors associated with mental health symptoms.  Patient reports the following protective factors: hopeful, identifies reason for " living, access to and engagement with healthcare, current engagement in treatment and/or motivation to establish therapeutic relationship, strong bond to family unit, community, job, school, etc, supportive social network or family, and responsibilities to others forward or future oriented thinking; dedication to family or friends; safe and stable environment; regular sleep; regular physical activity; sense of belonging; purpose; abstinence from substances; adherence with prescribed medication    Vulnerability Assessment:    Does the patient have a history of vulnerability such as being teased, picked on, or other indications of potential safety issues with others ?  No    Does this patient have a history of being the victim of abuse? No history of abuse reported or documented.    Does this patient have a history of victimizing others or physical/sexual aggression? No     Does the patient have a history of boundary violations?  No.    Does the patient have a history of other sexual acting out behaviors (e.g grooming)?   No    Does the patient have a history of threats to self or others? Fire setting, running away or other self-injurious behaviors?    No    Has the patient required holds or restraints to manage behavior?  No    Does the patient s history indicate the need for special precautions or particular staffing patterns in the facility?  No    Risk Plan:  See Recommendations for Safety and Risk Management Plan    Review of Symptoms per patient report: Does not feel productive or in place at work any longer. Lack of feeling productive. Starts to gets projects together, order supplies. Co-workers. Under-staffed. In different projects. Learning Pro-actively. Boss's personality is to wing it, not a planner.   Depression: Lack of interest or pleasure in doing things, Feeling sad, down, or depressed, Feelings of hopelessness, Change in sleep, Low self-worth, Difficulties concentrating, Psychomotor slowing or  agitation, Ruminations, Irritability, and Withdrawn anger episodes intense.   Lakshmi:  Irritability, Racing thoughts, Increased activity, Pressured speech, Restlessness, Distractibility, and Impulsiveness.   Psychosis: No Symptoms  Anxiety: Excessive worry, Nervousness, Social anxiety, Fears/phobias with personal health and parents health, Sleep disturbance, Psychomotor agitation, Ruminations, Poor concentration, and Irritability.   Panic:  Panic symptoms include the following and occur weekly and last approximately 10-30 minutes, pacing, sense of impending doom, Triggers, and uncertainty, indecisive feeling - not his usual baseline.   Post Traumatic Stress Disorder:  Experienced traumatic event parents care, and job stress, sister's death, divorce time, Avoids traumatic stimuli, Hypervigilance, Increased arousal, Impaired functioning, Nightmares.   Eating Disorder: No Symptoms  ADD / ADHD:  Inattentive, Poor task completion, Poor organizational skills, Distractibility, Forgetful, and Restlessness/fidgety  Conduct Disorder: No symptoms  Autism Spectrum Disorder: No symptoms  Obsessive Compulsive Disorder: Checking.     Diagnostic Criteria:   Generalized Anxiety Disorder  A. Excessive anxiety and worry about a number of events or activities (such as work or school performance).   B. The person finds it difficult to control the worry.   - Restlessness or feeling keyed up or on edge.    - Being easily fatigued.    - Irritability.    - Muscle tension.    - Sleep disturbance (difficulty falling or staying asleep, or restless unsatisfying sleep).   D. The focus of the anxiety and worry is not confined to features of an Axis I disorder.  E. The anxiety, worry, or physical symptoms cause clinically significant distress or impairment in social, occupational, or other important areas of functioning.   F. The disturbance is not due to the direct physiological effects of a substance (e.g., a drug of abuse, a medication) or a  general medical condition (e.g., hyperthyroidism) and does not occur exclusively during a Mood Disorder, a Psychotic Disorder, or a Pervasive Developmental Disorder.    - The aforementioned symptoms began 1 year(s) ago and occurs 7 days per week and is experienced as mild.     Major Depressive Disorder, mild   - Depressed mood. Note: In children and adolescents, can be irritable mood.     - Diminished interest or pleasure in all, or almost all, activities.    - Increased sleep.    - Psychomotor activity varies.    - Fatigue or loss of energy.    - Feelings of worthlessness or excessive guilt.    - Diminished ability to think or concentrate, or indecisiveness.   B) The symptoms cause clinically significant distress or impairment in social, occupational, or other important areas of functioning  C) The episode is not attributable to the physiological effects of a substance or to another medical condition  D) The occurrence of major depressive episode is not better explained by other thought / psychotic disorders  E) There has never been a manic episode or hypomanic episode     Adjustment Disorder  A. The development of emotional or behavioral symptoms in response to an identifiable stressor(s) occurring within 3 months of the onset of the stressor(s)  B. These symptoms or behaviors are clinically significant, as evidenced by one or both of the following:       - Marked distress that is out of proportion to the severity/intensity of the stressor (with consideration for external context & culture)       - Significant impairment in social, occupational, or other important areas of functioning  C. The stress-related disturbance does not meet criteria for another disorder & is not an exacerbation of another mental disorder  D. The symptoms do not represent normal bereavement  E. Once the stressor or its consequences have terminated, the symptoms do not persist for more than an additional 6 months       * Adjustment Disorder  with Mixed Anxiety and Depressed Mood: The predominant manifestation is a combination of depression and anxiety    Functional Status: Patient reports the following functional impairments:  chronic disease management, health maintenance, management of the household and or completion of tasks, organization, self-care, social interactions, and work / vocational responsibilities.  Nonprogrammatic care:  Patient is requesting basic services to address current mental health concerns.    Clinical Summary:  1. 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 Noted perceived stigma with mental health. Has some support from sister. Lives alone. Substantial work related stress with changes, plans to retire in 2026.   2. Principal DSM5 Diagnoses  (Sustained by DSM5 Criteria Listed Above):   Adjustment Disorders  309.28 (F43.23) With mixed anxiety and depressed mood.  3. Other Diagnoses that is relevant to services:   296.22 (F32.1)  Major Depressive Disorder, Single Episode, Moderate With anxious distress.  4. Provisional Diagnosis:  300.02 (F41.1) Generalized Anxiety Disorder   5. Prognosis: Expect Improvement.  6. Likely consequences of symptoms if not treated: decompensation with functional capacities, increased level of care, breakdown of supportive relationships and/or employment loss, higher level with cost of care, loss of supportive relationships.  7. Client strengths include:  committed to sobriety, educated, empathetic, employed, goal-focused, good listener, insightful, intelligent, motivated, open to learning, open to suggestions / feedback, support of family, friends and providers, willing to ask questions, willing to relate to others, and work history .     Recommendations:     1. Plan for Safety and Risk Management:   Safety and Risk: Recommended that patient call 911 or go to  the local ED should there be a change in any of these risk factors.          Report to child / adult protection services was NA.     2. Patient's identified emotional distress and general mental health symptoms will be addressed through treatment planning.     3. Initial Treatment will focus on:    Depressed Mood   Anxiety   Adjustment Difficulties related to: family concerns and job related changes  Grief / Loss      4. Resources/Service Plan:    services are not indicated.   Modifications to assist communication are not indicated.   Additional disability accommodations are not indicated.      5. Collaboration:   Collaboration / coordination of treatment will be initiated with the following  support professionals: primary care physician.      6.  Referrals:   The following referral(s) will be initiated:  none at this time .       A Release of Information has been obtained for the following:  none at this time .     Clinical Substantiation/medical necessity for the above recommendations: to provide support and best level of care that is intended to improve symptoms long-term.      7. ALCIRA:    ALCIRA: Patient has managed needs in this area for several years. Recommendations: monitor for changes and address issues as identified.     8. Records:   These were reviewed at time of assessment.   Information in this assessment was obtained from the medical record and  provided by patient who is a good historian.    Patient will have open access to their mental health medical record.    9.   Interactive Complexity: No    10. Safety Plan: Contact or go to local ER, 911 if mental health crisis occurs. No specific Raúl-Brown Plan recommended at this time.     Provider Name/ Credentials:  ANN Abdalla  11/25/2024

## 2024-11-30 ENCOUNTER — HEALTH MAINTENANCE LETTER (OUTPATIENT)
Age: 64
End: 2024-11-30

## 2024-12-02 ENCOUNTER — OFFICE VISIT (OUTPATIENT)
Dept: PSYCHOLOGY | Facility: OTHER | Age: 64
End: 2024-12-02
Attending: SOCIAL WORKER
Payer: COMMERCIAL

## 2024-12-02 DIAGNOSIS — F33.0 MAJOR DEPRESSIVE DISORDER, RECURRENT EPISODE, MILD (H): ICD-10-CM

## 2024-12-02 DIAGNOSIS — F41.1 GAD (GENERALIZED ANXIETY DISORDER): Primary | ICD-10-CM

## 2024-12-02 PROCEDURE — 90837 PSYTX W PT 60 MINUTES: CPT | Performed by: SOCIAL WORKER

## 2024-12-02 NOTE — PROGRESS NOTES
[facility]                                     Progress Note    Patient Name: Adin Madrid  Date: 12/2/2024           Service Type: Individual      Session Start Time: 1245  Session End Time: 1340     Session Length: 55    Session #: 4    Attendees: Client attended alone    Service Modality:  In-person    DATA  Interactive Complexity: No  Crisis: No        Progress Since Last Session (Related to Symptoms / Goals / Homework):   Symptoms: No change parent's will encounter financial issues, prefers not to move parents. Sister has paperwork, Adin is executor. Back surgery coming up, going to consult for. Cholo's words with putting a [employer]. To assist. April time frame. Emails for programs are stressful. [employer]. Mother and father. Health matters; back, mental health, cardio. Parent's need 12 hours a day. Adin delegated to Nancy. Nancy will complete paper work for parent's.     Homework: Completed in session      Episode of Care Goals: Satisfactory progress - ACTION (Actively working towards change); Intervened by reinforcing change plan / affirming steps taken     Current / Ongoing Stressors and Concerns:   Personal health, parents, work matters.     Treatment Objective(s) Addressed in This Session:   participate in 1 activities to improve mood  Increase interest, engagement, and pleasure in doing things  Ideas to reduce personal stress, actions to take, consideration of priorities. +     Intervention:   CBT: with behaviors to change, asking for help, having limits. Keeping limits. Thoughts about what is in his best interest.     Assessments completed prior to visit:  The following assessments were completed by patient for this visit:  PHQ9:       4/13/2023    11:00 AM 9/19/2023     2:51 PM 7/18/2024     8:52 AM 7/24/2024     3:28 PM 8/26/2024    10:50 AM 9/2/2024     5:56 PM 11/5/2024     1:08 PM   PHQ-9 SCORE   PHQ-9 Total Score MyChart 9 (Mild depression) 7 (Mild depression) 7 (Mild  depression) 6 (Mild depression) 5 (Mild depression) 5 (Mild depression) 7 (Mild depression)   PHQ-9 Total Score 9 7 7 6 5 5 7        Patient-reported     GAD7:       3/1/2022     2:54 PM 3/27/2023     3:45 PM 9/13/2023    12:06 PM 7/15/2024    12:58 PM 7/18/2024     8:50 AM 9/2/2024     5:57 PM 11/5/2024     1:12 PM   MODESTA-7 SCORE   Total Score 8 (mild anxiety) 7 (mild anxiety) 7 (mild anxiety) 15 (severe anxiety) 16 (severe anxiety) 12 (moderate anxiety) 9 (mild anxiety)   Total Score 8 7 7 15 16 12 9        Patient-reported     PROMIS 10-Global Health (only subscores and total score):       7/24/2024     4:00 PM 11/5/2024     1:15 PM   PROMIS-10 Scores Only   Global Mental Health Score 8 9    Global Physical Health Score 12 11    PROMIS TOTAL - SUBSCORES 20 20        Patient-reported         ASSESSMENT: Current Emotional / Mental Status (status of significant symptoms):   Risk status (Self / Other harm or suicidal ideation)   Patient denies current fears or concerns for personal safety.   Patient denies current or recent suicidal ideation or behaviors.   Patient denies current or recent homicidal ideation or behaviors.   Patient denies current or recent self injurious behavior or ideation.   Patient denies other safety concerns.   Patient reports there has been no change in risk factors since their last session.     Patient reports there has been no change in protective factors since their last session.     Recommended that patient call 911 or go to the local ED should there be a change in any of these risk factors     Appearance:   Appropriate    Eye Contact:   Good    Psychomotor Behavior: Normal    Attitude:   Cooperative    Orientation:   All   Speech    Rate / Production: Normal/ Responsive Normal     Volume:  Normal    Mood:    Anxious  Normal   Affect:    Appropriate    Thought Content:  Clear    Thought Form:  Coherent  Logical    Insight:    Good      Medication Review:   No changes to current psychiatric  medication(s)     Medication Compliance:   Yes     Changes in Health Issues:   Yes: upcoming back surgery and consult, Associated Psychological Distress       Chemical Use Review:   Substance Use: Chemical use reviewed, no active concerns identified      Tobacco Use: No current tobacco use.      Diagnosis:      Collateral Reports Completed:   Not Applicable    PLAN: (Patient Tasks: continue work with identifying area in life that can change, be less stressful and feeling better.  Therapist Tasks / Other)  - Create treatment plan around depression, anxiety and stress reduction needs.         Victor Manuel Villarreal, [facility]   12/2/2024                                                           ______________________________________________________________________

## 2025-03-09 ENCOUNTER — HEALTH MAINTENANCE LETTER (OUTPATIENT)
Age: 65
End: 2025-03-09

## 2025-06-18 ENCOUNTER — PATIENT OUTREACH (OUTPATIENT)
Dept: CARE COORDINATION | Facility: CLINIC | Age: 65
End: 2025-06-18
Payer: COMMERCIAL

## 2025-06-19 ENCOUNTER — TRANSFERRED RECORDS (OUTPATIENT)
Dept: MULTI SPECIALTY CLINIC | Facility: CLINIC | Age: 65
End: 2025-06-19

## 2025-06-19 LAB — HBA1C MFR BLD: 6.1 % (ref 4–5.6)

## 2025-06-28 ENCOUNTER — HEALTH MAINTENANCE LETTER (OUTPATIENT)
Age: 65
End: 2025-06-28

## 2025-08-29 ENCOUNTER — TELEPHONE (OUTPATIENT)
Dept: PEDIATRICS | Facility: OTHER | Age: 65
End: 2025-08-29
Payer: COMMERCIAL

## (undated) RX ORDER — SODIUM CHLORIDE FOR INHALATION 0.9 %
VIAL, NEBULIZER (ML) INHALATION
Status: DISPENSED
Start: 2019-07-15

## (undated) RX ORDER — TETRACAINE HYDROCHLORIDE 5 MG/ML
SOLUTION OPHTHALMIC
Status: DISPENSED
Start: 2021-04-08

## (undated) RX ORDER — ASPIRIN 81 MG/1
TABLET, CHEWABLE ORAL
Status: DISPENSED
Start: 2023-04-03

## (undated) RX ORDER — HEPARIN SODIUM 5000 [USP'U]/.5ML
INJECTION, SOLUTION INTRAVENOUS; SUBCUTANEOUS
Status: DISPENSED
Start: 2023-04-03